# Patient Record
Sex: FEMALE | Race: WHITE | NOT HISPANIC OR LATINO | Employment: OTHER | URBAN - METROPOLITAN AREA
[De-identification: names, ages, dates, MRNs, and addresses within clinical notes are randomized per-mention and may not be internally consistent; named-entity substitution may affect disease eponyms.]

---

## 2017-08-02 ENCOUNTER — LAB SERVICES (OUTPATIENT)
Dept: LAB | Age: 57
End: 2017-08-02

## 2017-08-02 ENCOUNTER — OFFICE VISIT (OUTPATIENT)
Dept: FAMILY MEDICINE | Age: 57
End: 2017-08-02

## 2017-08-02 VITALS
DIASTOLIC BLOOD PRESSURE: 60 MMHG | HEIGHT: 67 IN | SYSTOLIC BLOOD PRESSURE: 112 MMHG | WEIGHT: 177 LBS | HEART RATE: 80 BPM | BODY MASS INDEX: 27.78 KG/M2

## 2017-08-02 DIAGNOSIS — N95.0 POST-MENOPAUSAL BLEEDING: ICD-10-CM

## 2017-08-02 DIAGNOSIS — N83.8 OVARIAN MASS: ICD-10-CM

## 2017-08-02 DIAGNOSIS — Z01.419 ENCOUNTER FOR GYNECOLOGICAL EXAMINATION WITHOUT ABNORMAL FINDING: ICD-10-CM

## 2017-08-02 DIAGNOSIS — Z13.220 SCREENING FOR LIPOID DISORDERS: ICD-10-CM

## 2017-08-02 DIAGNOSIS — Z12.4 PAP SMEAR FOR CERVICAL CANCER SCREENING: ICD-10-CM

## 2017-08-02 DIAGNOSIS — Z00.00 ANNUAL PHYSICAL EXAM: Primary | ICD-10-CM

## 2017-08-02 DIAGNOSIS — Z12.39 BREAST CANCER SCREENING: ICD-10-CM

## 2017-08-02 DIAGNOSIS — R22.32 LOCALIZED SWELLING, MASS, OR LUMP OF UPPER EXTREMITY, LEFT: ICD-10-CM

## 2017-08-02 PROCEDURE — 36415 COLL VENOUS BLD VENIPUNCTURE: CPT | Performed by: INTERNAL MEDICINE

## 2017-08-02 PROCEDURE — 87624 HPV HI-RISK TYP POOLED RSLT: CPT | Performed by: INTERNAL MEDICINE

## 2017-08-02 PROCEDURE — 80061 LIPID PANEL: CPT | Performed by: INTERNAL MEDICINE

## 2017-08-02 PROCEDURE — 88175 CYTOPATH C/V AUTO FLUID REDO: CPT | Performed by: INTERNAL MEDICINE

## 2017-08-02 PROCEDURE — 99396 PREV VISIT EST AGE 40-64: CPT | Performed by: FAMILY MEDICINE

## 2017-08-02 PROCEDURE — 84443 ASSAY THYROID STIM HORMONE: CPT | Performed by: INTERNAL MEDICINE

## 2017-08-03 ENCOUNTER — TELEPHONE (OUTPATIENT)
Dept: FAMILY MEDICINE | Age: 57
End: 2017-08-03

## 2017-08-03 LAB
CHOLEST SERPL-MCNC: 223 MG/DL
CHOLEST/HDLC SERPL: 3.7 {RATIO}
HDLC SERPL-MCNC: 61 MG/DL
LDLC SERPL-MCNC: 141 MG/DL
LENGTH OF FAST TIME PATIENT: 16 HRS
NONHDLC SERPL-MCNC: 162 MG/DL
TRIGL SERPL-MCNC: 105 MG/DL
TSH SERPL-ACNC: 0.88 MCUNITS/ML (ref 0.35–5)

## 2017-08-08 LAB — HPV16+18+45 E6+E7MRNA CVX NAA+PROBE: NORMAL

## 2017-08-09 ENCOUNTER — TELEPHONE (OUTPATIENT)
Dept: FAMILY MEDICINE | Age: 57
End: 2017-08-09

## 2017-08-09 DIAGNOSIS — R22.9 LOCALIZED SUPERFICIAL SWELLING, MASS, OR LUMP: Primary | ICD-10-CM

## 2017-08-18 ENCOUNTER — TELEPHONE (OUTPATIENT)
Dept: FAMILY MEDICINE | Age: 57
End: 2017-08-18

## 2017-08-18 ENCOUNTER — IMAGING SERVICES (OUTPATIENT)
Dept: GENERAL RADIOLOGY | Age: 57
End: 2017-08-18
Attending: FAMILY MEDICINE

## 2017-08-18 DIAGNOSIS — Z12.39 BREAST CANCER SCREENING: ICD-10-CM

## 2017-08-18 DIAGNOSIS — R22.32 LOCALIZED SWELLING, MASS, OR LUMP OF UPPER EXTREMITY, LEFT: ICD-10-CM

## 2017-08-18 DIAGNOSIS — N83.8 OVARIAN MASS: ICD-10-CM

## 2017-08-18 DIAGNOSIS — D17.9 ATYPICAL LIPOMA OF SOFT TISSUE: Primary | ICD-10-CM

## 2017-08-18 DIAGNOSIS — Z01.812 PRE-PROCEDURE LAB EXAM: Primary | ICD-10-CM

## 2017-08-18 DIAGNOSIS — R22.9 LOCALIZED SUPERFICIAL SWELLING, MASS, OR LUMP: ICD-10-CM

## 2017-08-18 PROCEDURE — G0202 SCR MAMMO BI INCL CAD: HCPCS | Performed by: RADIOLOGY

## 2017-08-18 PROCEDURE — 76882 US LMTD JT/FCL EVL NVASC XTR: CPT | Performed by: RADIOLOGY

## 2017-08-18 PROCEDURE — 77063 BREAST TOMOSYNTHESIS BI: CPT | Performed by: RADIOLOGY

## 2017-08-18 PROCEDURE — 76856 US EXAM PELVIC COMPLETE: CPT | Performed by: RADIOLOGY

## 2017-08-18 PROCEDURE — 76830 TRANSVAGINAL US NON-OB: CPT | Performed by: RADIOLOGY

## 2017-08-21 ENCOUNTER — LAB SERVICES (OUTPATIENT)
Dept: LAB | Age: 57
End: 2017-08-21

## 2017-08-21 DIAGNOSIS — Z01.812 PRE-PROCEDURE LAB EXAM: ICD-10-CM

## 2017-08-21 LAB — CREAT SERPL-MCNC: 0.91 MG/DL (ref 0.51–0.95)

## 2017-08-21 PROCEDURE — 36415 COLL VENOUS BLD VENIPUNCTURE: CPT | Performed by: INTERNAL MEDICINE

## 2017-08-21 PROCEDURE — 82565 ASSAY OF CREATININE: CPT | Performed by: INTERNAL MEDICINE

## 2017-08-29 ENCOUNTER — IMAGING SERVICES (OUTPATIENT)
Dept: GENERAL RADIOLOGY | Age: 57
End: 2017-08-29
Attending: FAMILY MEDICINE

## 2017-08-29 DIAGNOSIS — R22.9 LOCALIZED SUPERFICIAL SWELLING, MASS, OR LUMP: ICD-10-CM

## 2017-08-29 PROCEDURE — A9585 GADOBUTROL INJECTION: HCPCS | Performed by: RADIOLOGY

## 2017-08-29 PROCEDURE — 73220 MRI UPPR EXTREMITY W/O&W/DYE: CPT | Performed by: RADIOLOGY

## 2017-08-29 RX ORDER — GADOBUTROL 604.72 MG/ML
8 INJECTION INTRAVENOUS ONCE
Status: COMPLETED | OUTPATIENT
Start: 2017-08-29 | End: 2017-08-29

## 2017-08-29 RX ADMIN — GADOBUTROL 8 ML: 604.72 INJECTION INTRAVENOUS at 09:10

## 2017-08-31 ENCOUNTER — TELEPHONE (OUTPATIENT)
Dept: FAMILY MEDICINE | Age: 57
End: 2017-08-31

## 2017-08-31 PROBLEM — D17.9 ATYPICAL LIPOMA OF SOFT TISSUE: Status: ACTIVE | Noted: 2017-08-31

## 2017-09-14 ENCOUNTER — OFFICE VISIT (OUTPATIENT)
Dept: ORTHOPEDICS | Age: 57
End: 2017-09-14

## 2017-09-14 VITALS — TEMPERATURE: 98.3 F | WEIGHT: 185 LBS | HEIGHT: 66 IN | BODY MASS INDEX: 29.73 KG/M2

## 2017-09-14 DIAGNOSIS — M79.89 SOFT TISSUE MASS: Primary | ICD-10-CM

## 2017-09-14 PROCEDURE — 99243 OFF/OP CNSLTJ NEW/EST LOW 30: CPT | Performed by: NURSE PRACTITIONER

## 2017-09-19 ENCOUNTER — PREP FOR CASE (OUTPATIENT)
Dept: ORTHOPEDICS | Age: 57
End: 2017-09-19

## 2017-09-19 DIAGNOSIS — M25.812 MASS OF JOINT OF LEFT SHOULDER: Primary | ICD-10-CM

## 2017-09-27 RX ORDER — 0.9 % SODIUM CHLORIDE 0.9 %
2 VIAL (ML) INJECTION EVERY 12 HOURS SCHEDULED
Status: CANCELLED | OUTPATIENT
Start: 2017-09-27

## 2017-09-27 RX ORDER — 0.9 % SODIUM CHLORIDE 0.9 %
2 VIAL (ML) INJECTION PRN
Status: CANCELLED | OUTPATIENT
Start: 2017-09-27

## 2017-10-13 ENCOUNTER — NURSE ONLY (OUTPATIENT)
Dept: FAMILY MEDICINE | Age: 57
End: 2017-10-13

## 2017-10-13 DIAGNOSIS — Z23 NEED FOR VACCINATION: Primary | ICD-10-CM

## 2017-10-13 PROCEDURE — 90688 IIV4 VACCINE SPLT 0.5 ML IM: CPT | Performed by: FAMILY MEDICINE

## 2017-10-13 PROCEDURE — 90471 IMMUNIZATION ADMIN: CPT | Performed by: FAMILY MEDICINE

## 2017-11-08 ENCOUNTER — TELEPHONE (OUTPATIENT)
Dept: FAMILY MEDICINE | Age: 57
End: 2017-11-08

## 2017-11-09 ENCOUNTER — OFFICE VISIT (OUTPATIENT)
Dept: FAMILY MEDICINE | Age: 57
End: 2017-11-09

## 2017-11-09 VITALS
WEIGHT: 187 LBS | HEART RATE: 80 BPM | SYSTOLIC BLOOD PRESSURE: 140 MMHG | DIASTOLIC BLOOD PRESSURE: 80 MMHG | BODY MASS INDEX: 30.18 KG/M2

## 2017-11-09 DIAGNOSIS — F32.9 REACTIVE DEPRESSION: Primary | ICD-10-CM

## 2017-11-09 DIAGNOSIS — M76.899 ENTHESOPATHY OF KNEE: ICD-10-CM

## 2017-11-09 PROCEDURE — 99214 OFFICE O/P EST MOD 30 MIN: CPT | Performed by: FAMILY MEDICINE

## 2017-11-09 RX ORDER — MELOXICAM 15 MG/1
15 TABLET ORAL DAILY
Qty: 30 TABLET | Refills: 1 | Status: SHIPPED | OUTPATIENT
Start: 2017-11-09

## 2017-11-09 RX ORDER — ESCITALOPRAM OXALATE 10 MG/1
10 TABLET ORAL DAILY
Qty: 30 TABLET | Refills: 1 | Status: SHIPPED | OUTPATIENT
Start: 2017-11-09

## 2017-12-06 ENCOUNTER — OFFICE VISIT (OUTPATIENT)
Dept: FAMILY MEDICINE | Age: 57
End: 2017-12-06

## 2017-12-06 ENCOUNTER — IMAGING SERVICES (OUTPATIENT)
Dept: GENERAL RADIOLOGY | Age: 57
End: 2017-12-06

## 2017-12-06 ENCOUNTER — TELEPHONE (OUTPATIENT)
Dept: FAMILY MEDICINE | Age: 57
End: 2017-12-06

## 2017-12-06 ENCOUNTER — LAB SERVICES (OUTPATIENT)
Dept: LAB | Age: 57
End: 2017-12-06

## 2017-12-06 ENCOUNTER — OFFICE VISIT (OUTPATIENT)
Dept: CARDIOLOGY | Age: 57
End: 2017-12-06
Attending: FAMILY MEDICINE

## 2017-12-06 VITALS
DIASTOLIC BLOOD PRESSURE: 74 MMHG | HEART RATE: 72 BPM | OXYGEN SATURATION: 95 % | BODY MASS INDEX: 31.02 KG/M2 | SYSTOLIC BLOOD PRESSURE: 116 MMHG | WEIGHT: 192.2 LBS

## 2017-12-06 DIAGNOSIS — Z01.818 PREOP EXAMINATION: ICD-10-CM

## 2017-12-06 DIAGNOSIS — Z11.59 NEED FOR HEPATITIS C SCREENING TEST: ICD-10-CM

## 2017-12-06 DIAGNOSIS — Z01.818 PREOP EXAMINATION: Primary | ICD-10-CM

## 2017-12-06 DIAGNOSIS — D17.9 ATYPICAL LIPOMA OF SOFT TISSUE: ICD-10-CM

## 2017-12-06 LAB
ANION GAP SERPL CALC-SCNC: 17 MMOL/L (ref 10–20)
BASOPHILS # BLD AUTO: 0 K/MCL (ref 0–0.3)
BASOPHILS NFR BLD AUTO: 1 %
BUN SERPL-MCNC: 27 MG/DL (ref 6–20)
BUN/CREAT SERPL: 30 (ref 7–25)
CALCIUM SERPL-MCNC: 9.1 MG/DL (ref 8.4–10.2)
CHLORIDE SERPL-SCNC: 100 MMOL/L (ref 98–107)
CO2 SERPL-SCNC: 27 MMOL/L (ref 21–32)
CREAT SERPL-MCNC: 0.9 MG/DL (ref 0.51–0.95)
DIFFERENTIAL METHOD BLD: NORMAL
EOSINOPHIL # BLD AUTO: 0.1 K/MCL (ref 0.1–0.5)
EOSINOPHIL NFR SPEC: 1 %
ERYTHROCYTE [DISTWIDTH] IN BLOOD: 12.4 % (ref 11–15)
FASTING STATUS PATIENT QL REPORTED: 2 HRS
GLUCOSE SERPL-MCNC: 83 MG/DL (ref 65–99)
HCT VFR BLD CALC: 39.3 % (ref 36–46.5)
HGB BLD-MCNC: 13.6 G/DL (ref 12–15.5)
LYMPHOCYTES # BLD MANUAL: 2 K/MCL (ref 1–4)
LYMPHOCYTES NFR BLD MANUAL: 32 %
MCH RBC QN AUTO: 30 PG (ref 26–34)
MCHC RBC AUTO-ENTMCNC: 34.6 G/DL (ref 32–36.5)
MCV RBC AUTO: 86.6 FL (ref 78–100)
MONOCYTES # BLD MANUAL: 0.7 K/MCL (ref 0.3–0.9)
MONOCYTES NFR BLD MANUAL: 10 %
NEUTROPHILS # BLD: 3.6 K/MCL (ref 1.8–7.7)
NEUTROPHILS NFR BLD AUTO: 56 %
PLATELET # BLD: 267 K/MCL (ref 140–450)
POTASSIUM SERPL-SCNC: 4.4 MMOL/L (ref 3.4–5.1)
RBC # BLD: 4.54 MIL/MCL (ref 4–5.2)
SODIUM SERPL-SCNC: 140 MMOL/L (ref 135–145)
WBC # BLD: 6.3 K/MCL (ref 4.2–11)

## 2017-12-06 PROCEDURE — 93000 ELECTROCARDIOGRAM COMPLETE: CPT | Performed by: INTERNAL MEDICINE

## 2017-12-06 PROCEDURE — 85025 COMPLETE CBC W/AUTO DIFF WBC: CPT | Performed by: INTERNAL MEDICINE

## 2017-12-06 PROCEDURE — 99244 OFF/OP CNSLTJ NEW/EST MOD 40: CPT | Performed by: FAMILY MEDICINE

## 2017-12-06 PROCEDURE — 71020 XR CHEST PA AND LATERAL: CPT | Performed by: RADIOLOGY

## 2017-12-06 PROCEDURE — 86803 HEPATITIS C AB TEST: CPT | Performed by: INTERNAL MEDICINE

## 2017-12-06 PROCEDURE — 36415 COLL VENOUS BLD VENIPUNCTURE: CPT | Performed by: INTERNAL MEDICINE

## 2017-12-06 PROCEDURE — 80048 BASIC METABOLIC PNL TOTAL CA: CPT | Performed by: INTERNAL MEDICINE

## 2017-12-07 ENCOUNTER — TELEPHONE (OUTPATIENT)
Dept: FAMILY MEDICINE | Age: 57
End: 2017-12-07

## 2017-12-07 LAB — HCV AB SER QL: NEGATIVE

## 2017-12-18 ENCOUNTER — ANESTHESIA EVENT (OUTPATIENT)
Dept: SURGERY | Age: 57
End: 2017-12-18

## 2017-12-18 ASSESSMENT — ACTIVITIES OF DAILY LIVING (ADL)
HISTORY OF FALLING IN THE LAST YEAR (PRIOR TO ADMISSION): NO
ADL_SHORT_OF_BREATH: NO
NEEDS_ASSIST: NO
RECENT_DECLINE_ADL: NO
ADL_SCORE: 12
CHRONIC_PAIN_PRESENT: NO;YES, CHRONIC
ADL_BEFORE_ADMISSION: INDEPENDENT
DESCRIBE HOW PAIN IMPACTS YOUR LIFE: PHYSICAL ACTIVITY
SENSORY_SUPPORT_DEVICES: EYEGLASSES

## 2017-12-19 ENCOUNTER — HOSPITAL ENCOUNTER (OUTPATIENT)
Age: 57
Discharge: HOME | End: 2017-12-19
Attending: ORTHOPAEDIC SURGERY | Admitting: ORTHOPAEDIC SURGERY

## 2017-12-19 ENCOUNTER — SURGERY (OUTPATIENT)
Age: 57
End: 2017-12-19

## 2017-12-19 ENCOUNTER — ANESTHESIA (OUTPATIENT)
Dept: SURGERY | Age: 57
End: 2017-12-19

## 2017-12-19 DIAGNOSIS — M79.89 SOFT TISSUE MASS: ICD-10-CM

## 2017-12-19 PROCEDURE — 10003056 HB DISPOSABLE INSTRUMENT/SUPPLY 1: Performed by: ORTHOPAEDIC SURGERY

## 2017-12-19 PROCEDURE — 10002800 HB RX 250 W HCPCS: Performed by: NURSE PRACTITIONER

## 2017-12-19 PROCEDURE — 10002800 HB RX 250 W HCPCS: Performed by: ANESTHESIOLOGIST ASSISTANT

## 2017-12-19 PROCEDURE — 10002359 HB ANESTH GENERAL ADD'L 1/2 HR: Performed by: ORTHOPAEDIC SURGERY

## 2017-12-19 PROCEDURE — 10004451 HB PACU RECOVERY 1ST 30 MINUTES: Performed by: ORTHOPAEDIC SURGERY

## 2017-12-19 PROCEDURE — 10002801 HB RX 250 W/O HCPCS: Performed by: ANESTHESIOLOGIST ASSISTANT

## 2017-12-19 PROCEDURE — 23073 EXC SHOULDER TUM DEEP 5 CM/>: CPT | Performed by: ORTHOPAEDIC SURGERY

## 2017-12-19 PROCEDURE — 10004452 HB PACU ADDL 30 MINUTES: Performed by: ORTHOPAEDIC SURGERY

## 2017-12-19 PROCEDURE — 10002807 HB RX 258: Performed by: ANESTHESIOLOGIST ASSISTANT

## 2017-12-19 PROCEDURE — 10002803 HB RX 637: Performed by: ANESTHESIOLOGY

## 2017-12-19 PROCEDURE — 88304 TISSUE EXAM BY PATHOLOGIST: CPT

## 2017-12-19 PROCEDURE — 10002767 HB EXTENDED RECOVERY PER HOUR: Performed by: ORTHOPAEDIC SURGERY

## 2017-12-19 PROCEDURE — 10002801 HB RX 250 W/O HCPCS: Performed by: ORTHOPAEDIC SURGERY

## 2017-12-19 PROCEDURE — 10004316 HB COUNTER-PREP

## 2017-12-19 PROCEDURE — 10002358 HB ANESTH GENERAL 1ST 1/2 HR: Performed by: ORTHOPAEDIC SURGERY

## 2017-12-19 PROCEDURE — 10002117 HB ADDITIONAL SURGERY TIME/30 MIN: Performed by: ORTHOPAEDIC SURGERY

## 2017-12-19 PROCEDURE — 10002807 HB RX 258: Performed by: ANESTHESIOLOGY

## 2017-12-19 PROCEDURE — 10002344 HB UPPER/LOWER ARM/WRIST/HAND 1: Performed by: ORTHOPAEDIC SURGERY

## 2017-12-19 RX ORDER — PROPOFOL 10 MG/ML
INJECTION, EMULSION INTRAVENOUS PRN
Status: DISCONTINUED | OUTPATIENT
Start: 2017-12-19 | End: 2017-12-19

## 2017-12-19 RX ORDER — ONDANSETRON 4 MG/1
4 TABLET, ORALLY DISINTEGRATING ORAL
Status: COMPLETED | OUTPATIENT
Start: 2017-12-19 | End: 2017-12-19

## 2017-12-19 RX ORDER — LIDOCAINE AND PRILOCAINE 25; 25 MG/G; MG/G
1 CREAM TOPICAL PRN
Status: DISCONTINUED | OUTPATIENT
Start: 2017-12-19 | End: 2017-12-19 | Stop reason: HOSPADM

## 2017-12-19 RX ORDER — LIDOCAINE HYDROCHLORIDE 10 MG/ML
5-10 INJECTION, SOLUTION INFILTRATION; PERINEURAL PRN
Status: DISCONTINUED | OUTPATIENT
Start: 2017-12-19 | End: 2017-12-19 | Stop reason: HOSPADM

## 2017-12-19 RX ORDER — DEXAMETHASONE SODIUM PHOSPHATE 4 MG/ML
INJECTION, SOLUTION INTRA-ARTICULAR; INTRALESIONAL; INTRAMUSCULAR; INTRAVENOUS; SOFT TISSUE PRN
Status: DISCONTINUED | OUTPATIENT
Start: 2017-12-19 | End: 2017-12-19

## 2017-12-19 RX ORDER — SODIUM CHLORIDE, SODIUM LACTATE, POTASSIUM CHLORIDE, CALCIUM CHLORIDE 600; 310; 30; 20 MG/100ML; MG/100ML; MG/100ML; MG/100ML
INJECTION, SOLUTION INTRAVENOUS CONTINUOUS PRN
Status: DISCONTINUED | OUTPATIENT
Start: 2017-12-19 | End: 2017-12-19

## 2017-12-19 RX ORDER — HYDRALAZINE HYDROCHLORIDE 20 MG/ML
5 INJECTION INTRAMUSCULAR; INTRAVENOUS EVERY 10 MIN PRN
Status: DISCONTINUED | OUTPATIENT
Start: 2017-12-19 | End: 2017-12-19 | Stop reason: HOSPADM

## 2017-12-19 RX ORDER — DEXTROSE MONOHYDRATE 25 G/50ML
25 INJECTION, SOLUTION INTRAVENOUS PRN
Status: DISCONTINUED | OUTPATIENT
Start: 2017-12-19 | End: 2017-12-19 | Stop reason: HOSPADM

## 2017-12-19 RX ORDER — HUMAN INSULIN 100 [IU]/ML
INJECTION, SOLUTION SUBCUTANEOUS
Status: DISCONTINUED | OUTPATIENT
Start: 2017-12-19 | End: 2017-12-19 | Stop reason: HOSPADM

## 2017-12-19 RX ORDER — LIDOCAINE HYDROCHLORIDE 20 MG/ML
INJECTION, SOLUTION INFILTRATION; PERINEURAL PRN
Status: DISCONTINUED | OUTPATIENT
Start: 2017-12-19 | End: 2017-12-19

## 2017-12-19 RX ORDER — 0.9 % SODIUM CHLORIDE 0.9 %
2 VIAL (ML) INJECTION EVERY 12 HOURS SCHEDULED
Status: DISCONTINUED | OUTPATIENT
Start: 2017-12-19 | End: 2017-12-19 | Stop reason: HOSPADM

## 2017-12-19 RX ORDER — CEFAZOLIN SODIUM/WATER 2 G/20 ML
2000 SYRINGE (ML) INTRAVENOUS
Status: COMPLETED | OUTPATIENT
Start: 2017-12-19 | End: 2017-12-19

## 2017-12-19 RX ORDER — ONDANSETRON 2 MG/ML
4 INJECTION INTRAMUSCULAR; INTRAVENOUS 2 TIMES DAILY PRN
Status: CANCELLED | OUTPATIENT
Start: 2017-12-19

## 2017-12-19 RX ORDER — DIPHENHYDRAMINE HYDROCHLORIDE 50 MG/ML
25 INJECTION INTRAMUSCULAR; INTRAVENOUS
Status: DISCONTINUED | OUTPATIENT
Start: 2017-12-19 | End: 2017-12-19 | Stop reason: HOSPADM

## 2017-12-19 RX ORDER — SODIUM CHLORIDE, SODIUM LACTATE, POTASSIUM CHLORIDE, CALCIUM CHLORIDE 600; 310; 30; 20 MG/100ML; MG/100ML; MG/100ML; MG/100ML
INJECTION, SOLUTION INTRAVENOUS CONTINUOUS
Status: DISCONTINUED | OUTPATIENT
Start: 2017-12-19 | End: 2017-12-19 | Stop reason: HOSPADM

## 2017-12-19 RX ORDER — SODIUM CHLORIDE, SODIUM LACTATE, POTASSIUM CHLORIDE, CALCIUM CHLORIDE 600; 310; 30; 20 MG/100ML; MG/100ML; MG/100ML; MG/100ML
INJECTION, SOLUTION INTRAVENOUS CONTINUOUS
Status: CANCELLED | OUTPATIENT
Start: 2017-12-19

## 2017-12-19 RX ORDER — LABETALOL HYDROCHLORIDE 5 MG/ML
5 INJECTION, SOLUTION INTRAVENOUS EVERY 10 MIN PRN
Status: DISCONTINUED | OUTPATIENT
Start: 2017-12-19 | End: 2017-12-19 | Stop reason: HOSPADM

## 2017-12-19 RX ORDER — MIDAZOLAM HYDROCHLORIDE 1 MG/ML
INJECTION, SOLUTION INTRAMUSCULAR; INTRAVENOUS PRN
Status: DISCONTINUED | OUTPATIENT
Start: 2017-12-19 | End: 2017-12-19

## 2017-12-19 RX ORDER — ACETAMINOPHEN 325 MG/1
650 TABLET ORAL EVERY 4 HOURS PRN
Status: CANCELLED | OUTPATIENT
Start: 2017-12-19

## 2017-12-19 RX ORDER — NICOTINE POLACRILEX 4 MG
30 LOZENGE BUCCAL
Status: DISCONTINUED | OUTPATIENT
Start: 2017-12-19 | End: 2017-12-19 | Stop reason: HOSPADM

## 2017-12-19 RX ORDER — BUPIVACAINE HYDROCHLORIDE AND EPINEPHRINE 2.5; 5 MG/ML; UG/ML
INJECTION, SOLUTION EPIDURAL; INFILTRATION; INTRACAUDAL; PERINEURAL PRN
Status: DISCONTINUED | OUTPATIENT
Start: 2017-12-19 | End: 2017-12-19 | Stop reason: HOSPADM

## 2017-12-19 RX ORDER — SODIUM CHLORIDE 9 MG/ML
INJECTION, SOLUTION INTRAVENOUS CONTINUOUS
Status: DISCONTINUED | OUTPATIENT
Start: 2017-12-19 | End: 2017-12-19 | Stop reason: HOSPADM

## 2017-12-19 RX ORDER — HYDROCODONE BITARTRATE AND ACETAMINOPHEN 5; 325 MG/1; MG/1
1-2 TABLET ORAL EVERY 4 HOURS PRN
Status: CANCELLED | OUTPATIENT
Start: 2017-12-19

## 2017-12-19 RX ORDER — 0.9 % SODIUM CHLORIDE 0.9 %
2 VIAL (ML) INJECTION PRN
Status: DISCONTINUED | OUTPATIENT
Start: 2017-12-19 | End: 2017-12-19 | Stop reason: HOSPADM

## 2017-12-19 RX ORDER — ALBUTEROL SULFATE 2.5 MG/3ML
2.5 SOLUTION RESPIRATORY (INHALATION)
Status: DISCONTINUED | OUTPATIENT
Start: 2017-12-19 | End: 2017-12-19 | Stop reason: HOSPADM

## 2017-12-19 RX ORDER — MIDAZOLAM HYDROCHLORIDE 1 MG/ML
2 INJECTION, SOLUTION INTRAMUSCULAR; INTRAVENOUS
Status: DISCONTINUED | OUTPATIENT
Start: 2017-12-19 | End: 2017-12-19 | Stop reason: HOSPADM

## 2017-12-19 RX ORDER — DIPHENHYDRAMINE HYDROCHLORIDE 50 MG/ML
12.5 INJECTION INTRAMUSCULAR; INTRAVENOUS EVERY 4 HOURS PRN
Status: DISCONTINUED | OUTPATIENT
Start: 2017-12-19 | End: 2017-12-19 | Stop reason: HOSPADM

## 2017-12-19 RX ORDER — PROCHLORPERAZINE MALEATE 5 MG/1
5 TABLET ORAL EVERY 4 HOURS PRN
Status: CANCELLED | OUTPATIENT
Start: 2017-12-19

## 2017-12-19 RX ORDER — ONDANSETRON 2 MG/ML
4 INJECTION INTRAMUSCULAR; INTRAVENOUS 2 TIMES DAILY PRN
Status: DISCONTINUED | OUTPATIENT
Start: 2017-12-19 | End: 2017-12-19 | Stop reason: HOSPADM

## 2017-12-19 RX ORDER — SODIUM CHLORIDE 9 MG/ML
INJECTION, SOLUTION INTRAVENOUS CONTINUOUS PRN
Status: DISCONTINUED | OUTPATIENT
Start: 2017-12-19 | End: 2017-12-19

## 2017-12-19 RX ADMIN — HYDROMORPHONE HYDROCHLORIDE 0.5 MG: 1 INJECTION, SOLUTION INTRAMUSCULAR; INTRAVENOUS; SUBCUTANEOUS at 07:20

## 2017-12-19 RX ADMIN — SODIUM CHLORIDE: 9 INJECTION, SOLUTION INTRAVENOUS at 07:34

## 2017-12-19 RX ADMIN — SODIUM CHLORIDE, POTASSIUM CHLORIDE, SODIUM LACTATE AND CALCIUM CHLORIDE: 600; 310; 30; 20 INJECTION, SOLUTION INTRAVENOUS at 07:03

## 2017-12-19 RX ADMIN — FENTANYL CITRATE 50 MCG: 50 INJECTION INTRAMUSCULAR; INTRAVENOUS at 07:06

## 2017-12-19 RX ADMIN — SODIUM CHLORIDE, POTASSIUM CHLORIDE, SODIUM LACTATE AND CALCIUM CHLORIDE: 600; 310; 30; 20 INJECTION, SOLUTION INTRAVENOUS at 07:34

## 2017-12-19 RX ADMIN — SODIUM CHLORIDE: 900 IRRIGANT IRRIGATION at 07:34

## 2017-12-19 RX ADMIN — MIDAZOLAM HYDROCHLORIDE 2 MG: 1 INJECTION, SOLUTION INTRAMUSCULAR; INTRAVENOUS at 07:01

## 2017-12-19 RX ADMIN — DEXAMETHASONE SODIUM PHOSPHATE 4 MG: 4 INJECTION, SOLUTION INTRAMUSCULAR; INTRAVENOUS at 07:15

## 2017-12-19 RX ADMIN — BUPIVACAINE HYDROCHLORIDE AND EPINEPHRINE BITARTRATE 15 ML: 2.5; .005 INJECTION, SOLUTION EPIDURAL; INFILTRATION; INTRACAUDAL; PERINEURAL at 07:34

## 2017-12-19 RX ADMIN — PROPOFOL 130 MG: 10 INJECTION, EMULSION INTRAVENOUS at 07:09

## 2017-12-19 RX ADMIN — ONDANSETRON 4 MG: 4 TABLET, ORALLY DISINTEGRATING ORAL at 06:33

## 2017-12-19 RX ADMIN — KETOROLAC TROMETHAMINE 30 MG: 15 INJECTION, SOLUTION INTRAMUSCULAR; INTRAVENOUS at 07:40

## 2017-12-19 RX ADMIN — LIDOCAINE HYDROCHLORIDE 100 MG: 20 INJECTION, SOLUTION INFILTRATION; PERINEURAL at 07:09

## 2017-12-19 RX ADMIN — Medication 2000 MG: at 07:11

## 2017-12-19 RX ADMIN — SODIUM CHLORIDE, POTASSIUM CHLORIDE, SODIUM LACTATE AND CALCIUM CHLORIDE: 600; 310; 30; 20 INJECTION, SOLUTION INTRAVENOUS at 06:33

## 2017-12-19 ASSESSMENT — ACTIVITIES OF DAILY LIVING (ADL): HISTORY OF FALLING IN THE LAST YEAR (PRIOR TO ADMISSION): NO

## 2017-12-19 ASSESSMENT — PAIN SCALES - GENERAL
PAIN_LEVEL_AT_REST: 0
PAIN_LEVEL_AT_REST: 0

## 2017-12-20 LAB — PATHOLOGIST NAME: NORMAL

## 2017-12-27 ENCOUNTER — TELEPHONE (OUTPATIENT)
Dept: ORTHOPEDICS | Age: 57
End: 2017-12-27

## 2018-08-02 ENCOUNTER — TELEPHONE (OUTPATIENT)
Dept: FAMILY MEDICINE | Age: 58
End: 2018-08-02

## 2018-08-29 ENCOUNTER — OFFICE VISIT - RIVER FALLS (OUTPATIENT)
Dept: FAMILY MEDICINE | Facility: CLINIC | Age: 58
End: 2018-08-29

## 2018-08-29 ASSESSMENT — MIFFLIN-ST. JEOR: SCORE: 1424.19

## 2018-08-30 ENCOUNTER — AMBULATORY - RIVER FALLS (OUTPATIENT)
Dept: FAMILY MEDICINE | Facility: CLINIC | Age: 58
End: 2018-08-30

## 2018-08-31 LAB
CHOLEST SERPL-MCNC: 273 MG/DL
CHOLEST/HDLC SERPL: 3.7 {RATIO}
CREAT SERPL-MCNC: 0.95 MG/DL (ref 0.5–1.05)
GLUCOSE BLD-MCNC: 109 MG/DL (ref 65–99)
HBA1C MFR BLD: 5 %
HDLC SERPL-MCNC: 73 MG/DL
LDLC SERPL CALC-MCNC: 184 MG/DL
NONHDLC SERPL-MCNC: 200 MG/DL
TRIGL SERPL-MCNC: 61 MG/DL

## 2018-11-21 ENCOUNTER — OFFICE VISIT - RIVER FALLS (OUTPATIENT)
Dept: FAMILY MEDICINE | Facility: CLINIC | Age: 58
End: 2018-11-21

## 2018-11-21 ASSESSMENT — MIFFLIN-ST. JEOR: SCORE: 1375.1

## 2018-11-28 ENCOUNTER — OFFICE VISIT - RIVER FALLS (OUTPATIENT)
Dept: FAMILY MEDICINE | Facility: CLINIC | Age: 58
End: 2018-11-28

## 2018-11-29 LAB
CREAT SERPL-MCNC: 1.03 MG/DL (ref 0.5–1.05)
GLUCOSE BLD-MCNC: 96 MG/DL (ref 65–99)

## 2019-01-02 ENCOUNTER — OFFICE VISIT - RIVER FALLS (OUTPATIENT)
Dept: FAMILY MEDICINE | Facility: CLINIC | Age: 59
End: 2019-01-02

## 2019-01-30 ENCOUNTER — OFFICE VISIT - RIVER FALLS (OUTPATIENT)
Dept: FAMILY MEDICINE | Facility: CLINIC | Age: 59
End: 2019-01-30

## 2019-05-08 ENCOUNTER — OFFICE VISIT - RIVER FALLS (OUTPATIENT)
Dept: FAMILY MEDICINE | Facility: CLINIC | Age: 59
End: 2019-05-08

## 2019-09-04 ENCOUNTER — OFFICE VISIT - RIVER FALLS (OUTPATIENT)
Dept: FAMILY MEDICINE | Facility: CLINIC | Age: 59
End: 2019-09-04

## 2019-09-04 ASSESSMENT — MIFFLIN-ST. JEOR: SCORE: 1361.49

## 2019-09-05 ENCOUNTER — AMBULATORY - RIVER FALLS (OUTPATIENT)
Dept: FAMILY MEDICINE | Facility: CLINIC | Age: 59
End: 2019-09-05

## 2019-09-05 ENCOUNTER — COMMUNICATION - RIVER FALLS (OUTPATIENT)
Dept: FAMILY MEDICINE | Facility: CLINIC | Age: 59
End: 2019-09-05

## 2019-09-06 LAB
BUN SERPL-MCNC: 21 MG/DL (ref 7–25)
BUN/CREAT RATIO - HISTORICAL: 18 (ref 6–22)
CALCIUM SERPL-MCNC: 10.4 MG/DL (ref 8.6–10.4)
CHLORIDE BLD-SCNC: 101 MMOL/L (ref 98–110)
CHOLEST SERPL-MCNC: 295 MG/DL
CHOLEST/HDLC SERPL: 4 {RATIO}
CO2 SERPL-SCNC: 27 MMOL/L (ref 20–32)
CREAT SERPL-MCNC: 1.17 MG/DL (ref 0.5–1.05)
EGFRCR SERPLBLD CKD-EPI 2021: 51 ML/MIN/1.73M2
GLUCOSE BLD-MCNC: 83 MG/DL (ref 65–99)
HBA1C MFR BLD: 4.9 %
HDLC SERPL-MCNC: 73 MG/DL
LDLC SERPL CALC-MCNC: 201 MG/DL
NONHDLC SERPL-MCNC: 222 MG/DL
POTASSIUM BLD-SCNC: 4.5 MMOL/L (ref 3.5–5.3)
SODIUM SERPL-SCNC: 138 MMOL/L (ref 135–146)
TRIGL SERPL-MCNC: 89 MG/DL

## 2019-09-08 ENCOUNTER — COMMUNICATION - RIVER FALLS (OUTPATIENT)
Dept: FAMILY MEDICINE | Facility: CLINIC | Age: 59
End: 2019-09-08

## 2019-09-18 ENCOUNTER — COMMUNICATION - RIVER FALLS (OUTPATIENT)
Dept: FAMILY MEDICINE | Facility: CLINIC | Age: 59
End: 2019-09-18

## 2019-09-24 ENCOUNTER — COMMUNICATION - RIVER FALLS (OUTPATIENT)
Dept: FAMILY MEDICINE | Facility: CLINIC | Age: 59
End: 2019-09-24

## 2019-09-25 ENCOUNTER — OFFICE VISIT - RIVER FALLS (OUTPATIENT)
Dept: FAMILY MEDICINE | Facility: CLINIC | Age: 59
End: 2019-09-25

## 2019-10-02 ENCOUNTER — OFFICE VISIT - RIVER FALLS (OUTPATIENT)
Dept: FAMILY MEDICINE | Facility: CLINIC | Age: 59
End: 2019-10-02

## 2019-10-23 ENCOUNTER — AMBULATORY - RIVER FALLS (OUTPATIENT)
Dept: FAMILY MEDICINE | Facility: CLINIC | Age: 59
End: 2019-10-23

## 2019-10-28 LAB
A/G RATIO - HISTORICAL: 1.8 (ref 1–2.5)
ALBUMIN SERPL-MCNC: 4.5 GM/DL (ref 3.6–5.1)
ALBUMIN SERPL-MCNC: 4.6 GM/DL (ref 3.8–4.8)
ALP SERPL-CCNC: 78 UNIT/L (ref 33–130)
ALPHA-1-GLOBULIN - QUEST: 0.3 GM/DL (ref 0.2–0.3)
ALPHA-2-GLOBULIN - QUEST: 0.6 GM/DL (ref 0.5–0.9)
ALT SERPL W P-5'-P-CCNC: 14 UNIT/L (ref 6–29)
ANA PATTERN: ABNORMAL
ANA SER QL IA: POSITIVE
ANA TITR SER IF: ABNORMAL TITER
ASO AB SERPL-ACNC: 77 IU/ML
AST SERPL W P-5'-P-CCNC: 14 UNIT/L (ref 10–35)
BETA 1 GLOBULIN: 0.4 GM/DL (ref 0.4–0.6)
BETA2 GLOB SERPL ELPH-MCNC: 0.3 GM/DL (ref 0.2–0.5)
BILIRUB SERPL-MCNC: 0.8 MG/DL (ref 0.2–1.2)
BUN SERPL-MCNC: 19 MG/DL (ref 7–25)
BUN/CREAT RATIO - HISTORICAL: NORMAL (ref 6–22)
C REACTIVE PROTEIN LHE: 4 MG/L
C-ANCA TITR SER IF: NEGATIVE {TITER}
C3 SERPL-MCNC: 136 MG/DL (ref 83–193)
CALCIUM SERPL-MCNC: 9.8 MG/DL (ref 8.6–10.4)
CHLORIDE BLD-SCNC: 103 MMOL/L (ref 98–110)
CO2 SERPL-SCNC: 28 MMOL/L (ref 20–32)
CREAT SERPL-MCNC: 0.93 MG/DL (ref 0.5–1.05)
EGFRCR SERPLBLD CKD-EPI 2021: 68 ML/MIN/1.73M2
ERYTHROCYTE [DISTWIDTH] IN BLOOD BY AUTOMATED COUNT: 12.3 % (ref 11–15)
GAMMA GLOBULIN - QUEST: 0.9 GM/DL (ref 0.8–1.7)
GLOBULIN: 2.5 (ref 1.9–3.7)
GLUCOSE BLD-MCNC: 77 MG/DL (ref 65–139)
HBV CORE AB SERPL QL IA: NORMAL
HBV SURFACE AB SERPL IA-ACNC: 72 MIU/ML
HBV SURFACE AG SERPL QL IA: NORMAL
HCT VFR BLD AUTO: 38.9 % (ref 35–45)
HCV AB SERPL QL IA: NORMAL
HEP C SIGNAL TO CUTOFF(HISTORICAL): 0.02
HGB BLD-MCNC: 13.7 GM/DL (ref 11.7–15.5)
Lab: <95 UNIT/ML
MCH RBC QN AUTO: 30.2 PG (ref 27–33)
MCHC RBC AUTO-ENTMCNC: 35.2 GM/DL (ref 32–36)
MCV RBC AUTO: 85.7 FL (ref 80–100)
PLATELET # BLD AUTO: 285 10*3/UL (ref 140–400)
PMV BLD: 9.1 FL (ref 7.5–12.5)
POTASSIUM BLD-SCNC: 4.7 MMOL/L (ref 3.5–5.3)
PROT SERPL-MCNC: 7 GM/DL (ref 6.1–8.1)
PROT SERPL-MCNC: 7 GM/DL (ref 6.1–8.1)
RBC # BLD AUTO: 4.54 10*6/UL (ref 3.8–5.1)
RHEUMATOID FACT SER NEPH-ACNC: <14 IU/ML
SODIUM SERPL-SCNC: 139 MMOL/L (ref 135–146)
WBC # BLD AUTO: 5.4 10*3/UL (ref 3.8–10.8)

## 2019-11-13 LAB
BUN SERPL-MCNC: 21 MG/DL
CALCIUM SERPL-MCNC: 9.8 MEQ/DL
CHLORIDE BLD-SCNC: 101 MEQ/L
CO2 SERPL-SCNC: 27 MEQ/L
CREAT SERPL-MCNC: 1.1 MG/DL
GLUCOSE BLD-MCNC: 85 MG/DL
POTASSIUM BLD-SCNC: 4.3 MEQ/L
SODIUM SERPL-SCNC: 136 MEQ/L

## 2020-06-18 ENCOUNTER — RECORDS - HEALTHEAST (OUTPATIENT)
Dept: ADMINISTRATIVE | Facility: OTHER | Age: 60
End: 2020-06-18

## 2020-06-18 ENCOUNTER — OFFICE VISIT - RIVER FALLS (OUTPATIENT)
Dept: FAMILY MEDICINE | Facility: CLINIC | Age: 60
End: 2020-06-18

## 2020-06-25 ENCOUNTER — RECORDS - HEALTHEAST (OUTPATIENT)
Dept: ADMINISTRATIVE | Facility: OTHER | Age: 60
End: 2020-06-25

## 2020-06-25 ENCOUNTER — OFFICE VISIT - RIVER FALLS (OUTPATIENT)
Dept: FAMILY MEDICINE | Facility: CLINIC | Age: 60
End: 2020-06-25

## 2020-07-08 ENCOUNTER — RECORDS - HEALTHEAST (OUTPATIENT)
Dept: ADMINISTRATIVE | Facility: OTHER | Age: 60
End: 2020-07-08

## 2020-07-30 ENCOUNTER — RECORDS - HEALTHEAST (OUTPATIENT)
Dept: ADMINISTRATIVE | Facility: OTHER | Age: 60
End: 2020-07-30

## 2020-07-30 ENCOUNTER — OFFICE VISIT - RIVER FALLS (OUTPATIENT)
Dept: FAMILY MEDICINE | Facility: CLINIC | Age: 60
End: 2020-07-30

## 2020-07-30 LAB
CREAT SERPL-MCNC: 0.94 MG/DL (ref 0.5–1.05)
GFR ESTIMATE EXT - HISTORICAL: 66 ML/MIN/1.73M2
GFR ESTIMATE, IF BLACK EXT - HISTORICAL: 77 ML/MIN/1.73M2

## 2020-08-15 ENCOUNTER — COMMUNICATION - RIVER FALLS (OUTPATIENT)
Dept: FAMILY MEDICINE | Facility: CLINIC | Age: 60
End: 2020-08-15

## 2020-08-20 ENCOUNTER — OFFICE VISIT - RIVER FALLS (OUTPATIENT)
Dept: FAMILY MEDICINE | Facility: CLINIC | Age: 60
End: 2020-08-20

## 2020-08-20 ENCOUNTER — RECORDS - HEALTHEAST (OUTPATIENT)
Dept: ADMINISTRATIVE | Facility: OTHER | Age: 60
End: 2020-08-20

## 2020-08-24 ENCOUNTER — RECORDS - HEALTHEAST (OUTPATIENT)
Dept: ADMINISTRATIVE | Facility: OTHER | Age: 60
End: 2020-08-24

## 2020-08-26 ENCOUNTER — COMMUNICATION - HEALTHEAST (OUTPATIENT)
Dept: ADMINISTRATIVE | Facility: CLINIC | Age: 60
End: 2020-08-26

## 2020-08-26 ENCOUNTER — AMBULATORY - HEALTHEAST (OUTPATIENT)
Dept: SURGERY | Facility: CLINIC | Age: 60
End: 2020-08-26

## 2020-08-26 DIAGNOSIS — Z11.59 ENCOUNTER FOR SCREENING FOR OTHER VIRAL DISEASES: ICD-10-CM

## 2020-09-01 ENCOUNTER — RECORDS - HEALTHEAST (OUTPATIENT)
Dept: HEALTH INFORMATION MANAGEMENT | Facility: CLINIC | Age: 60
End: 2020-09-01

## 2020-09-02 ENCOUNTER — AMBULATORY - HEALTHEAST (OUTPATIENT)
Dept: MULTI SPECIALTY CLINIC | Facility: CLINIC | Age: 60
End: 2020-09-02

## 2020-09-02 ENCOUNTER — OFFICE VISIT - RIVER FALLS (OUTPATIENT)
Dept: FAMILY MEDICINE | Facility: CLINIC | Age: 60
End: 2020-09-02

## 2020-09-02 ENCOUNTER — COMMUNICATION - RIVER FALLS (OUTPATIENT)
Dept: FAMILY MEDICINE | Facility: CLINIC | Age: 60
End: 2020-09-02

## 2020-09-02 LAB
A/G RATIO - HISTORICAL: 1.6 (ref 1–2)
ALBUMIN SERPL-MCNC: 4.5 G/DL (ref 3.5–5.2)
ALP SERPL-CCNC: 92 IU/L (ref 50–136)
ALT SERPL W P-5'-P-CCNC: 21 IU/L (ref 8–45)
ALT SERPL W/O P-5'-P-CCNC: 21 IU/L (ref 8–45)
ANION GAP SERPL CALCULATED.3IONS-SCNC: 8 MMOL/L (ref 5–18)
AST SERPL W P-5'-P-CCNC: 18 IU/L (ref 2–40)
AST SERPL-CCNC: 18 IU/L (ref 2–40)
BASOPHILS # BLD MANUAL: 0 10*3/UL
BASOPHILS NFR BLD MANUAL: 0.4 %
BILIRUB DIRECT SERPL-MCNC: 0.3 MG/DL (ref 0.1–0.5)
BILIRUB INDIRECT SERPL-MCNC: 0.6 MG/DL (ref 0.2–0.8)
BILIRUB SERPL-MCNC: 0.9 MG/DL (ref 0.2–1.2)
BUN SERPL-MCNC: 20 MG/DL (ref 8–25)
BUN/CREAT RATIO - HISTORICAL: 21 (ref 10–20)
CALCIUM SERPL-MCNC: 9.6 MG/DL (ref 8.5–10.5)
CHLORIDE BLD-SCNC: 103 MMOL/L (ref 98–110)
CO2 SERPL-SCNC: 30 MMOL/L (ref 21–31)
CREAT SERPL-MCNC: 0.94 MG/DL (ref 0.57–1.11)
CREAT SERPL-MCNC: 0.94 MG/DL (ref 0.57–1.11)
D DIMER PPP FEU-MCNC: 0.3
EOSINOPHIL # BLD MANUAL: 0.1 10*3/UL
EOSINOPHIL NFR BLD MANUAL: 1.3 %
ERYTHROCYTE [DISTWIDTH] IN BLOOD BY AUTOMATED COUNT: 12.6 % (ref 11.5–15.5)
ERYTHROCYTE [SEDIMENTATION RATE] IN BLOOD BY WESTERGREN METHOD: 22 MM/HR
GFR ESTIMATE EXT - HISTORICAL: >60
GFR ESTIMATE EXT - HISTORICAL: >60 ML/MIN/1.73M2
GFR ESTIMATE, IF BLACK EXT - HISTORICAL: >60 ML/MIN/1.73M2
GLOBULIN: 2.9 G/DL (ref 2–3.7)
GLUCOSE BLD-MCNC: 97 MG/DL (ref 65–100)
HCT VFR BLD AUTO: 40.1 % (ref 33–51)
HGB BLD-MCNC: 14.1 G/DL (ref 12–16)
LYMPHOCYTES # BLD MANUAL: 2.3 10*3/UL (ref 0.9–2.9)
LYMPHOCYTES NFR BLD MANUAL: 33 %
MCH RBC QN AUTO: 29.9 PG (ref 26–34)
MCHC RBC AUTO-ENTMCNC: 35.2 G/DL (ref 32–36)
MCV RBC AUTO: 85 FL (ref 80–100)
MONOCYTES # BLD MANUAL: 0.6 10*3/UL
MONOCYTES NFR BLD MANUAL: 8.6 %
NEUTROPHILS # BLD MANUAL: 3.9 10*3/UL (ref 1.7–7)
NEUTROPHILS NFR BLD MANUAL: 56.7 %
PLATELET # BLD AUTO: 265 10*3/UL (ref 140–440)
PMV BLD: 8.5 FL (ref 6.5–11)
POTASSIUM BLD-SCNC: 4.9 MMOL/L (ref 3.5–5)
PROT SERPL-MCNC: 7.4 G/DL (ref 6–8)
RBC # BLD AUTO: 4.72 10*6/UL (ref 4–5.2)
SODIUM SERPL-SCNC: 141 MMOL/L (ref 135–145)
WBC # BLD AUTO: 6.9 10*3/UL (ref 4.5–11)

## 2020-09-21 ENCOUNTER — COMMUNICATION - HEALTHEAST (OUTPATIENT)
Dept: TELEHEALTH | Facility: CLINIC | Age: 60
End: 2020-09-21

## 2020-09-21 ENCOUNTER — AMBULATORY - HEALTHEAST (OUTPATIENT)
Dept: LAB | Facility: CLINIC | Age: 60
End: 2020-09-21

## 2020-09-21 DIAGNOSIS — Z11.59 ENCOUNTER FOR SCREENING FOR OTHER VIRAL DISEASES: ICD-10-CM

## 2020-09-21 ASSESSMENT — MIFFLIN-ST. JEOR: SCORE: 1387.35

## 2020-09-22 ENCOUNTER — COMMUNICATION - HEALTHEAST (OUTPATIENT)
Dept: SCHEDULING | Facility: CLINIC | Age: 60
End: 2020-09-22

## 2020-09-22 ENCOUNTER — ANESTHESIA - HEALTHEAST (OUTPATIENT)
Dept: SURGERY | Facility: CLINIC | Age: 60
End: 2020-09-22

## 2020-09-23 ENCOUNTER — SURGERY - HEALTHEAST (OUTPATIENT)
Dept: SURGERY | Facility: CLINIC | Age: 60
End: 2020-09-23

## 2020-09-23 ASSESSMENT — MIFFLIN-ST. JEOR: SCORE: 1376.01

## 2020-10-05 LAB
SARS-COV-2 PCR COMMENT: ABNORMAL
SARS-COV-2 RNA SPEC QL NAA+PROBE: POSITIVE
SARS-COV-2 VIRUS SPECIMEN SOURCE: ABNORMAL

## 2020-11-30 ENCOUNTER — OFFICE VISIT - HEALTHEAST (OUTPATIENT)
Dept: RHEUMATOLOGY | Facility: CLINIC | Age: 60
End: 2020-11-30

## 2020-11-30 DIAGNOSIS — R76.8 SCL-70 ANTIBODY POSITIVE: ICD-10-CM

## 2020-11-30 DIAGNOSIS — R76.8 ANA POSITIVE: ICD-10-CM

## 2020-11-30 DIAGNOSIS — R20.2 PARESTHESIA OF BOTH HANDS: ICD-10-CM

## 2020-12-17 ENCOUNTER — COMMUNICATION - HEALTHEAST (OUTPATIENT)
Dept: ADMINISTRATIVE | Facility: CLINIC | Age: 60
End: 2020-12-17

## 2020-12-18 ENCOUNTER — AMBULATORY - RIVER FALLS (OUTPATIENT)
Dept: FAMILY MEDICINE | Facility: CLINIC | Age: 60
End: 2020-12-18

## 2020-12-18 ENCOUNTER — RECORDS - HEALTHEAST (OUTPATIENT)
Dept: ADMINISTRATIVE | Facility: OTHER | Age: 60
End: 2020-12-18

## 2020-12-18 LAB
BACTERIA #/AREA URNS HPF: NORMAL /[HPF]
EPITHELIAL CELLS UR: NORMAL
RBC #/AREA URNS AUTO: NORMAL /[HPF]
WBC #/AREA URNS AUTO: NORMAL /[HPF]

## 2020-12-20 LAB
BACTERIA SPEC CULT: NORMAL
C3 SERPL-MCNC: 130 MG/DL (ref 83–193)
C4 SERPL-MCNC: 20 MG/DL (ref 15–57)
CARDIOLIPIN IGA SER IA-ACNC: <11
CARDIOLIPIN IGG SER IA-ACNC: <14
CARDIOLIPIN IGM SER IA-ACNC: 20
CREAT UR-MCNC: 37 MG/DL (ref 20–275)
LUPUS ANTICOAGULANT - QUEST: NORMAL
Lab: 33
Lab: 33
MICROALBUMIN UR-MCNC: <0.2 MG/DL
MICROALBUMIN/CREAT UR: NORMAL MG/G{CREAT}
TSH SERPL DL<=0.005 MIU/L-ACNC: 1.41 MIU/L (ref 0.4–4.5)

## 2020-12-21 ENCOUNTER — COMMUNICATION - HEALTHEAST (OUTPATIENT)
Dept: RHEUMATOLOGY | Facility: CLINIC | Age: 60
End: 2020-12-21

## 2020-12-22 LAB
ALBUMIN UR-MCNC: NEGATIVE G/DL
BILIRUB UR QL STRIP: NEGATIVE
GLUCOSE UR STRIP-MCNC: NEGATIVE MG/DL
HGB UR QL STRIP: NEGATIVE
KETONES UR STRIP-MCNC: NEGATIVE MG/DL
LEUKOCYTE ESTERASE UR QL STRIP: ABNORMAL
NITRATE UR QL: NEGATIVE
PH UR STRIP: 5 [PH] (ref 5–8)
SP GR UR STRIP: ABNORMAL (ref 1–1.03)

## 2020-12-29 ENCOUNTER — COMMUNICATION - HEALTHEAST (OUTPATIENT)
Dept: ADMINISTRATIVE | Facility: CLINIC | Age: 60
End: 2020-12-29

## 2020-12-29 DIAGNOSIS — R76.8 SCL-70 ANTIBODY POSITIVE: ICD-10-CM

## 2020-12-29 DIAGNOSIS — R20.2 PARESTHESIA OF BOTH HANDS: ICD-10-CM

## 2020-12-29 DIAGNOSIS — R76.8 ANA POSITIVE: ICD-10-CM

## 2021-02-19 ENCOUNTER — OFFICE VISIT - RIVER FALLS (OUTPATIENT)
Dept: FAMILY MEDICINE | Facility: CLINIC | Age: 61
End: 2021-02-19

## 2021-02-19 ASSESSMENT — MIFFLIN-ST. JEOR: SCORE: 1402.64

## 2021-02-24 LAB — HPV HIGH RISK: NOT DETECTED

## 2021-02-25 ENCOUNTER — COMMUNICATION - RIVER FALLS (OUTPATIENT)
Dept: FAMILY MEDICINE | Facility: CLINIC | Age: 61
End: 2021-02-25

## 2021-03-02 ENCOUNTER — AMBULATORY - RIVER FALLS (OUTPATIENT)
Dept: FAMILY MEDICINE | Facility: CLINIC | Age: 61
End: 2021-03-02

## 2021-03-03 LAB — FECAL FAT, QUALITATIVE: NORMAL

## 2021-03-25 ENCOUNTER — COMMUNICATION - RIVER FALLS (OUTPATIENT)
Dept: FAMILY MEDICINE | Facility: CLINIC | Age: 61
End: 2021-03-25

## 2021-03-31 ENCOUNTER — RECORDS - HEALTHEAST (OUTPATIENT)
Dept: ADMINISTRATIVE | Facility: OTHER | Age: 61
End: 2021-03-31

## 2021-03-31 ENCOUNTER — AMBULATORY - RIVER FALLS (OUTPATIENT)
Dept: FAMILY MEDICINE | Facility: CLINIC | Age: 61
End: 2021-03-31

## 2021-04-01 ENCOUNTER — COMMUNICATION - RIVER FALLS (OUTPATIENT)
Dept: FAMILY MEDICINE | Facility: CLINIC | Age: 61
End: 2021-04-01

## 2021-04-01 LAB
BUN SERPL-MCNC: 18 MG/DL (ref 7–25)
BUN/CREAT RATIO - HISTORICAL: NORMAL (ref 6–22)
CALCIUM SERPL-MCNC: 9.8 MG/DL (ref 8.6–10.4)
CHLORIDE BLD-SCNC: 101 MMOL/L (ref 98–110)
CHOLEST SERPL-MCNC: 263 MG/DL
CHOLEST/HDLC SERPL: 4.8 {RATIO}
CO2 SERPL-SCNC: 27 MMOL/L (ref 20–32)
CREAT SERPL-MCNC: 0.98 MG/DL (ref 0.5–0.99)
EGFRCR SERPLBLD CKD-EPI 2021: 63 ML/MIN/1.73M2
GLUCOSE BLD-MCNC: 88 MG/DL (ref 65–99)
HBA1C MFR BLD: 4.7 %
HDLC SERPL-MCNC: 55 MG/DL
LDLC SERPL CALC-MCNC: 178 MG/DL
NONHDLC SERPL-MCNC: 208 MG/DL
POTASSIUM BLD-SCNC: 4.3 MMOL/L (ref 3.5–5.3)
SODIUM SERPL-SCNC: 135 MMOL/L (ref 135–146)
TRIGL SERPL-MCNC: 152 MG/DL

## 2021-04-02 ENCOUNTER — RECORDS - HEALTHEAST (OUTPATIENT)
Dept: ADMINISTRATIVE | Facility: OTHER | Age: 61
End: 2021-04-02

## 2021-04-02 LAB
CARDIOLIPIN IGA SER IA-ACNC: <11
CARDIOLIPIN IGG SER IA-ACNC: <14
CARDIOLIPIN IGM SER IA-ACNC: 15

## 2021-04-06 VITALS
RESPIRATION RATE: 16 BRPM | TEMPERATURE: 97.3 F | BODY MASS INDEX: 30.29 KG/M2 | DIASTOLIC BLOOD PRESSURE: 83 MMHG | HEIGHT: 66 IN | SYSTOLIC BLOOD PRESSURE: 115 MMHG | HEART RATE: 62 BPM | WEIGHT: 188.49 LBS | OXYGEN SATURATION: 97 %

## 2021-04-15 ENCOUNTER — COMMUNICATION - HEALTHEAST (OUTPATIENT)
Dept: ADMINISTRATIVE | Facility: CLINIC | Age: 61
End: 2021-04-15

## 2021-04-15 ENCOUNTER — COMMUNICATION - RIVER FALLS (OUTPATIENT)
Dept: FAMILY MEDICINE | Facility: CLINIC | Age: 61
End: 2021-04-15

## 2021-04-21 ENCOUNTER — AMBULATORY - RIVER FALLS (OUTPATIENT)
Dept: FAMILY MEDICINE | Facility: CLINIC | Age: 61
End: 2021-04-21

## 2021-05-19 ENCOUNTER — AMBULATORY - RIVER FALLS (OUTPATIENT)
Dept: FAMILY MEDICINE | Facility: CLINIC | Age: 61
End: 2021-05-19

## 2021-06-05 VITALS — HEIGHT: 65 IN | WEIGHT: 177.5 LBS | BODY MASS INDEX: 29.57 KG/M2

## 2021-06-10 NOTE — TELEPHONE ENCOUNTER
[facility] [phone]  Referring Provider: Kristine Valadez MD  DX: positive ROBERTO    Ref./rec. Were received on 8/24/2020 3:21:57 PM in the rheum consult folder.

## 2021-06-11 NOTE — ANESTHESIA PREPROCEDURE EVALUATION
Anesthesia Evaluation      Patient summary reviewed   History of anesthetic complications (PONV)     Airway   Mallampati: I  Neck ROM: full   Pulmonary - negative ROS and normal exam    breath sounds clear to auscultation                         Cardiovascular - negative ROS and normal exam  ECG reviewed  Rhythm: regular  Rate: normal,         Neuro/Psych - negative ROS     Endo/Other    (+) arthritis,      GI/Hepatic/Renal - negative ROS           Dental - normal exam                        Anesthesia Plan  Planned anesthetic: spinal    ASA 2     Anesthetic plan and risks discussed with: patient  Anesthesia plan special considerations: antiemetics,   Post-op plan: routine recovery

## 2021-06-11 NOTE — ANESTHESIA CARE TRANSFER NOTE
Last vitals:   Vitals:    09/23/20 1432   BP: 108/56   Pulse: 78   Resp: 16   Temp: 36.1  C (96.9  F)   SpO2: 100%     Patient's level of consciousness is drowsy  Spontaneous respirations: yes  Maintains airway independently: yes  Dentition unchanged: yes  Oropharynx: oropharynx clear of all foreign objects    QCDR Measures:  ASA# 20 - Surgical Safety Checklist: WHO surgical safety checklist completed prior to induction    PQRS# 430 - Adult PONV Prevention: 4558F - Pt received => 2 anti-emetic agents (different classes) preop & intraop  ASA# 8 - Peds PONV Prevention: NA - Not pediatric patient, not GA or 2 or more risk factors NOT present  PQRS# 424 - Desirae-op Temp Management: 4559F - At least one body temp DOCUMENTED => 35.5C or 95.9F within required timeframe  PQRS# 426 - PACU Transfer Protocol: - Transfer of care checklist used  ASA# 14 - Acute Post-op Pain: ASA14B - Patient did NOT experience pain >= 7 out of 10

## 2021-06-11 NOTE — ANESTHESIA PROCEDURE NOTES
Peripheral Block    Patient location during procedure: pre-op  Start time: 9/23/2020 12:40 PM  End time: 9/23/2020 12:43 PM  post-op analgesia per surgeon order as noted in medical record  Staffing:  Performing  Anesthesiologist: Benjamín Wright MD  Preanesthetic Checklist  Completed: patient identified, site marked, risks, benefits, and alternatives discussed, timeout performed, consent obtained, airway assessed, oxygen available, suction available, emergency drugs available and hand hygiene performed  Peripheral Block  Block type: saphenous, adductor canal block  Prep: ChloraPrep  Patient position: supine  Patient monitoring: blood pressure, heart rate, continuous pulse oximetry and cardiac monitor  Laterality: left  Injection technique: ultrasound guided    Ultrasound used to visualize needle placement in proximity to nerve being blocked: yes   US used to visualize anesthetic spread  Visualized anatomic structures normal  No Pathological Findings  Permanent ultrasound image captured for medical record  Sterile gel and probe cover used for ultrasound.  Needle  Needle type: Stimuplex   Needle gauge: 20G  Needle length: 4 in  no peripheral nerve catheter placed  Assessment  Injection assessment: incremental injection, no paresthesia on injection, negative aspiration for heme and no difficulty with injection

## 2021-06-11 NOTE — ANESTHESIA POSTPROCEDURE EVALUATION
Patient: Wanda Alva  Procedure(s):  LEFT KNEE REVISION POLYETHYLENE EXCHANGE AND PATELLAR  DEBRIDEMENT (Left)  Anesthesia type: spinal    Patient location: PACU  Last vitals:   Vitals Value Taken Time   /66 9/23/2020  3:10 PM   Temp 36.2  C (97.2  F) 9/23/2020  3:00 PM   Pulse 79 9/23/2020  3:10 PM   Resp 11 9/23/2020  3:10 PM   SpO2 98 % 9/23/2020  3:10 PM   Vitals shown include unvalidated device data.  Post vital signs: stable  Level of consciousness: awake and return to baseline  Post-anesthesia pain: pain controlled  Post-anesthesia nausea and vomiting: no  Pulmonary: unassisted, return to baseline  Cardiovascular: stable and blood pressure at baseline  Hydration: adequate  Anesthetic events: no, SAB resolving    QCDR Measures:  ASA# 11 - Desirae-op Cardiac Arrest: ASA11B - Patient did NOT experience unanticipated cardiac arrest  ASA# 12 - Desirae-op Mortality Rate: ASA12B - Patient did NOT die  ASA# 13 - PACU Re-Intubation Rate: NA - No ETT / LMA used for case  ASA# 10 - Composite Anes Safety: ASA10A - No serious adverse event    Additional Notes:

## 2021-06-11 NOTE — TELEPHONE ENCOUNTER
Date: 11/30/2020 Status: Alexandre   Time: 3:00 PM Length: 60   Visit Type: VIDEO VISIT NEW [8967] Copay: $0.00   Provider: Rubens Valera DO

## 2021-06-11 NOTE — ANESTHESIA PROCEDURE NOTES
Spinal Block    Patient location during procedure: OR  Start time: 9/23/2020 1:27 PM  End time: 9/23/2020 1:31 PM  Reason for block: primary anesthetic    Staffing:  Performing  Anesthesiologist: Benjamín Wright MD    Preanesthetic Checklist  Completed: patient identified, risks, benefits, and alternatives discussed, timeout performed, consent obtained, airway assessed, oxygen available, suction available, emergency drugs available and hand hygiene performed  Spinal Block  Patient position: sitting  Prep: ChloraPrep and site prepped and draped  Patient monitoring: heart rate, cardiac monitor, continuous pulse ox and blood pressure  Approach: midline  Location: L3-4  Injection technique: single-shot  Needle type: pencil-tip   Needle gauge: 24 G    Assessment  Sensory level: T6

## 2021-06-11 NOTE — TELEPHONE ENCOUNTER
"Coronavirus (COVID-19) Notification    Caller Name (Patient, parent, daughter/sone, grandparent, etc)  Patient     Reason for call  Notify of Positive Coronavirus (COVID-19) lab results, assess symptoms,  review [facility] recommendations    Lab Result    Lab test:  2019-nCoV rRt-PCR or SARS-CoV-2 PCR    Oropharyngeal AND/OR nasopharyngeal swabs is POSITIVE for 2019-nCoV RNA/SARS-COV-2 PCR (COVID-19 virus)    RN Recommendations/Instructions per [facility] Coronavirus COVID-19 recommendations    Brief introduction script  Introduce self and then review script:  \"I am calling on behalf of [employer].  We were notified that your Coronavirus test (COVID-19) for was POSITIVE for the virus.  I have some information to relay to you but first I wanted to mention that the [place] Dept of Health will be contacting you shortly [it's possible MD already called Patient] to talk to you more about how you are feeling and other people you have had contact with who might now also have the virus.  Also, [facility] is Partnering with the [facility] for Covid-19 research, you may be contacted directly by research staff.\"    ssessment (Inquire about Patient's current symptoms)   Assessment   Current Symptoms at time of phone call: (if no symptoms, document No symptoms]  None    Symptom onset (if applicable)  2nd positive      If at time of call, Patients symptoms hare worsened, the Patient should contact 911 or have someone drive them to Emergency Dept promptly:      If Patient calling 911, inform 911 personal that you have tested positive for the Coronavirus (COVID-19).  Place mask on and await 911 to arrive.    If Emergency Dept, If possible, please have another adult drive you to the Emergency Dept but you need to wear mask when in contact with other people.      Review information with Patient    Your result was positive. This means you have COVID-19 (coronavirus).  We have sent you a letter that reviews " the information that I'll be reviewing with you now.    How can I protect others?    If you have symptoms: stay home and away from others (self-isolate) until:    You've had no fever--and no medicine that reduces fever--for 1 full day (24 hours). And      Your other symptoms have gotten better. For example, your cough or breathing has improved. And     At least 10 days have passed since your symptoms started. (If you ve been told by a doctor that you have a weak immune system, wait 20 days.)     If you don't have symptoms: Stay home and away from others (self-isolate) until at least 10 days have passed since your first positive COVID-19 test. (Date test collected).    During this time:    Stay in your own room, including for meals. Use your own bathroom if you can.    Stay away from others in your home. No hugging, kissing or shaking hands. No visitors.     Don't go to work, school or anywhere else.     Clean  high touch  surfaces often (doorknobs, counters, handles, etc.). Use a household cleaning spray or wipes. You'll find a full list on the EPA website at www.epa.gov/pesticide-registration/list-n-disinfectants-use-against-sars-cov-2.     Cover your mouth and nose with a mask, tissue or other face covering to avoid spreading germs.    Wash your hands and face often with soap and water.    Caregivers in these groups are at risk for severe illness due to COVID-19:  o People 65 years and older  o People who live in a nursing home or long-term care facility  o People with chronic disease (lung, heart, cancer, diabetes, kidney, liver, immunologic)  o People who have a weakened immune system, including those who:  - Are in cancer treatment  - Take medicine that weakens the immune system, such as corticosteroids  - Had a bone marrow or organ transplant  - Have an immune deficiency  - Have poorly controlled HIV or AIDS  - Are obese (body mass index of 40 or higher)  - Smoke regularly    Caregivers should wear gloves  while washing dishes, handling laundry and cleaning bedrooms and bathrooms.    Wash and dry laundry with special caution. Don't shake dirty laundry, and use the warmest water setting you can.    If you have a weakened immune system, ask your doctor about other actions you should take.    For more tips, go to www.cdc.gov/coronavirus/2019-ncov/downloads/10Things.pdf.    You should not go back to work until you meet the guidelines above for ending your home isolation. You should meet these along with any other guidelines that your employer has.    Employers: This document serves as formal notice of your employee's medical guidelines for going back to work. They must meet the above guidelines before going back to work in person.    How can I take care of myself?    1. Get lots of rest. Drink extra fluids (unless a doctor has told you not to).    2. Take Tylenol (acetaminophen) for fever or pain. If you have liver or kidney problems, ask your family doctor if it's okay to take Tylenol.     Take either:     650 mg (two 325 mg pills) every 4 to 6 hours, or     1,000 mg (two 500 mg pills) every 8 hours as needed.     Note: Don't take more than 3,000 mg in one day. Acetaminophen is found in many medicines (both prescribed and over-the-counter medicines). Read all labels to be sure you don't take too much.    For children, check the Tylenol bottle for the right dose (based on their age or weight).    3. If you have other health problems (like cancer, heart failure, an organ transplant or severe kidney disease): Call your specialty clinic if you don't feel better in the next 2 days.    4. Know when to call 911: Emergency warning signs include:    Trouble breathing or shortness of breath    Pain or pressure in the chest that doesn't go away    Feeling confused like you haven't felt before, or not being able to wake up    Bluish-colored lips or face    5. Sign up for [employer]. We know it's scary to hear that you have  COVID-19. We want to track your symptoms to make sure you're okay over the next 2 weeks. Please look for an email from [employer]--this is a free, online program that we'll use to keep in touch. To sign up, follow the link in the email. Learn more at [email or web address].    Where can I get more information?    [facility]: [email or web address]    Coronavirus Basics: www.health.[facility].[place]./diseases/coronavirus/basics.html    What to Do If You're Sick: www.cdc.gov/coronavirus/2019-ncov/about/steps-when-sick.html    Ending Home Isolation: www.cdc.gov/coronavirus/2019-ncov/hcp/disposition-in-home-patients.html     Caring for Someone with COVID-19: www.cdc.gov/coronavirus/2019-ncov/if-you-are-sick/care-for-someone.html     [facility] clinical trials (COVID-19 research studies): clinicalaffairs.[facility]-clinical-trials     A Positive COVID-19 letter will be sent via [employer] or the Mail.  (Exception, no letters sent to Presurgerical/Preprocedure Patients)    [Name]  Romina Etienne RN  [facility]  COVID19 Results Team RN  Ph# [phone]

## 2021-06-13 NOTE — TELEPHONE ENCOUNTER
Patient would like lab orders for Dr. Valera faxed to [facility] in [place] @ [phone].

## 2021-06-13 NOTE — PROGRESS NOTES
Wanda Alva who presents today with a chief complaint of  Consult (pt states ROBERTO elevated dry eyes tingling upper extremity )      Joint Pains: sometimes  Location: bilateral elbows- both knees   Onset: intermmiten  Intensity:  0/10  AM Stiffness: 10 Minutes  Alleviating/Aggravating Factors: movement and consuming a lot of sugar  Tolerating Meds: yes- tylenol controls the aching  Other:       ROS:  Patient denies having any dry eyes, dry mouth, oral ulcers, alopecia, rashes, photosensitivity, history of psoriasis, chest pain, shortness of breath, cough, dysuria, history of kidney stones, abdominal pain, diarrhea, hematochezia, dysphagia, history of peptic ulcer disease, history of HIV, tuberculosis, hepatitis B or C, Lyme disease, seizure history, raynaud's, fevers, recent infections, difficulty sleeping, involuntary weight loss, [low back stiffness, weakness], fatigue, depression, anxiety, loss of appetite, numbness and tingling, [history of miscarriages, recent bone density, fracture history, inactivity stiffness, jaw and scalp pain, temporal headaches, recent URI or sinusitis, GERD, double vision, loss of vision or painful vision}      Problem List:  There is no problem list on file for this patient.       PMH:   Past Medical History:   Diagnosis Date     Arthritis      Carpal tunnel syndrome     right hand     Dry eye        Surgical History:  Past Surgical History:   Procedure Laterality Date     JOINT REPLACEMENT Left 2018    knee     REVISION TOTAL KNEE ARTHROPLASTY Left 9/23/2020    Procedure: LEFT KNEE REVISION POLYETHYLENE EXCHANGE AND PATELLAR  DEBRIDEMENT;  Surgeon: Benjamín Ballard MD;  Location: [facility];  Service: Orthopedics       Family History:  No family history on file.    Social History:   reports that she has never smoked. She has never used smokeless tobacco. She reports previous alcohol use. She reports that she does not use drugs.    Allergies:  Allergies   Allergen Reactions  "    Nickel Rash        Current Medications:  Current Outpatient Medications   Medication Sig Dispense Refill     acetaminophen (TYLENOL) 500 MG tablet Take 2 tablets (1,000 mg total) by mouth 3 (three) times a day.  0     amoxicillin (AMOXIL) 500 MG tablet Take 2,000 mg by mouth once as needed (for dental visits).        aspirin 81 mg chewable tablet Chew 1 tablet (81 mg total) 2 (two) times a day. 80 tablet 0     Bacillus coagulans/inulin (PROBIOTIC WITH PREBIOTIC ORAL) Take 1 capsule by mouth 2 (two) times a day.       calcium citrate-vitamin D3 (CITRACAL + D) 315 mg- 250 unit per tablet Take 1 tablet by mouth daily.       docusate sodium (COLACE) 100 MG capsule Take 1 capsule (100 mg total) by mouth 2 (two) times a day as needed for constipation.  0     fish oil-omega-3 fatty acids (FISH OIL) 300-1,000 mg capsule Take 1 g by mouth daily.       hydrOXYzine pamoate (VISTARIL) 25 MG capsule Take 1 capsule (25 mg total) by mouth every 6 (six) hours as needed for itching, anxiety or other (Spasms). 30 capsule 0     multivitamin therapeutic tablet Take 1 tablet by mouth daily.       polyvinyl alcohol (LIQUIFILM TEARS) 1.4 % ophthalmic solution Administer 1 drop to both eyes as needed for dry eyes.       No current facility-administered medications for this visit.    Following up today via phone visit, per Covid-19 pandemic requirements.    Verbal consent has been obtained for this service by a care team member.    Wanda Alva is a 60 y.o. female who is being evaluated via a billable video visit.      The patient has been notified of following:     \"This video visit will be conducted via a call between you and your physician/provider. We have found that certain health care needs can be provided without the need for an in-person physical exam.  This service lets us provide the care you need with a video conversation.  If a prescription is necessary we can send it directly to your pharmacy.  If lab work is needed we " "can place an order for that and you can then stop by our lab to have the test done at a later time.    Video visits are billed at different rates depending on your insurance coverage. Please reach out to your insurance provider with any questions.    If during the course of the call the physician/provider feels a video visit is not appropriate, you will not be charged for this service.\"    Patient has given verbal consent to a Video visit? Yes  How would you like to obtain your AVS? AVS Preference: MyChart. declined   If dropped by the video visit, the video invitation should be sent to: Text to cell phone: [phone]  Will anyone else be joining your video visit? No        Video Start Time: 4:38 PM    Additional provider notes:       Video-Visit Details  Pt states she's in [place] during the video visit  Type of service:  Video Visit    Video End Time (time video stopped):  5:13 PM  Originating Location (pt. Location): Home    Distant Location (provider location):  [facility]     Platform used for Video Visit: Teamleader      Summary/Assessment:      Pleasant 60-year-old female presents with positive ROBERTO.    Patient states that she has had positive ROBERTO levels for the past year.  Has a sister with lupus.    States titer is 1-40.      Noted to have labs in July which showed positive ROBERTO along with positive SCL-70 antibody.  Unremarkable CBC and ESR.    States had some left knee pain which improved after having left knee revision surgery in September 2020, had left knee replacement in December 2018.  Occasionally has some elbow discomfort which are mild and self-limiting.  Denies having any joint swelling.  Attributes some increased arthralgias secondary to being more homebound because of Covid and getting about 15 pounds.  [facility] [employer] [facility] [place] [facility]    Review of systems was unremarkable.  Denies any history of miscarriages.    Denies having Raynaud's-like " symptoms.    On video exam no clear signs of sclerodactyly involving hands appreciated.    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  With time this rash improved.  Denies any history of diabetes.    Has occasional hand paresthesias first in the morning lasting about a minute, possibly related to carpal tunnel, wore splints in the past which were beneficial, has not been utilizing lately..    Takes calcium and vitamin D.    Please see below for management plan.      Pertinent rheumatology/past medical history (please refer to above for more detailed history):      Positive ROBERTO    Positive SCL-70 antibody    Sister with lupus    Dry eyes    History left knee replacement followed by revision (12/2018 and 9/2020 respectively)    Occasionally knee and elbow pains (mild and self-limiting)    Granuloma annulare    Hand paresthesias, bilateral, possible CTS      Rheumatology medications provided/suggested:          Pertinent medication from other providers or from otc (please refer to above for more detailed med list):    Calcium  Vitamin D  Baby aspirin      Pertinent medications already tried:           Pertinent lab history:    Positive/elevated: ROBERTO, SCL-70 antibody    Negative/unremarkable: Other ROBERTO related subsets, CBC, ESR, creatinine      Pertinent imaging/test history:          Other:    , retired, used to work as a nursing assistant, 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  [place] moved from [facility] [place] [facility], has a son in the area here.    Denies regular alcohol beverage intake or tobacco use.      Plan:      To complete work-up will obtain some additional autoimmune/inflammatory markers and correlate clinically.    Given positive ROBERTO and SCL-70 antibody and family history for lupus, will continue to monitor for signs symptoms consistent with having associated connective tissue disease and manage  accordingly.    Made aware that if develops any chest pain, shortness of breath, cough or leg edema in addition to seeking medical attention, should notify us or other addressing provider to consider obtaining echocardiogram given positive SCL-70 antibody.    Follow-up in 3 months.      Procedure note:         Major side effect profile of medications provided/suggested were discussed with the patient.    This note was transcribed using Dragon voice recognition software as a result unintentional grammatic al errors or word substitutions may have occurred. Please contact our Rheumatology department if you need any clarification or if you have any related inquiries.    Thank you for referring this patient to our clinic.

## 2021-06-13 NOTE — PATIENT INSTRUCTIONS - HE
Summary of Your Rheumatology Visit    Next Appointment:  3 Months    Medications:      Referrals:      Tests:     Please have labs that were ordered performed.       Injections:        Other:

## 2021-06-14 NOTE — TELEPHONE ENCOUNTER
Labs from outside source dated 12/18/2020 reviewed.    Noted to have indeterminate cardiolipin IgM antibody, recommend repeating cardiolipin antibodies in about 3 months. If repeat level returns positive we typically then recommend patients to take a baby aspirin daily to help prevent potential slightly higher risk for blood clots. Regardless, patient is already on a baby aspirin daily (on med list).    Remainder of lab results were unremarkable.

## 2021-06-14 NOTE — TELEPHONE ENCOUNTER
Pt is returning your call re LabTest results .  She asked that you call her back after 2p.m. today since she is at work unitl then.  [phone]    Thank you

## 2021-06-14 NOTE — TELEPHONE ENCOUNTER
Pt notified of lab results. Pt would like lab orders faxed to [facility] in [place] and she will have them drawn there

## 2021-06-16 NOTE — TELEPHONE ENCOUNTER
Patient called checking to see if Dr Valera had received result of labs she had drawn at the [facility] in [facility].  No results have been received.  Patient states she will contact that lab and have them resent to [facility] fax # today.

## 2021-06-25 NOTE — TELEPHONE ENCOUNTER
Pt notified of Dr Valera's lab comments. Pt does not take baby aspirin. Pt will follow up on as needed basis or if symptoms change.

## 2021-06-25 NOTE — TELEPHONE ENCOUNTER
Addendum: To earlier lab result message sent today, I believe only saw this patient once before in November 2020, as suggested after initial encounter, recommend patient schedule a follow-up reassessment, preferably in clinic.

## 2021-06-25 NOTE — TELEPHONE ENCOUNTER
Please mention to the patient that repeat IgM cardiolipin antibody again returned indeterminate with latest level lower compared to prior level (previous level was 20 current level 15), at this time given that none of these 2 levels were truly positive, does not need to take a baby aspirin daily for this purpose alone however, if patient has risk factors for cardiovascular disease then patient may want to discuss with their primary care physician, regarding considering adding a baby aspirin daily for preventative purposes.

## 2021-11-23 ENCOUNTER — OFFICE VISIT - RIVER FALLS (OUTPATIENT)
Dept: FAMILY MEDICINE | Facility: CLINIC | Age: 61
End: 2021-11-23

## 2022-02-11 VITALS
BODY MASS INDEX: 29.99 KG/M2 | SYSTOLIC BLOOD PRESSURE: 122 MMHG | WEIGHT: 178.4 LBS | OXYGEN SATURATION: 97 % | HEIGHT: 65 IN | TEMPERATURE: 98.1 F | WEIGHT: 186.6 LBS | DIASTOLIC BLOOD PRESSURE: 78 MMHG | OXYGEN SATURATION: 96 % | HEART RATE: 80 BPM | BODY MASS INDEX: 29.72 KG/M2 | HEIGHT: 66 IN | TEMPERATURE: 98.3 F | DIASTOLIC BLOOD PRESSURE: 70 MMHG | SYSTOLIC BLOOD PRESSURE: 108 MMHG | HEART RATE: 66 BPM

## 2022-02-12 VITALS
OXYGEN SATURATION: 99 % | DIASTOLIC BLOOD PRESSURE: 80 MMHG | HEART RATE: 73 BPM | TEMPERATURE: 97.8 F | DIASTOLIC BLOOD PRESSURE: 80 MMHG | HEART RATE: 72 BPM | TEMPERATURE: 97.2 F | TEMPERATURE: 98.1 F | SYSTOLIC BLOOD PRESSURE: 120 MMHG | BODY MASS INDEX: 29.92 KG/M2 | DIASTOLIC BLOOD PRESSURE: 68 MMHG | OXYGEN SATURATION: 98 % | HEART RATE: 71 BPM | SYSTOLIC BLOOD PRESSURE: 118 MMHG | WEIGHT: 181.8 LBS | SYSTOLIC BLOOD PRESSURE: 110 MMHG | BODY MASS INDEX: 30.25 KG/M2 | WEIGHT: 179.8 LBS

## 2022-02-12 VITALS
TEMPERATURE: 98.9 F | WEIGHT: 184.4 LBS | DIASTOLIC BLOOD PRESSURE: 78 MMHG | OXYGEN SATURATION: 98 % | BODY MASS INDEX: 30.69 KG/M2 | SYSTOLIC BLOOD PRESSURE: 128 MMHG | HEART RATE: 73 BPM

## 2022-02-12 VITALS
DIASTOLIC BLOOD PRESSURE: 66 MMHG | BODY MASS INDEX: 29.85 KG/M2 | SYSTOLIC BLOOD PRESSURE: 100 MMHG | HEART RATE: 69 BPM | TEMPERATURE: 97.3 F | OXYGEN SATURATION: 99 % | WEIGHT: 179.4 LBS

## 2022-02-12 VITALS
HEART RATE: 74 BPM | SYSTOLIC BLOOD PRESSURE: 122 MMHG | WEIGHT: 175.4 LBS | OXYGEN SATURATION: 98 % | HEIGHT: 65 IN | HEIGHT: 65 IN | BODY MASS INDEX: 29.22 KG/M2 | DIASTOLIC BLOOD PRESSURE: 68 MMHG | BODY MASS INDEX: 29.19 KG/M2

## 2022-02-12 VITALS
WEIGHT: 183.6 LBS | SYSTOLIC BLOOD PRESSURE: 104 MMHG | RESPIRATION RATE: 16 BRPM | HEIGHT: 65 IN | OXYGEN SATURATION: 99 % | BODY MASS INDEX: 30.59 KG/M2 | DIASTOLIC BLOOD PRESSURE: 78 MMHG | HEART RATE: 63 BPM

## 2022-02-15 NOTE — TELEPHONE ENCOUNTER
---------------------  From: Odalys Vanessa CMA   Sent: 4/29/2021 9:35:36 AM CDT  Subject: General Message-lab results     Phone Message    PCP:         Time of Call:  [phone]       Person Calling:  self    Note:   calling to let us know pt's rheumatologist-Dr Valera never rec'd her labs from 3/31  verified wrong fax # entered - correct fax # [phone] -  labs faxed, confirmation rec'd

## 2022-02-15 NOTE — PROGRESS NOTES
Chief Complaint    c/o all over joint pain, dry eyes present for 1 year.  History of Present Illness      patient present to clinic today for bilateral arm numbness, elbow pain and occasional neck pain.  She also has left knee clicking and pain following her LKA      About a month ago patient was helping her son with some remodeling.  She is doing quite a bit of painting and since then has been having problems with her elbows having pain.  It is worse with increased activity.  She also has been experiencing fullness of her arms going down and became tingly.  She is unable to recall which fingers are affected but she reports that her entire arm will go numb.  Pain at the elbows enzymes medial aspect and seems to worsen with increased activity.  Looking up to the left or to the right does not cause worsening of her symptoms       Patient also has a history of left knee replacement.  She has had knee clunking and has been seen by Ortho.  She has a way stressed that her thought of having to have surgery because her postoperative course has been quite difficult she could certainly talk with more than 1 surgeon for treatment options.      Review of systems is negative except as per HPI including:  no fevers, chills, sore throat, runny nose, nausea, vomiting, constipation, diarrhea, rash or new skin lesions, chest pain, palpitations, slurred speech, new paresthesia, shortness of breath or wheeze.      Exam:      General: alert and oriented ×3 no acute distress.      HEENT: pupils are equal round and reactive to light extraocular motion is intact. Normocephalic and atraumatic.       Hearing is grossly normal and there is no otorrhea.       Nares are patent there is no rhinorrhea.       Mucous membranes are moist and pink.      Chest: has bilateral rise with no increased work of breathing.      Cardiovascular: normal perfusion and brisk capillary refill.      Musculoskeletal: no gross focal abnormalities and normal gait.   There is an audible clicking sound as patient flexes and extends her left knee.  Bilateral elbows are tender at the medial epicondyles there is worsening of pain with pronation against resistance of her elbows.  She has a negative Tinel's.      Patient reports that she was seen by specialist and they had told her to let her PCP know that she had had a positive ROBERTO test.  Patient is also learned that she has a family history of 2 family members with lupus.  We did discuss that a family history of an autoimmune disorder does put her at increased risk of developing an autoimmune disorder and if she develops new symptoms she should let me know.      She was seen by dermatology for a rash on her extremities.  She was diagnosed with granuloma annulare.  It has pretty much resolved at this point.        Neuro: no gross focal abnormalities and memory seems intact.      Psychiatric: speech is clear and coherent and fluent. Patient dressed appropriately for the weather. Mood is appropriate and affect is full.                     Discussed with patient to return to clinic if symptoms worsen or do not improve  Physical Exam   Vitals & Measurements    T: 97.8   F (Tympanic)  HR: 73(Peripheral)  BP: 120/80  SpO2: 99%     WT: 181.8 lb   Assessment/Plan       ROBERTO positive (R76.8)        We will monitor for symptoms of autoimmune disease.  Patient will follow-up with me as needed.         Ordered:          20346 office outpatient visit 25 minutes (Charge), Quantity: 1, Granuloma annulare  ROBERTO positive  Family history of lupus erythematosus  Left knee pain  Arm weakness  Arm pain, right  Arm paresthesia, left  Elbow pain                Arm pain, right (M79.601)        As noted below         Ordered:          51927 office outpatient visit 25 minutes (Charge), Quantity: 1, Granuloma annulare  ROBERTO positive  Family history of lupus erythematosus  Left knee pain  Arm weakness  Arm pain, right  Arm paresthesia, left  Elbow  pain          XR Cervical Spine 3 Views (Request), Arm paresthesia, left  Arm pain, right  Arm weakness                Arm paresthesia, left (R20.2)        As noted below         Ordered:          45819 office outpatient visit 25 minutes (Charge), Quantity: 1, Granuloma annulare  ROBERTO positive  Family history of lupus erythematosus  Left knee pain  Arm weakness  Arm pain, right  Arm paresthesia, left  Elbow pain          XR Cervical Spine 3 Views (Request), Arm paresthesia, left  Arm pain, right  Arm weakness                Arm weakness (R29.898)        We will plan to get a cervical spine film and could consider EMG testing or MRI to help determine if this is due to a radiculopathy or spinal stenosis versus a peripheral neuropathy         Ordered:          76818 office outpatient visit 25 minutes (Charge), Quantity: 1, Granuloma annulare  ROBERTO positive  Family history of lupus erythematosus  Left knee pain  Arm weakness  Arm pain, right  Arm paresthesia, left  Elbow pain          XR Cervical Spine 3 Views (Request), Arm paresthesia, left  Arm pain, right  Arm weakness                Elbow pain (M25.521)        Suspect that she has bilateral medial epicondylitis.  Referred her to occupational therapy.  Also suggested to do Rooks cup ice massages and can try Voltaren gel.         Ordered:          diclofenac topical, 2-4 gm, Topical, qid, Instructions: not to exceed 32 grams/day, # 1 EA, 11 Refill(s), Type: Maintenance, Pharmacy: [id number], 2-4 gm Topical qid,Instr:not to exceed 32 grams/day, 65, in, 10/02/19 9:35:00 CDT, Height Measured, 181.8,..., (Ordered)          38527 office outpatient visit 25 minutes (Charge), Quantity: 1, Granuloma annulare  ROBERTO positive  Family history of lupus erythematosus  Left knee pain  Arm weakness  Arm pain, right  Arm paresthesia, left  Elbow pain          Referral (Request), 06/18/20 9:57:00 CDT, Referred to: Occupational Therapy, Elbow  pain                Family history of lupus erythematosus (Z84.0)        We will continue to be diligent signs and symptoms of autoimmune disease         Ordered:          92244 office outpatient visit 25 minutes (Charge), Quantity: 1, Granuloma annulare  ROBERTO positive  Family history of lupus erythematosus  Left knee pain  Arm weakness  Arm pain, right  Arm paresthesia, left  Elbow pain                Granuloma annulare (L92.0)        This is resolving.         Ordered:          20757 office outpatient visit 25 minutes (Charge), Quantity: 1, Granuloma annulare  ROBERTO positive  Family history of lupus erythematosus  Left knee pain  Arm weakness  Arm pain, right  Arm paresthesia, left  Elbow pain                Left knee pain (M25.562)        Patient has concerns about proceeding with surgery.  Recommended she consider talking with another surgeon to see if they told her the same thing.  She certainly does not have to have surgery until she feels ready for it.         Ordered:          14915 office outpatient visit 25 minutes (Charge), Quantity: 1, Granuloma annulare  ROBERTO positive  Family history of lupus erythematosus  Left knee pain  Arm weakness  Arm pain, right  Arm paresthesia, left  Elbow pain          Physical Therapy (Request), Left knee pain                Orders:         Physical Therapy Evaluation and Treatment (Request), Instructions: female pelvic floor PT, Urinary urgency  Lower urinary tract symptoms (LUTS)      25 minutes spent with patient in direct face to face contact, > 50% of time spent counseling and coordinating care.   Patient Information     Name:SANDRA FERNANDEZ      Address:      [id number] [place] [id number]     Sex:Female     YOB: 1960     [phone]     Emergency Contact:URMILA FERNANDEZ     MRN:[id number]     FIN:[id number]     Location:[facility]     Date of Service:06/18/2020      Primary Care Physician:        NONE ,       Attending Physician:       [facility], [phone]  Problem List/Past Medical History    Ongoing     Hyperlipidemia     Obesity     Osteoarthritis of left knee     Ovarian mass     Prediabetes    Historical     Inpatient stay       Comments: @[facility], [place] - Primary osteoarthritis of left knee     Pregnancy     Pregnancy     Varicella       Comments: childhood  Procedure/Surgical History     Arthroplasty of knee (12/19/2018)      Comments: Left.     Injection of steroid (04/12/2018)      Comments: Left knee..     Excision (12/19/2017)      Comments: Mass in soft tissue of left upper arm..     Injection of steroid (12/14/2017)      Comments: Left knee..     Injection of steroid (07/27/2017)      Comments: Left knee..     Arthroscopy of knee (2014)      Comments: Left knee.. Partial medial meniscectomy..     Colonoscopy (2005)     LASIK     Surgical procedure      Comments: Vein surgery, left leg..  Medications    Fish Oil 1000 mg oral capsule, 1000 mg= 1 cap(s), Oral, bid    Flax Seed Oil    Multi Vitamin+    Voltaren 1% topical gel, 2-4 gm, Topical, qid, 11 refills  Allergies    Nickel (Rash)  Social History    Smoking Status - 06/18/2020     Never smoker     Alcohol      Never, 09/06/2018     Employment/School      Retired, Work/School description: CNA. Highest education level: [employer] 09/06/2019     Exercise      Exercise frequency: 3-4 times/week. Exercise type: Swimming, Yoga., 09/06/2018     Home/Environment      Marital status: . Spouse/Partner name: Nathan. 2 children. Living situation: Home/Independent. Injuries/Abuse/Neglect in household: No. Feels unsafe at home: No. Family/Friends available for support: Yes., 09/06/2019     Nutrition/Health      Diet: Low carbs. Type of diet: Low carbohydrate. Wants to lose weight: Yes. Sleeping concerns: No. Feels highly stressed: No., 09/06/2019     Sexual      Sexually active: Yes. Identifies as female, Sexual  orientation: Straight or heterosexual. History of STD: No. Uses condoms: Yes. History of sexual abuse: No., 09/06/2019     Substance Abuse      Never, 09/06/2018     Tobacco      Never (less than 100 in lifetime), 09/06/2018  Family History    Brain tumor: Aunt (P) (Dx at 55).    CABG - Coronary artery bypass graft: Father (Dx at 60).    COPD - Chronic obstructive pulmonary disease: Father.    Cancer: Aunt and Uncle.    Cancer in situ of lung: Aunt.    Diabetes mellitus type II: Mother and Father.    HA - Headache: Aunt.    Hyperlipidemia: Father.    Hypertension: Mother.    MI - Myocardial infarction: Father (Dx at 50).    Obese: Mother.    Stroke: Father.    Thyroid disease: Mother (Dx at 72).  Immunizations      Vaccine Date Status          influenza virus vaccine, inactivated 09/04/2019 Given          zoster vaccine, inactivated 09/04/2019 Given          influenza virus vaccine, inactivated 12/20/2018 Recorded          influenza virus vaccine, inactivated 10/13/2017 Recorded          influenza virus vaccine, inactivated 11/17/2014 Recorded          influenza virus vaccine, inactivated 09/18/2013 Recorded          influenza virus vaccine, inactivated 09/11/2012 Recorded          tetanus/diphth/pertuss (Tdap) adult/adol 09/11/2012 Recorded          Td 12/15/2005 Recorded          MMR (measles/mumps/rubella) 12/12/2005 Recorded          Hep B 09/03/1993 Recorded          Hep B 02/03/1993 Recorded          Hep B 01/05/1993 Recorded          MMR (measles/mumps/rubella) 02/01/1972 Recorded          OPV 01/01/1971 Recorded          OPV 03/01/1970 Recorded          OPV 01/01/1967 Recorded          OPV 11/01/1964 Recorded          OPV 09/01/1964 Recorded          Td 02/01/1963 Recorded          Td 11/03/1961 Recorded          Td 06/28/1961 Recorded          Td 03/30/1961 Recorded

## 2022-02-15 NOTE — PROGRESS NOTES
Chief Complaint    Physical  History of Present Illness      Pt here today for annual exam      outside records reviewed, last pap 7/18/2016 ws nml cytology and no HR HPV      Her last colonoscopy was done at the age of 50 and was normal.  She reports she had no polyps found and has no family history of polyps or colon cancer and was told that she could wait until she was 60 before she needed her next colonoscopy.      She has a mammogram scheduled.      She has a remote history of an ovarian mass.  She denies any bloating or abdominal pain and early satiety.  Would like to get ultrasound repeated to follow-up on the ovarian mass.      She has a spot on the bridge of her nose that feels drying crusting of the been present for months.  She is been using over-the-counter lotion for it and it will not heal or resolve.  She also has a rash that started approximately 3 weeks ago.  It is occasionally itchy it started off as a deep red and now is more of a purplish color.  It slowly resolving.  Is occasionally itchy.  She cannot recall any new skin care products or beauty products or laundry detergents or chemicals that she is coming to contact with the cause it and denies any other symptoms of illness including no fevers chills malaise fatigue.  She reports that is been on her anterior wrist.  Her posterior thigh and leg and ankles.      She reports a history of constipation that is controlled by taking probiotics.  She is interested in getting her thyroid testing done but would prefer to do direct access testing for this today.      Review of systems is negative except as per HPI including no fevers, chills, sore throat, runny nose, nausea, vomiting, constipation, diarrhea, rash or new skin lesions, chest pain, palpitations, slurred speech, new paresthesia, shortness of breath or wheeze.             She has urinary frequency and some urgency.  She will start to dribble if she has to walk from the pool to the shower.  She  is drinking about a gallon of water a day.  She is not able to stop her flow of urine while voiding.  She is not sure if she is doing her Kegel exercises effectively and declines referral to urogynecology at this time.      Exam:      see vitals listed below      General: alert and oriented ×3 no acute distress.      HEENT: Normocephalic and atraumatic.       Eyes pupils are equal round and reactive to light extraocular motion is intact. normal conjunctiva      Hearing is grossly normal and there is no otorrhea. Tympanic membranes are pearly grey with a normal light reflex.      Nares are patent there is no rhinorrhea.       Mucous membranes are moist and pink.      Chest: has bilateral rise with no increased work of breathing. clear to auscultation without wheezes, rhonchi, or rales.      Cardiovascular: normal perfusion and brisk capillary refill. S1S2 with regular rate and rhythm and no murmurs, gallops or rubs.      Musculoskeletal: no gross focal abnormalities and normal gait.      Neuro: no gross focal abnormalities and memory seems intact.  CN 2-12 are grossly intact.      Psychiatric: speech is clear and coherent and fluent. Patient dressed appropriately for the weather. Mood is appropriate and affect is full.      Abd: no rebound or guarding, normal BS      Skin: Bridge of the nose has a scaly lesion that is about 1 mm in size.  Feels rough and easier to feel than it is to see.      Patient has purplish raised macules and papules that are confluent on her posterior thigh and extending towards the knee.  She is got some smaller patches by her ankle and wrist.  They luis.      Breast:  Pt declined              patient declined      Discussed:      using sunscreen, protecting from sunburn, regular self skin checks      refer to usdachoosemyplate.gov, AHA and ADA for diet and exercise recommendations      consume [id number] mg calcium daily      do weight bearing exercises      can get mammo at hospital by  calling up and scheduling, do not need an order from me      get 120min /week of aerobic exercise      may try to get shingrix from local pharmacy, she is a candidate but there is a national shortage      up to date on colon cancer screening      may use vegetable oil for vaginal lubrication if intercourse is painful or may consider R/B of HRT systemic or local  Physical Exam   Vitals & Measurements    HR: 74(Peripheral)  BP: 122/68  SpO2: 98%     HT: 65 in  WT: 175.4 lb  BMI: 29.18   Assessment/Plan       Constipation (K59.00)                Dry skin (L85.3)                Immunization due (Z23)         Ordered:          influenza virus vaccine, inactivated, 0.5 mL, IM, once, (Completed)          zoster vaccine, inactivated, 0.5 mL, IM, once, (Completed)          13812 imadm prq id subq/im njxs 1 vaccine (Charge), Quantity: 1, Immunization due          89378 imadm prq id subq/im njxs 1 vaccine (Charge), Quantity: 1, Immunization due          02248 Influenza virus vaccine, quadrivalent, riv4 (Charge), Quantity: 1, Immunization due          58419 hzv zoster vacc recombinant adjuvanted im use (Charge), Quantity: 1, Immunization due                Lower urinary tract symptoms (LUTS) (R39.9)         Ordered:          Physical Therapy Evaluation and Treatment (Request), Instructions: female pelvic floor PT, Urinary urgency  Lower urinary tract symptoms (LUTS)                Ovarian mass (N83.9)         Ordered:          US Pelvic (Request), Ovarian mass                Urinary urgency (R39.15)         Ordered:          Physical Therapy Evaluation and Treatment (Request), Instructions: female pelvic floor PT, Urinary urgency  Lower urinary tract symptoms (LUTS)                Vasculitis (I77.6)         Ordered:          Referral (Request), 09/04/19 15:59:00 CDT, Referred to: Dermatology, Vasculitis                Well adult exam (Z00.00)         Ordered:          29889 periodic preventive med est patient 40-64yrs  (Charge), Quantity: 1, Well adult exam                Orders:         Basic Metabolic Panel* ([facility]), Specimen Type: Serum, Collection Date: 09/04/19 15:30:00 CDT         Hemoglobin A1c* ([facility]), Specimen Type: Blood, Collection Date: 09/04/19 15:30:00 CDT         Lipid panel with reflex to direct ldl* ([facility]), Specimen Type: Serum, Collection Date: 09/04/19 15:30:00 CDT  Patient Information     Name:SANDRA FERNANDEZ      Address:      [address] [address]     Sex:Female     YOB: 1960     [phone]     Emergency Contact:URMILA FERNANDEZ     MRN:[id number]     FIN:[id number]     Location:[facility]     Date of Service:09/04/2019      Primary Care Physician:       DORIS       Attending Physician:       Allysno CARROLL, [facility], [phone]  Problem List/Past Medical History    Ongoing     Hyperlipidemia     Obesity     Osteoarthritis of left knee     Ovarian mass     Prediabetes    Historical     Inpatient stay       Comments: @[place], [place] - Primary osteoarthritis of left knee     Varicella       Comments: childhood  Procedure/Surgical History     Arthroplasty of knee (12/19/2018)            Comments: Left.     Injection of steroid (04/12/2018)            Comments: Left knee..     Excision (12/19/2017)            Comments: Mass in soft tissue of left upper arm..     Injection of steroid (12/14/2017)            Comments: Left knee..     Injection of steroid (07/27/2017)            Comments: Left knee..     Arthroscopy of knee (2014)            Comments: Left knee.. Partial medial meniscectomy..     Colonoscopy (2005)           LASIK           Surgical procedure            Comments: Vein surgery, left leg..  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 Exercise type: Swimming, Yoga., 09/06/2018     Home/Environment      Marital status: . Spouse/Partner name: Nathan. 2 children. Risks in environment: Stairs., 09/04/2019     Nutrition/Health      Diet: Low carbs. Type of diet: Calorie restricted. Vitamin/Supplements: Multivitamins and probiotics. Wants to lose weight: Yes. Feels highly stressed: No., 09/04/2019     Sexual      Sexually active: Yes. Identifies as female, Sexual orientation: Straight or heterosexual. Uses condoms: Yes., 09/06/2018     Substance Abuse      Never, 09/06/2018     Tobacco      Never (less than 100 in lifetime), 09/06/2018  Family History    Brain tumor: Aunt (P) (Dx at 55).    CABG - Coronary artery bypass graft: Father (Dx at 60).    COPD - Chronic obstructive pulmonary disease: Father.    Diabetes mellitus type II: Mother and Father.    HA - Headache: Aunt.    Hyperlipidemia: Father.    Hypertension: Mother.    MI - Myocardial infarction: Father (Dx at 50).    Obese: Mother.    Stroke: Father.    Thyroid disease: Mother (Dx at 72).  Immunizations      Vaccine Date Status      influenza virus vaccine, inactivated 09/04/2019 Given      zoster vaccine, inactivated 09/04/2019 Given      influenza virus vaccine, inactivated 10/13/2017 Recorded      influenza virus vaccine, inactivated 11/17/2014 Recorded      influenza virus vaccine, inactivated 09/18/2013 Recorded      tetanus/diphth/pertuss (Tdap) adult/adol 09/11/2012 Recorded      Td 12/15/2005 Recorded      MMR (measles/mumps/rubella) 12/12/2005 Recorded      Hep B 09/03/1993 Recorded      Hep B 02/03/1993 Recorded      Hep B 01/05/1993 Recorded      MMR (measles/mumps/rubella) 02/01/1972 Recorded      OPV 01/01/1971 Recorded      OPV 03/01/1970 Recorded      OPV 01/01/1967 Recorded      OPV 11/01/1964 Recorded      OPV 09/01/1964  Recorded

## 2022-02-15 NOTE — LETTER
(Inserted Image. Unable to display)         February 25, 2021        SANDRA FERNANDEZ  [id number] [place], [place] [id number]        Dear SANDRA,     Thank you for selecting [facility] for your healthcare needs. Below you will find the results of your recent test(s) done at our clinic.      Your pap smear cells look normal and you do not have high risk HPV, so your next pap smear should be in 5 years.        Result Name Current Result   Thinprep Report   2/19/2021     Please contact my practice at [phone] if you have any questions or concerns.     Sincerely,        Kristine Valadez MD      What do your labs mean?  Below is a glossary of commonly ordered labs:  LDL   Bad Cholesterol   HDL   Good Cholesterol  AST/ALT   Liver Function   Cr/Creatinine   Kidney Function  Microalbumin   Kidney Function  BUN   Kidney Function  PSA   Prostate    TSH   Thyroid Hormone  HgbA1c   Diabetes Test   Hgb (Hemoglobin)   Red Blood Cells

## 2022-02-15 NOTE — LETTER
(Inserted Image. Unable to display)       September 30, 2020        SANDRA JIM  [id number] [place] [place], [place] [id number]      Dear SANDRA,      Thank you for selecting [facility] for your healthcare needs.      I just read your operative report.  It looks like surgery went well!  I hope you are recovering well and are feeling better.          Please contact my practice at [phone] if you have any questions or concerns.     Sincerely,        Kristine Valadez MD

## 2022-02-15 NOTE — TELEPHONE ENCOUNTER
---------------------  From: Arely Dyson CMA (Phone Messages Pool ([id number]_[facility]))   To: [facility] Message Pool ([id number]_[place] [facility] [place]);     Sent: 4/15/2021 8:37:59 AM CDT  Subject: FW: F/U cholesterol/vaccine           ---------------------  From: SANDRA FERNANDEZ  To: [facility]  Sent: 04/14/2021 06:51 p.m. CDT  Subject: F/U cholesterol/vaccine  I have not heard back from Dr. Valadez. Thinking if I continue to workout and the eat better choices. Maybe include a few supplements like Q10  I can put off starting medication? I m reading  it can cause dementia. My memory isn t that great in the first place. ??    Also can you help me to get on some list for the vaccine. [facility] s first available shot is not available till after May 15.  However my only available before then would be Monday April 19 or Tuesday 20th. Or  Monday May 3rd or Tuesday 4th.  Thx.Pt transferred to scheduling for covid vaccine.

## 2022-02-15 NOTE — NURSING NOTE
Comprehensive Intake Entered On:  9/2/2020 1:19 PM CDT    Performed On:  9/2/2020 1:13 PM CDT by Carlene Shabazz CMA   Chief Complaint :   Pre-op. DOS 9/23/2020- Dr. Montoya- Left Knee- [place]- [facility].    Menstrual Status :   Postmenopausal   Weight Measured :   184.4 lb(Converted to: 184 lb 6 oz, 83.64 kg)    Systolic Blood Pressure :   128 mmHg   Diastolic Blood Pressure :   78 mmHg   Mean Arterial Pressure :   95 mmHg   Peripheral Pulse Rate :   73 bpm   BP Site :   Right arm   BP Method :   Manual   HR Method :   Electronic   Temperature Tympanic :   98.9 DegF(Converted to: 37.2 DegC)    Oxygen Saturation :   98 %   Carlene Shabazz CMA - 9/2/2020 1:13 PM CDT   Health Status   Allergies Verified? :   Yes   Medication History Verified? :   Yes   Pre-Visit Planning Status :   N/A   Tobacco Use? :   Never smoker   Carlene Shabazz CMA - 9/2/2020 1:13 PM CDT   Consents   Consent for Immunization Exchange :   Consent Granted   Consent for Immunizations to Providers :   Consent Granted   Carlene Shabazz CMA - 9/2/2020 1:13 PM CDT   Meds / Allergies   (As Of: 9/2/2020 1:19:28 PM CDT)   Allergies (Active)   Nickel  Estimated Onset Date:   Unspecified ; Reactions:   Rash ; Created By:   Odalys García; Reaction Status:   Active ; Category:   Drug ; Substance:   Nickel ; Type:   Allergy ; Updated By:   Odalys García; Reviewed Date:   6/25/2020 2:09 PM CDT        Medication List   (As Of: 9/2/2020 1:19:28 PM CDT)   Prescription/Discharge Order    diclofenac topical  :   diclofenac topical ; Status:   Prescribed ; Ordered As Mnemonic:   Voltaren 1% topical gel ; Simple Display Line:   2-4 gm, Topical, qid, not to exceed 32 grams/day, 1 EA, 11 Refill(s) ; Ordering Provider:   Kristine Valadez MD; Catalog Code:   diclofenac topical ; Order Dt/Tm:   6/18/2020 9:42:04 AM CDT            Home Meds    calcium citrate  :   calcium citrate ; Status:   Documented ; Ordered As Mnemonic:   calcium (as calcium citrate) 250 mg  oral tablet ; Simple Display Line:   250 mg, 1 tab(s), Oral, daily, 0 Refill(s) ; Catalog Code:   calcium citrate ; Order Dt/Tm:   6/25/2020 2:14:00 PM CDT          cholecalciferol  :   cholecalciferol ; Status:   Documented ; Ordered As Mnemonic:   Vitamin D3 ; Simple Display Line:   1 tab, Oral, daily, 0 Refill(s) ; Catalog Code:   cholecalciferol ; Order Dt/Tm:   6/25/2020 2:13:55 PM CDT          magnesium amino acids chelate  :   magnesium amino acids chelate ; Status:   Documented ; Ordered As Mnemonic:   magnesium amino acids chelate ; Simple Display Line:   1 tab, Oral, daily, 0 Refill(s) ; Catalog Code:   magnesium amino acids chelate ; Order Dt/Tm:   6/25/2020 2:12:08 PM CDT          multivitamin  :   multivitamin ; Status:   Documented ; Ordered As Mnemonic:   Multi Vitamin+ ; Simple Display Line:   1 tab daily, 0 Refill(s) ; Catalog Code:   multivitamin ; Order Dt/Tm:   8/29/2018 9:24:25 AM CDT          omega-3 polyunsaturated fatty acids  :   omega-3 polyunsaturated fatty acids ; Status:   Documented ; Ordered As Mnemonic:   Fish Oil 1000 mg oral capsule ; Simple Display Line:   1,000 mg, 1 cap(s), Oral, bid, 0 Refill(s) ; Catalog Code:   omega-3 polyunsaturated fatty acids ; Order Dt/Tm:   8/29/2018 9:24:08 AM CDT            ID Risk Screen   Recent Travel History :   No recent travel   Family Member Travel History :   No recent travel   Other Exposure to Infectious Disease :   Unknown   COVID-19 Testing Status :   Positive COVID-19 test in the last 30 days   [place] COVID [place]   [facility]   [place] Carlene MARTINEZ - 9/2/2020 1:13 PM CDT

## 2022-02-15 NOTE — PROGRESS NOTES
Patient Information     Name:SANDRA FERNANDEZ      Address:      [id number] [place], [place] [id number]     Sex:Female     YOB: 1960     [phone]     Emergency Contact:URMILA FERNANDEZ     MRN:[id number]     FIN:[id number]     Location:[facility]     Date of Service:08/20/2020      Primary Care Physician:       Kristine Valadez MD, [phone]      Attending Physician:       Kristine Valadez MD, [phone]  Subjective      Today's visit was conducted via telemedicine, telephone, from clinic to patient in [place] due to the COVID-19 pandemic.       Patient consent, as follows, for telemedicine visit was obtained.   You have been scheduled for a telemedicine visit, which is a billable service.  This is a replacement for a face-to-face visit that is being recommended at this time to help keep our patient safe.  If, during our visit , we decide that you need a face-to-face visit, this visit will be canceled and you will be rescheduled to come into the clinic for a face to face appointment.  Can we proceed with a telemedicine visit?       HPI      cough and post nasal drip, no fever or chill, also + ce scleroderma pattern, + family history of lupus, will place referral to rheum, has knee surgery scheduled for Monday      ROS 10 point ROS is neg except as per HPI      Discussed:      Contact clinic if sx worsen or do not improve.       Also discussed methods to reduce risks of Covid-19 including social isolation and avoid touching face and frequent, adequate hand washing.  If you develop upper respiratory symptoms then call the clinic or the ED to discuss whether or not you should come in for further evaluation and treatment.  Objective   Lab Results        Lab Results (Last 4 results within 90 days)         Sodium Level: 139 mmol/L [135 mmol/L - 146 mmol/L] (07/30/20 09:05:00)        Potassium Level: 4.7 mmol/L [3.5 mmol/L - 5.3 mmol/L] (07/30/20  09:05:00)        Chloride Level: 103 mmol/L [98 mmol/L - 110 mmol/L] (07/30/20 09:05:00)        CO2 Level: 28 mmol/L [20 mmol/L - 32 mmol/L] (07/30/20 09:05:00)        Glucose Level: 97 mg/dL [65 mg/dL - 99 mg/dL] (07/30/20 09:05:00)        BUN: 17 mg/dL [7 mg/dL - 25 mg/dL] (07/30/20 09:05:00)        Creatinine Level: 0.94 mg/dL [0.5 mg/dL - 1.05 mg/dL] (07/30/20 09:05:00)        BUN/Creat Ratio: NOT APPLICABLE [6  - 22] (07/30/20 09:05:00)        eGFR: 66 mL/min/1.73m2 (07/30/20 09:05:00)        eGFR African American: 77 mL/min/1.73m2 (07/30/20 09:05:00)        Calcium Level: 9.9 mg/dL [8.6 mg/dL - 10.4 mg/dL] (07/30/20 09:05:00)        WBC: 5.9 [3.8  - 10.8] (07/30/20 09:05:00)        RBC: 4.74 [3.8  - 5.1] (07/30/20 09:05:00)        Hgb: 14.3 gm/dL [11.7 gm/dL - 15.5 gm/dL] (07/30/20 09:05:00)        Hct: 41.7 % [35 % - 45 %] (07/30/20 09:05:00)        MCV: 88 fL [80 fL - 100 fL] (07/30/20 09:05:00)        MCH: 30.2 pg [27 pg - 33 pg] (07/30/20 09:05:00)        MCHC: 34.3 gm/dL [32 gm/dL - 36 gm/dL] (07/30/20 09:05:00)        RDW: 12.3 % [11 % - 15 %] (07/30/20 09:05:00)        Platelet: 264 [140  - 400] (07/30/20 09:05:00)        MPV: 9.3 fL [7.5 fL - 12.5 fL] (07/30/20 09:05:00)        ROBERTO Scrn: POSITIVE Abnormal [NEGATIVE  - NEGATIVE] (07/30/20 09:05:00)        SM Antibody: <1.0 NEG (07/30/20 09:05:00)        Chromatin Ab: <1.0 NEG (07/30/20 09:05:00)        RNP Ab: <1.0 NEG (07/30/20 09:05:00)        SM/RNP Ab: <1.0 NEG (07/30/20 09:05:00)        dsDNA Ab: <1 (07/30/20 09:05:00)        SSA (Ro): <1.0 NEG (07/30/20 09:05:00)        SSB (La): <1.0 NEG (07/30/20 09:05:00)        Scleroderma (SCL-70) Ab: 2.4 POS Abnormal (07/30/20 09:05:00)        Dorota-1 Ab: <1.0 NEG (07/30/20 09:05:00)        Misc Test Interp: See comment (07/30/20 09:05:00)  Assessment/Plan       ROBERTO positive (R76.8)         Ordered:          [id number] physician telephone evaluation 11-20 min (Charge), Quantity: 1, Post-nasal drip  ROBERTO positive           Referral (Request), 08/20/20 8:26:00 CDT, Referred to: Rheumatology, ROBERTO positive                Post-nasal drip (R09.82)         Ordered:          [id number] physician telephone evaluation 11-20 min (Charge), Quantity: 1, Post-nasal drip  ROBERTO positive               pt will take antihistamine and we will see if that improves cough/runny nose/post nasal drip, if no will check CBC, she has surgery Monday, Covid test on Saturday      bruno also place referral to Rheum

## 2022-02-15 NOTE — NURSING NOTE
Comprehensive Intake Entered On:  8/20/2020 7:59 AM CDT    Performed On:  8/20/2020 7:56 AM CDT by Carlene Shabazz CMA               Summary   Chief Complaint :   f/u labs. Has surgery on monday, concerned as she has started to get a chest cold, is scheduled for COVID testing Saturday. Verbal consent given for video visit.    Menstrual Status :   Postmenopausal   Carlene Shabazz CMA - 8/20/2020 7:56 AM CDT   Health Status   Allergies Verified? :   Yes   Medication History Verified? :   Yes   Pre-Visit Planning Status :   N/A   Tobacco Use? :   Never smoker   Carlene Shabazz CMA - 8/20/2020 7:56 AM CDT   Consents   Consent for Immunization Exchange :   Consent Granted   Consent for Immunizations to Providers :   Consent Granted   Carlene Shabazz CMA - 8/20/2020 7:56 AM CDT   Meds / Allergies   (As Of: 8/20/2020 7:59:35 AM CDT)   Allergies (Active)   Nickel  Estimated Onset Date:   Unspecified ; Reactions:   Rash ; Created By:   Odalys García; Reaction Status:   Active ; Category:   Drug ; Substance:   Nickel ; Type:   Allergy ; Updated By:   Odalys García; Reviewed Date:   6/25/2020 2:09 PM CDT        Medication List   (As Of: 8/20/2020 7:59:35 AM CDT)   Prescription/Discharge Order    diclofenac topical  :   diclofenac topical ; Status:   Prescribed ; Ordered As Mnemonic:   Voltaren 1% topical gel ; Simple Display Line:   2-4 gm, Topical, qid, not to exceed 32 grams/day, 1 EA, 11 Refill(s) ; Ordering Provider:   Geneva Valadez MDica; Catalog Code:   diclofenac topical ; Order Dt/Tm:   6/18/2020 9:42:04 AM CDT            Home Meds    calcium citrate  :   calcium citrate ; Status:   Documented ; Ordered As Mnemonic:   calcium (as calcium citrate) 250 mg oral tablet ; Simple Display Line:   250 mg, 1 tab(s), Oral, daily, 0 Refill(s) ; Catalog Code:   calcium citrate ; Order Dt/Tm:   6/25/2020 2:14:00 PM CDT          cholecalciferol  :   cholecalciferol ; Status:   Documented ; Ordered As Mnemonic:   Vitamin D3 ; Simple Display  Line:   1 tab, Oral, daily, 0 Refill(s) ; Catalog Code:   cholecalciferol ; Order Dt/Tm:   6/25/2020 2:13:55 PM CDT          magnesium amino acids chelate  :   magnesium amino acids chelate ; Status:   Documented ; Ordered As Mnemonic:   magnesium amino acids chelate ; Simple Display Line:   1 tab, Oral, daily, 0 Refill(s) ; Catalog Code:   magnesium amino acids chelate ; Order Dt/Tm:   6/25/2020 2:12:08 PM CDT          multivitamin  :   multivitamin ; Status:   Documented ; Ordered As Mnemonic:   Multi Vitamin+ ; Simple Display Line:   1 tab daily, 0 Refill(s) ; Catalog Code:   multivitamin ; Order Dt/Tm:   8/29/2018 9:24:25 AM CDT          omega-3 polyunsaturated fatty acids  :   omega-3 polyunsaturated fatty acids ; Status:   Documented ; Ordered As Mnemonic:   Fish Oil 1000 mg oral capsule ; Simple Display Line:   1,000 mg, 1 cap(s), Oral, bid, 0 Refill(s) ; Catalog Code:   omega-3 polyunsaturated fatty acids ; Order Dt/Tm:   8/29/2018 9:24:08 AM CDT            ID Risk Screen   Recent Travel History :   No recent travel   Family Member Travel History :   No recent travel   Other Exposure to Infectious Disease :   Unknown   Calrene Shabazz CMA - 8/20/2020 7:56 AM CDT

## 2022-02-15 NOTE — LETTER
(Inserted Image. Unable to display)     March 08, 2021      SANDRA JIM  [id number] [place], [place] [id number]          Dear SANDRA,      Thank you for selecting [facility] (previously [facility]) for your healthcare needs.    Our records indicate you are due for the following services:     Fasting Lab Tests ~ Please do not eat or drink anything 10 hours prior to your scheduled appointment time.  (Water and any medications that you may need are allowed unless directed otherwise.)    If you had your labs done at another facility or with Direct Access Lab Testing at [facility], please bring in a copy of the results to your next visit, mail a copy, or drop off a copy of your results to your Healthcare Provider.    (FYI   Regarding office visits: In some instances, a video visit or telephone visit may be offered as an option.)      To schedule an appointment or if you have further questions, please contact your clinic at [phone].      Powered by [employer]    Sincerely,    Kristine Valadez MD

## 2022-02-15 NOTE — NURSING NOTE
Depression Screening Entered On:  2/22/2021 10:44 AM CST    Performed On:  2/19/2021 10:43 AM CST by Arely Dyson CMA               Depression Screening   Little Interest - Pleasure in Activities :   Not at all   Feeling Down, Depressed, Hopeless :   Not at all   Initial Depression Screen Score :   0 Score   Poor Appetite or Overeating :   Not at all   Trouble Falling or Staying Asleep :   Not at all   Feeling Tired or Little Energy :   Not at all   Feeling Bad About Yourself :   Not at all   Trouble Concentrating :   Not at all   Moving or Speaking Slowly :   Not at all   Thoughts Better Off Dead or Hurting Self :   Not at all   Detailed Depression Screen Score :   0    Total Depression Screen Score :   0    Arely Dyson CMA - 2/22/2021 10:43 AM CST

## 2022-02-15 NOTE — PROGRESS NOTES
Chief Complaint   arm pain and weakness and knee pain  History of Present Illness      patient present to clinic today for follow up      She reports that she is feeling trapped behind her  and encouraged her medications going had and let her surgery be done by someone that she feels uncomfortable with.  She would also like to review the results of her recent cervical x-ray.  She would like us to call her  today.      Can continue with the x-ray findings.  The putting onto her cell phone speaker and we reviewed the x-ray of the cervical spine showing evidence of osteophyte complexes.  Explained MRI including imaging.      2.  Find out if she is having any compression of her nerve roots or her spinal canal.  She reports that her left elbow is still quite painful she has arm weakness at times her wrist splints that she has been using for the past few days have significantly helped with her hand paresthesias but not the paresthesias that are higher on her arms.  We also talked frankly about how patient should absolutely feel comfortable with the person that she is trusting to do surgery on her.  There are lots of different doctors and lots of different patients and sometimes they make good connections and sometimes expect her to find a different connection.  This would likely require an office visit with a different surgeon to establish care with him right over that he would need any other imaging that was already done.      Review of systems 12 point review of systems negative except as per HPI      Exam:      General: alert and oriented ×3 no acute distress.      HEENT: pupils are equal round and reactive to light extraocular motion is intact. Normocephalic and atraumatic.       Hearing is grossly normal and there is no otorrhea. TM pearly grey with normal likght reflex      Nares are patent there is no rhinorrhea.       Mucous membranes are moist and pink.      Chest: has bilateral rise with no  increased work of breathing.  ctab no wheezes,  rhonchi or rales      Cardiovascular: normal perfusion and brisk capillary refill.  nml s1s2, no m/g/r      Musculoskeletal: no gross focal abnormalities and normal gait.  She continues to have left medial epicondyles tenderness at her left elbow.      Neuro: no gross focal abnormalities and memory seems intact.      Psychiatric: speech is clear and coherent and fluent. Patient dressed appropriately for the weather. Mood is appropriate and affect is full.                     Discussed with patient to return to clinic if symptoms worsen or do not improve  Physical Exam   Vitals & Measurements    T: 98.1   F (Tympanic)  HR: 72(Peripheral)  BP: 110/68   Assessment/Plan       Abnormal x-ray (R93.89)         Ordered:          74015 office outpatient visit 25 minutes (Charge), Quantity: 1, Abnormal x-ray  Weakness of both arms  Medial epicondylitis, left elbow  Left knee pain  Degenerative disc disease, cervical          MRI Cervical Spine w/ + w/o Contrast (Request), Instructions: CONTRAST PER RADIOLOGIST, Abnormal x-ray  Degenerative disc disease, cervical          Review Orders for Potential Authorizations, 06/25/20 14:46:47 CDT                Degenerative disc disease, cervical (M50.322)         Ordered:          50081 office outpatient visit 25 minutes (Charge), Quantity: 1, Abnormal x-ray  Weakness of both arms  Medial epicondylitis, left elbow  Left knee pain  Degenerative disc disease, cervical                Left knee pain (M25.562)         Ordered:          01722 office outpatient visit 25 minutes (Charge), Quantity: 1, Abnormal x-ray  Weakness of both arms  Medial epicondylitis, left elbow  Left knee pain  Degenerative disc disease, cervical                Medial epicondylitis, left elbow (M77.02)         Ordered:          38085 office outpatient visit 25 minutes (Charge), Quantity: 1, Abnormal x-ray  Weakness of both arms  Medial epicondylitis,  left elbow  Left knee pain  Degenerative disc disease, cervical                Weakness of both arms (R29.898)         Ordered:          08301 office outpatient visit 25 minutes (Charge), Quantity: 1, Abnormal x-ray  Weakness of both arms  Medial epicondylitis, left elbow  Left knee pain  Degenerative disc disease, cervical          XR Cervical Spine 3 Views (Request), Arm paresthesia, left  Arm pain, right  Arm weakness               Left knee pain status post total left knee arthroplasty now with clicking.  Encourage patient to not proceed with surgery until she feels totally comfortable with who she is planning to have see her physician.  #2 for.  Explained to patient the numerous different symptoms she is having an MRI with her also need to get EMG studies done which positive I also suspect that she has left medial epicondylitis so she was provided with a brace for this today.  She was also told that she likely has bilateral carpal tunnel syndrome.  Braces are enough to provide her adequate relief to be comfortable then we do not need to pursue any further work-up on this.  As she does plan to talk with another orthopedic surgeon we will hold off on doing a preop today as she does not think that she will have an opportunity to have the surgery done before the planning to leave town.  Patient Information     Name:SANDRA FERNANDEZ      Address:      [address] [id number]     Sex:Female     YOB: 1960     [phone]     Emergency Contact:URMILA FERNANDEZ     MRN:[id number]     FIN:[id number]     Location:[facility]     Date of Service:06/25/2020      Primary Care Physician:       NONE ,       Attending Physician:       Allyson CARROLL [facility], [phone]  Problem List/Past Medical History    Ongoing     ROBERTO positive     Family history of lupus erythematosus     Granuloma annulare     Hyperlipidemia     Obesity     Osteoarthritis of left knee      Ovarian mass     Prediabetes    Historical     Inpatient stay       Comments: @[facility], [place] - Primary osteoarthritis of left knee     Pregnancy     Pregnancy     Varicella       Comments: childhood  Procedure/Surgical History     Arthroplasty of knee (12/19/2018)      Comments: Left.     Injection of steroid (04/12/2018)      Comments: Left knee..     Excision (12/19/2017)      Comments: Mass in soft tissue of left upper arm..     Injection of steroid (12/14/2017)      Comments: Left knee..     Injection of steroid (07/27/2017)      Comments: Left knee..     Arthroscopy of knee (2014)      Comments: Left knee.. Partial medial meniscectomy..     Colonoscopy (2005)     LASIK     Surgical procedure      Comments: Vein surgery, left leg..  Medications    calcium (as calcium citrate) 250 mg oral tablet, 250 mg= 1 tab(s), Oral, daily    Fish Oil 1000 mg oral capsule, 1000 mg= 1 cap(s), Oral, bid    magnesium amino acids chelate, 1 tab, Oral, daily    Multi Vitamin+    Vitamin D3, 1 tab, Oral, daily    Voltaren 1% topical gel, 2-4 gm, Topical, qid, 11 refills  Allergies    Nickel (Rash)  Social History    Smoking Status - 06/25/2020     Never smoker     Alcohol      Never, 09/06/2018     Employment/School      Retired, Work/School description: CNA. Highest education level: [employer] 09/06/2019     Exercise      Exercise frequency: 3-4 times/week. Exercise type: Swimming, Yoga., 09/06/2018     Home/Environment      Marital status: . Spouse/Partner name: Nathan. Marcelle children. Living situation: Home/Independent. Injuries/Abuse/Neglect in household: No. Feels unsafe at home: No. Family/Friends available for support: Yes., 09/06/2019     Nutrition/Health      Diet: Low carbs. Type of diet: Low carbohydrate. Wants to lose weight: Yes. Sleeping concerns: No. Feels highly stressed: No., 09/06/2019     Sexual      Sexually active: Yes. Identifies as female, Sexual orientation: Straight or heterosexual.  History of STD: No. Uses condoms: Yes. History of sexual abuse: No., 09/06/2019     Substance Abuse      Never, 09/06/2018     Tobacco      Never (less than 100 in lifetime), 09/06/2018  Family History    Brain tumor: Aunt (P) (Dx at 55).    CABG - Coronary artery bypass graft: Father (Dx at 60).    COPD - Chronic obstructive pulmonary disease: Father.    Cancer: Aunt and Uncle.    Cancer in situ of lung: Aunt.    Diabetes mellitus type II: Mother and Father.    HA - Headache: Aunt.    Hyperlipidemia: Father.    Hypertension: Mother.    MI - Myocardial infarction: Father (Dx at 50).    Obese: Mother.    Stroke: Father.    Thyroid disease: Mother (Dx at 72).  Immunizations      Vaccine Date Status          influenza virus vaccine, inactivated 09/04/2019 Given          zoster vaccine, inactivated 09/04/2019 Given          influenza virus vaccine, inactivated 12/20/2018 Recorded          influenza virus vaccine, inactivated 10/13/2017 Recorded          influenza virus vaccine, inactivated 11/17/2014 Recorded          influenza virus vaccine, inactivated 09/18/2013 Recorded          influenza virus vaccine, inactivated 09/11/2012 Recorded          tetanus/diphth/pertuss (Tdap) adult/adol 09/11/2012 Recorded          Td 12/15/2005 Recorded          MMR (measles/mumps/rubella) 12/12/2005 Recorded          Hep B 09/03/1993 Recorded          Hep B 02/03/1993 Recorded          Hep B 01/05/1993 Recorded          MMR (measles/mumps/rubella) 02/01/1972 Recorded          OPV 01/01/1971 Recorded          OPV 03/01/1970 Recorded          OPV 01/01/1967 Recorded          OPV 11/01/1964 Recorded          OPV 09/01/1964 Recorded          Td 02/01/1963 Recorded          Td 11/03/1961 Recorded          Td 06/28/1961 Recorded          Td 03/30/1961 Recorded

## 2022-02-15 NOTE — PROGRESS NOTES
History of Present Illness      Today's visit was conducted via telemedicine, telephone, from clinic to patient in [place] due to the COVID-19 pandemic.       Patient consent, as follows, for telemedicine visit was obtained.   You have been scheduled for a telemedicine visit, which is a billable service.  This is a replacement for a face-to-face visit that is being recommended at this time to help keep our patient safe.  If, during our visit , we decide that you need a face-to-face visit, this visit will be canceled and you will be rescheduled to come into the clinic for a face to face appointment.  Can we proceed with a telemedicine visit? [phone]      HPI      Friday queasy, no fever, some chills, nausea, dry heaves x 1 anorexia, black BM, not gooey/tarry, headache yesterday, queasiness gone, has been taking a MV and on an empty stomach can cause some nausea, did take some pepto bismol      ROS 10 point ROS is neg except as per HPI      Discussed:      Contact clinic if sx worsen or do not improve.   Assessment/Plan       Dark red stool (R19.5)         Ordered:          45913 physician telephone evaluation 5-10 min (Charge), Quantity: 1, Nausea  Dark red stool                Nausea (R11.0)         Ordered:          [id number] physician telephone evaluation 5-10 min (Charge), Quantity: 1, Nausea  Dark red stool               suspect the dark stool was related to tthe pepto, does not sound like melena on description, cont to monitor Sx and RTC if sx worsen or do not improve.  Patient Information     Name:SANDRA FERNANDEZ      Address:      [address] [id number]     Sex:Female     YOB: 1960     [phone]     Emergency Contact:URMILA FERNANDEZ     MRN:[id number]     FIN:[id number]     Location:[facility]     Date of Service:11/23/2021      Primary Care Physician:       Kristine Valadez MD, [phone]      Attending Physician:       Kristine Valadez MD, [phone]  Problem List/Past Medical History    Ongoing     ROBERTO positive     Family history of lupus erythematosus     Granuloma annulare     History of 2019 novel coronavirus disease (COVID-19)     Hyperlipidemia     Obesity     Osteoarthritis of left knee     Ovarian mass     Prediabetes    Historical     Inpatient stay       Comments: @[facility], [place] - Primary osteoarthritis of left knee     Inpatient stay       Comments: [facility], [place] - Status post left knee revision with debridement.     Pregnancy     Pregnancy     Varicella       Comments: childhood  Procedure/Surgical History     Revision of left total knee arthroplasty (09/23/2020)     Arthroplasty of knee (12/19/2018)      Comments: Left.     Injection of steroid (04/12/2018)      Comments: Left knee..     Excision (12/19/2017)      Comments: Mass in soft tissue of left upper arm..     Injection of steroid (12/14/2017)      Comments: Left knee..     Injection of steroid (07/27/2017)      Comments: Left knee..     Arthroscopy of knee (2014)      Comments: Left knee.. Partial medial meniscectomy..     Colonoscopy (2005)     LASIK     Surgical procedure      Comments: Vein surgery, left leg..  Medications    calcium (as calcium citrate) 250 mg oral tablet, 250 mg= 1 tab(s), Oral, daily    Fish Oil 1000 mg oral capsule, 1000 mg= 1 cap(s), Oral, bid    magnesium amino acids chelate, 1 tab, Oral, daily    Multi Vitamin+    Vitamin D3, 1 tab, Oral, daily    Voltaren 1% topical gel, 2-4 gm, Topical, qid, 11 refills  Allergies    Nickel (Rash)  Social History    Smoking Status     Never smoker     Alcohol      Never     Electronic Cigarette/Vaping      Electronic Cigarette Use: Never.     Employment/School      Retired, Work/School description: CNA. Highest education level: [employer].     Exercise      Exercise frequency: 5-6 times/week.     Home/Environment      Marital status: . Spouse/Partner name: Nathan. 2 children. Living  situation: Home/Independent. Injuries/Abuse/Neglect in household: No. Feels unsafe at home: No. Family/Friends available for support: Yes.     Nutrition/Health      Type of diet: Calorie restricted. Wants to lose weight: Yes. Sleeping concerns: No. Feels highly stressed: No.     Sexual      Sexually active: Yes. Identifies as female, Sexual orientation: Straight or heterosexual. History of STD: No. Uses condoms: Yes. History of sexual abuse: No.     Substance Abuse      Never     Tobacco      Never (less than 100 in lifetime)  Family History    Autoimmune disease: Sister.    Brain tumor: Aunt (P) (Dx at 55).    CABG - Coronary artery bypass graft: Father (Dx at 60).    COPD - Chronic obstructive pulmonary disease: Father.    Cancer: Aunt and Uncle.    Cancer in situ of lung: Aunt.    Diabetes mellitus type II: Mother and Father.    HA - Headache: Aunt.    Hyperlipidemia: Father.    Hypertension: Mother.    MI - Myocardial infarction: Father (Dx at 50).    Obese: Mother.    Stroke: Father.    Thyroid disease: Mother (Dx at 72).  Immunizations       Scheduled Immunizations       Dose Date(s)       influenza virus vaccine, inactivated       09/11/2012, 12/20/2018, 10/10/2020       Td       03/30/1961, 06[birth year]28/1961, 11/03/1961, 02/01/1963       Other Immunizations               Hep B       01/05/1993, 02/03/1993, 09/03/1993       MMR (measles/mumps/rubella)       02/01/1972, 12/12/2005       OPV       09/01/1964, 11/01/1964, 01/01/1967, 03/01/1970, 01/01/1971       influenza virus vaccine, inactivated       09/18/2013, 11/17/2014, 10/13/2017, 09/04/2019       Td       12/15/2005       tetanus/diphth/pertuss (Tdap) adult/adol       09/11/2012       zoster vaccine, inactivated       09/04/2019, 02/19/2021       SARS-CoV-2 (COVID-19) Moderna-1273       04/21/2021, 05/19/2021

## 2022-02-15 NOTE — PROGRESS NOTES
Chief Complaint    Patient presents to establish care and discuss arthritis.  History of Present Illness      Pt here today for annual exam      pt has history of osteoarthritis  in the knee and now has knee pain.      Review of systems is negative except as per HPI including no fevers, chills, sore throat, runny nose, nausea, vomiting, constipation, diarrhea, rash or new skin lesions, chest pain, palpitations, slurred speech, new paresthesia, shortness of breath or wheeze.             Exam:      see vitals listed below      General: alert and oriented ×3 no acute distress.      HEENT: Normocephalic and atraumatic.       Eyes pupils are equal round and reactive to light extraocular motion is intact. normal conjunctiva      Hearing is grossly normal and there is no otorrhea. Tympanic membranes are pearly grey with a normal light reflex.      Nares are patent there is no rhinorrhea.       Mucous membranes are moist and pink.      Chest: has bilateral rise with no increased work of breathing. clear to auscultation without wheezes, rhonchi, or rales.      Cardiovascular: normal perfusion and brisk capillary refill. S1S2 with regular rate and rhythm and no murmurs, gallops or rubs.      Musculoskeletal: no gross focal abnormalities and normal gait.  left medial knee ttp no effusin or redness      Neuro: no gross focal abnormalities and memory seems intact.  CN 2-12 are grossly intact.      Psychiatric: speech is clear and coherent and fluent. Patient dressed appropriately for the weather. Mood is appropriate and affect is full.      Abd: no rebound or guarding, normal BS      Skin: no rash or lesions identified      Discussed:      using sunscreen, protecting from sunburn,      taking folic acid 400 mcg daily      refer to usdamyhealthyplate.gov, AHA and ADA for diet and exercise recommendations      consume [id number] mg calcium daily      std screening      regular self skin checks                   .             Procedure note            Informed consent obtained including risks of pain, bleeding, infection, injury to surrounding tissue, worsening of condition, and need for further treatment.   Steroids can cause increased blood sugars, a steroid induced psychosis, irritability, insomnia, and increased appetite.      Benefit/goal of a knee steroid  injection is to reduce pain.  I cannot guarantee that will will be any pain relief.            Alternatives would include doing nothing, physical therapy, acupuncture, using heat or ice, continuing with oral medications such as tylenol or  nonsteroidal anti-inflammatory medications or topical medications such as a lidocaine patch or cream or menthol or diclofenac., or referral to another physician or Orthopedic Surgeon             Prep: Betadine and  Alcohol           Location identified by my palpation and communication with patient.  Left knee injected using a lateral approach                        This was injected with  2ml of 1% lidocaine without epinephrine and 2 ml of 0.5% Marcaine without epinephrine with 1 ml of 40mg/1ml Kenalog.                      pain prior to treatment: severe           pain following treatment at the trigger point injection sites:minimal to moderate           Complications: none           Tolerated procedure well.           Assessment and Plan:  Symptomatic Osteoarthritis of the Left Knee.  Symptoms improved with injection today.  Patient aware the numbing medicine may wear off tonight and it may take up to 2 weeks for the full effects of the steroid to have maximum benefit.   Rest the knee and take it easy for the next 72 hours.  Ok to shower tomorrow but not to bathe or swim for the next few days.  RTC if symptoms worsen or do not improve.   May follow up with PCP prn.  Physical Exam   Vitals & Measurements    T: 98.3   F (Tympanic)  HR: 80(Peripheral)  BP: 108/70  SpO2: 96%     HT: 65.75 in  WT: 186.6 lb  BMI: 30.34   Assessment/Plan       Knee  pain (M25.569)        will ask pt to get records from ortho for          Orders:          Referral (Request), 08/29/18 13:19:00 CDT, Referred to: Orthopaedics, Knee pain  Osteoarthritis of left knee          XR Knee Complete Left (Request), Osteoarthritis of left knee  Knee pain                Lipid screening (Z13.220)         Orders:          Lipid panel with reflex to direct ldl* ([facility]), Specimen Type: Serum, Collection Date: 08/30/18 7:00:00 CDT                Osteoarthritis of left knee (M17.12)         Orders:          Referral (Request), 08/29/18 13:56:00 CDT, Referred to: Physical Therapy, Reason for referral: Treat left knee pain, Osteoarthritis of left knee          Referral (Request), 08/29/18 13:19:00 CDT, Referred to: Orthopaedics, Knee pain  Osteoarthritis of left knee          XR Knee Complete Left (Request), Osteoarthritis of left knee  Knee pain                Screening for diabetes mellitus (DM) (Z13.1)         Orders:          Basic Metabolic Panel* ([facility]), Specimen Type: Serum, Collection Date: 08/30/18 7:00:00 CDT          Hemoglobin A1c* ([facility]), Specimen Type: Blood, Collection Date: 08/30/18 7:00:00 CDT                Well adult exam (Z00.00)         Orders:          Basic Metabolic Panel* ([facility]), Specimen Type: Serum, Collection Date: 08/30/18 7:00:00 CDT          Hemoglobin A1c* ([facility]), Specimen Type: Blood, Collection Date: 08/30/18 7:00:00 CDT          Lipid panel with reflex to direct ldl* ([facility]), Specimen Type: Serum, Collection Date: 08/30/18 7:00:00 CDT          Return to Clinic (Request), RFV: needs fasting BMP, hgba1c and lipid panel for screening               40minutes spent with patient in direct face to face contact, in addition to the time spent performing the procedure today, greater than 50% of that time was  spent counseling and coordinating care.   Patient Information     Name:SANDRA FERNANDEZ      Address:      [id number], [place] [address]      Sex:Female     YOB: 1960     [phone]     Emergency Contact:URMILA FERNANDEZ     MRN:[id number]     FIN:[id number]     Location[facility]     Date of Service:08/29/2018      Primary Care Physician:       DORIS       Attending Physician:       Kristine Valadez MD, [phone]  Problem List/Past Medical History    Ongoing     Obesity     Osteoarthritis of left knee     Ovarian mass    Historical     Varicella       Comments: childhood  Procedure/Surgical History     Injection of steroid (04/12/2018)            Comments:      Left knee.     Excision (12/19/2017)            Comments:      Mass in soft tissue of left upper arm.     Injection of steroid (12/14/2017)            Comments:      Left knee.     Injection of steroid (07/27/2017)            Comments:      Left knee.     Arthroscopy of knee (2014)            Comments:      Left knee.      Partial medial meniscectomy.     Colonoscopy (2005)           LASIK           Surgical procedure            Comments:      Vein surgery, left leg.  Medications     Restasis 0.05% ophthalmic emulsion: 1 drop(s), Eye-Both, q12 hrs, 0 Refill(s).     meloxicam 15 mg oral tablet: 15 mg, 1 tab(s), Oral, daily, 0 Refill(s).     Vitamin C: 500 mg, daily, 0 Refill(s).     Multi Vitamin+: 1 tab daily, 0 Refill(s).     acetaminophen-codeine #3: Oral, q4 hrs, PRN: as needed for pain, 0 Refill(s).     Lexapro 10 mg oral tablet: 10 mg, 1 tab(s), Oral, daily, 0 Refill(s).     Flax Seed Oil: 1,000 mg cap daily, 0 Refill(s).     Fish Oil 1000 mg oral capsule: 1,000 mg, 1 cap(s), Oral, bid, 0 Refill(s).          Allergies    Nickel (Rash)  Social History    Smoking Status - 08/29/2018     Never smoker     Alcohol - Denies Alcohol Use, 08/17/2018     Substance Abuse - Denies Substance Abuse, 08/17/2018     Tobacco - Denies Tobacco Use, 08/17/2018  Family History    Brain tumor: Aunt (P) (Dx at 55).    CABG - Coronary artery bypass graft: Father (Dx  at 60).    COPD - Chronic obstructive pulmonary disease: Father.    Diabetes mellitus type II: Mother and Father.    Hyperlipidemia: Father.    Hypertension: Mother.    MI - Myocardial infarction: Father (Dx at 50).    Stroke: Father.    Thyroid disease: Mother (Dx at 72).  Immunizations      Vaccine Date Status      influenza virus vaccine, inactivated 10/13/2017 Recorded      influenza virus vaccine, inactivated 11/17/2014 Recorded      influenza virus vaccine, inactivated 09/18/2013 Recorded      tetanus/diphth/pertuss (Tdap) adult/adol 09/11/2012 Recorded      Td 12/15/2005 Recorded      MMR (measles/mumps/rubella) 12/12/2005 Recorded      Hep B 09/03/1993 Recorded      Hep B 02/03/1993 Recorded      Hep B 01/05/1993 Recorded      MMR (measles/mumps/rubella) 02/01/1972 Recorded      OPV 01/01/1971 Recorded      OPV 03/01/1970 Recorded      OPV 01/01/1967 Recorded      OPV 11/01/1964 Recorded      OPV 09/01/1964 Recorded

## 2022-02-15 NOTE — LETTER
(Inserted Image. Unable to display)   June 22, 2021    SANDRA JIM  [id number] [place] [address]            Dear SANDRA,      Thank you for selecting [facility] for your healthcare needs.    Our records indicate you are due for the following services:     Immunizations:  Shingrix.    (FYI   Regarding office visits: In some instances, a video visit or telephone visit may be offered as an option.)      To schedule an appointment or if you have further questions, please contact your clinic at [phone].      Powered by [employer]ide[employer]    Sincerely,    Kristine Valadez MD

## 2022-02-15 NOTE — TELEPHONE ENCOUNTER
---------------------  From: Derek PERES, Kavya (Phone Messages Pool ([id number]_[facility]))   To: JIM SANDRA J    Sent: 4/1/2021 6:29:46 PM CDT  Subject: RE: High cholesterol     Katty York,    The labs done on 3/31/21 did include a cardiolipin antibody for Dr. Luu. The result will be sent to him when it is available. Many people are on cholesterol medication long term due to their cholesterol levels and risk factors. Good work on your weight loss and exercise regime! I will send your message to Dr. Valadez. I see she sent you a message earlier today.    Thank you,    Kavya MEYER RN            ---------------------  From: SANDRA FERNANDEZ  To: [facility]  Sent: 04/01/2021 05:43 p.m. CDT  Subject: High cholesterol  Hi.  For the last month I have been a member of the [employer]. I am swimming, doing some weights and yoga 5-6x a week. I have dropped 5 pounds and now that the weather is nice we are walking in the evenings.  We have eliminated many carbs in our food Choices too.  If I start a Statin is this something I can get off. Both my parents I believe had high cholesterol at least my dad did for sure. He had Many heart problems but he was a smoker.  Also I was to have gotten additional blood work done for dr. Joyner (arthritic) did they collect that and was the results sent to him?  Something was Inconclusive. (Cardiac??)so he wanted it recheck in 3-4 months.  Did lab draw for this?  Andrzejmarbella York.

## 2022-02-15 NOTE — NURSING NOTE
Hearing and Vision Screening Entered On:  9/4/2019 3:26 PM CDT    Performed On:  9/4/2019 3:26 PM CDT by Carlene Gaviria CMA               Hearing and Vision Screening   Audiogram Result Right Ear :   Pass   Audiogram Result Left Ear :   Fail   Carlene Gaviria CMA - 9/4/2019 3:26 PM CDT

## 2022-02-15 NOTE — LETTER
(Inserted Image. Unable to display) August 10, 2020Re: SANDRA JACINTOULEDOB:  1960 [facility] [address]To:  [facility] HospitalThe following patient has been referred to your office/practice: SANDRA FERNANDEZ  Appointment 8/17/2020 at 8:10am with Dr. Stewart Grant.Please refer to the attached clinical documentation for a summary of SANDRA's care.  Please do not hesitate to contact our office if any additional clinical questions arise.  All relevant records and transition of care documents should be mailed or faxed.  Your assistance in providing continuity of care is appreciated.Sincerely, [facility] & [place] [address] [place] [address](P) [phone](F) [phone]

## 2022-02-15 NOTE — NURSING NOTE
Comprehensive Intake Entered On:  9/4/2019 2:56 PM CDT    Performed On:  9/4/2019 2:51 PM CDT by Carlene Gaviria CMA               Summary   Chief Complaint :   Physical    Menstrual Status :   Postmenopausal   Weight Measured :   175.4 lb(Converted to: 175 lb 6 oz, 79.56 kg)    Height Measured :   65 in(Converted to: 5 ft 5 in, 165.10 cm)    Body Mass Index :   29.18 kg/m2 (HI)    Body Surface Area :   1.91 m2   Systolic Blood Pressure :   122 mmHg   Diastolic Blood Pressure :   68 mmHg   Mean Arterial Pressure :   86 mmHg   Peripheral Pulse Rate :   74 bpm   BP Site :   Right arm   BP Method :   Manual   HR Method :   Electronic   Oxygen Saturation :   98 %   Carlene Gaviria CMA - 9/4/2019 2:51 PM CDT   Health Status   Allergies Verified? :   Yes   Medication History Verified? :   Yes   Pre-Visit Planning Status :   Completed   Tobacco Use? :   Never smoker   Carlene Gaviria CMA - 9/4/2019 2:51 PM CDT   Consents   Consent for Immunization Exchange :   Consent Granted   Consent for Immunizations to Providers :   Consent Granted   Carlene Gaviria CMA - 9/4/2019 2:51 PM CDT   Meds / Allergies   (As Of: 9/4/2019 2:56:28 PM CDT)   Allergies (Active)   Nickel  Estimated Onset Date:   Unspecified ; Reactions:   Rash ; Created By:   Odalys García; Reaction Status:   Active ; Category:   Drug ; Substance:   Nickel ; Type:   Allergy ; Updated By:   Odalys García; Reviewed Date:   8/30/2018 9:51 AM CDT        Medication List   (As Of: 9/4/2019 2:56:28 PM CDT)   Home Meds    acetaminophen-codeine  :   acetaminophen-codeine ; Status:   Documented ; Ordered As Mnemonic:   acetaminophen-codeine #3 ; Simple Display Line:   Oral, q4 hrs, PRN: as needed for pain, 0 Refill(s) ; Catalog Code:   acetaminophen-codeine ; Order Dt/Tm:   8/29/2018 9:22:58 AM          acetaminophen  :   acetaminophen ; Status:   Documented ; Ordered As Mnemonic:   acetaminophen 500 mg oral tablet ; Simple Display Line:   1,000 mg, 2 tab(s), Oral, q6 hrs, Per [facility]  12/21/2018, 0 Refill(s) ; Catalog Code:   acetaminophen ; Order Dt/Tm:   12/26/2018 11:14:09 AM          ascorbic acid  :   ascorbic acid ; Status:   Documented ; Ordered As Mnemonic:   Vitamin C ; Simple Display Line:   500 mg, daily, 0 Refill(s) ; Catalog Code:   ascorbic acid ; Order Dt/Tm:   8/29/2018 9:23:40 AM          aspirin  :   aspirin ; Status:   Documented ; Ordered As Mnemonic:   aspirin 325 mg oral tablet ; Simple Display Line:   325 mg, 1 tab(s), Oral, daily, Per [facility] 12/21/2018, 0 Refill(s) ; Catalog Code:   aspirin ; Order Dt/Tm:   12/26/2018 11:14:45 AM          docusate-senna  :   docusate-senna ; Status:   Documented ; Ordered As Mnemonic:   Senna S 50 mg-8.6 mg oral tablet ; Simple Display Line:   2 tab(s), Oral, bid, Per [facility] 12/21/2018: take1-3   tabs 2 x qd, 0 Refill(s) ; Catalog Code:   docusate-senna ; Order Dt/Tm:   12/26/2018 11:16:39 AM          escitalopram  :   escitalopram ; Status:   Documented ; Ordered As Mnemonic:   Lexapro 10 mg oral tablet ; Simple Display Line:   10 mg, 1 tab(s), Oral, daily, 0 Refill(s) ; Catalog Code:   escitalopram ; Order Dt/Tm:   8/29/2018 9:23:19 AM          flax  :   flax ; Status:   Documented ; Ordered As Mnemonic:   Flax Seed Oil ; Simple Display Line:   1,000 mg cap daily, 0 Refill(s) ; Catalog Code:   flax ; Order Dt/Tm:   8/29/2018 9:23:56 AM          hydrOXYzine  :   hydrOXYzine ; Status:   Documented ; Ordered As Mnemonic:   hydrOXYzine pamoate 25 mg oral capsule ; Simple Display Line:   25 mg, 1 cap(s), Oral, q6 hrs, Per [facility] 12/21/2018, 0 Refill(s) ; Catalog Code:   hydrOXYzine ; Order Dt/Tm:   12/26/2018 11:15:28 AM          multivitamin  :   multivitamin ; Status:   Documented ; Ordered As Mnemonic:   Multi Vitamin+ ; Simple Display Line:   1 tab daily, 0 Refill(s) ; Catalog Code:   multivitamin ; Order Dt/Tm:   8/29/2018 9:24:25 AM          omega-3 polyunsaturated fatty acids  :   omega-3 polyunsaturated fatty acids ; Status:    Documented ; Ordered As Mnemonic:   Fish Oil 1000 mg oral capsule ; Simple Display Line:   1,000 mg, 1 cap(s), Oral, bid, 0 Refill(s) ; Catalog Code:   omega-3 polyunsaturated fatty acids ; Order Dt/Tm:   8/29/2018 9:24:08 AM          oxyCODONE  :   oxyCODONE ; Status:   Documented ; Ordered As Mnemonic:   oxyCODONE 5 mg oral tablet ; Simple Display Line:   10 mg, 2 tab(s), Oral, q4 hrs, Per [facility] 12/21/2018, 0 Refill(s) ; Catalog Code:   oxyCODONE ; Order Dt/Tm:   12/26/2018 11:16:07 AM

## 2022-02-15 NOTE — TELEPHONE ENCOUNTER
---------------------  From: Kristine Valadez MD   To: SANDRA FERNANDEZ    Sent: 4/1/2021 5:04:27 PM CDT  Subject: General Message       Your diabetes screening looks great.  Your cholesterol is higher than it should be.  Please take a look at the American Heart Association's website for some information on dietary and lifestyle changes your can do to improve your cholesterol.    Your 10 year ASCVD risk is low at 2.7% so I do not recommend starting a statin at this time.    *AHA/ACC guidelines stress the importance of lifestyle modifications to lower cardiovascular disease risk in all patients. This includes eating a heart-healthy diet, regular aerobic exercises, maintenance of desirable body weight and avoidance of tobacco products.     Results:  Date Result Name Ind Value Ref Range   3/31/2021 10:48 AM Sodium Level  135 mmol/L (135 - 146)   3/31/2021 10:48 AM Potassium Level  4.3 mmol/L (3.5 - 5.3)   3/31/2021 10:48 AM Chloride Level  101 mmol/L (98 - 110)   3/31/2021 10:48 AM CO2 Level  27 mmol/L (20 - 32)   3/31/2021 10:48 AM Glucose Level  88 mg/dL (65 - 99)   3/31/2021 10:48 AM BUN  18 mg/dL (7 - 25)   3/31/2021 10:48 AM Creatinine Level  0.98 mg/dL (0.50 - 0.99)   3/31/2021 10:48 AM BUN/Creat Ratio  NOT APPLICABLE (6 - 22)   3/31/2021 10:48 AM eGFR  63 mL/min/1.73m2 (> OR = 60 - )   3/31/2021 10:48 AM eGFR African American  73 mL/min/1.73m2 (> OR = 60 - )   3/31/2021 10:48 AM Calcium Level  9.8 mg/dL (8.6 - 10.4)   3/31/2021 10:48 AM Hgb A1c  4.7 ( - <5.7)   3/31/2021 10:48 AM Cholesterol ((H)) 263 mg/dL ( - <200)   3/31/2021 10:48 AM Non-HDL Cholesterol ((H)) 208 ( - <130)   3/31/2021 10:48 AM HDL  55 mg/dL (> OR = 50 - )   3/31/2021 10:48 AM Cholesterol/HDL Ratio  4.8 ( - <5.0)   3/31/2021 10:48 AM LDL ((H)) 178    3/31/2021 10:48 AM Triglyceride ((H)) 152 mg/dL ( - <150)

## 2022-02-15 NOTE — TELEPHONE ENCOUNTER
---------------------  From: Aracelis Jean-Baptiste LPN (Phone Messages Pool ([id number]_[facility]))   To: [facility] Message Pool ([id number]_[place]);     Sent: 4/2/2021 7:59:24 AM CDT  Subject: CONSUMER MESSAGE FW: High cholesterol           ---------------------  From: SANDRA FERNANDEZ  To: [facility]  Sent: 04/01/2021 06:36 p.m. CDT  Subject: RE: High cholesterol  Ok. If she puts a prescription order in. My pharmacy is [facility] in [facility].  ---------------------  From: Kavya Reed RN (Phone Messages Pool ([id number]_[facility]))  To: SANDRA FERNANDEZ  Sent: 4/1/2021 6:29:46 PM CDT  Subject: RE: High cholesterol       Katty York,       The labs done on 3/31/21 did include a cardiolipin antibody for Dr. Luu. The result will be sent to him when it is available. Many people are on cholesterol medication long term due to their cholesterol levels and risk factors. Good work on your weight loss and exercise regime! I will send your message to Dr. Valadez. I see she sent you a message earlier today.       Thank you,       Kavya MEYER RN                           ---------------------  From: SANDRA FERNANDEZ  To: [facility]  Sent: 04/01/2021 05:43 p.m. CDT  Subject: High cholesterol  Hi.  For the last month I have been a member of the [email or web address]. I am swimming, doing some weights and yoga 5-6x a week. I have dropped 5 pounds and now that the weather is nice we are walking in the evenings.  We have eliminated many carbs in our food Choices too.  If I start a Statin is this something I can get off. Both my parents I believe had high cholesterol at least my dad did for sure. He had Many heart problems but he was a smoker.  Also I was to have gotten additional blood work done for dr. Joyner (arthritic) did they collect that and was the results sent to him?  Something was Inconclusive. (Cardiac??)so he wanted it recheck in 3-4 months.  Did lab draw for this?  Thx  Chela.---------------------  From: Lou Sánchez ([facility] Message Pool ([id number]_[facility]))   To: Kristine Valadez MD;     Sent: 4/2/2021 8:30:39 AM CDT  Subject: FW: CONSUMER MESSAGE FW: High cholesterol

## 2022-02-15 NOTE — PROGRESS NOTES
Chief Complaint    Pre-op. DOS 8/24/2020- Left knee, [facility]- Dr. Montoya.  History of Present Illness      patient present to clinic today for Preop,      She has report of some joint stiffness, including knee and hands, hxo f + ce, and family hx of autoimmune disease.  Her EKG shows a slightly prolonged qrs, see EKG note.      no personal or family history of coagulopathy or problems with anesthesia      Review of systems 12 point review of systems negative except as per HPI      Exam:      General: alert and oriented ×3 no acute distress.      HEENT: pupils are equal round and reactive to light extraocular motion is intact. Normocephalic and atraumatic.       Hearing is grossly normal and there is no otorrhea. TM pearly grey with normal light reflex      Nares are patent there is no rhinorrhea.       Mucous membranes are moist and pink.      Chest: has bilateral rise with no increased work of breathing.  ctab no wheezes,  rhonchi or rales      Cardiovascular: normal perfusion and brisk capillary refill.  nml s1s2, no m/g/r      Musculoskeletal: no gross focal abnormalities and normal gait.      Neuro: no gross focal abnormalities and memory seems intact.      Psychiatric: speech is clear and coherent and fluent. Patient dressed appropriately for the weather. Mood is appropriate and affect is full.                                   Physical Exam   Vitals & Measurements    T: 97.2  F (Tympanic)  HR: 71 (Peripheral)  BP: 118/80  SpO2: 98%     WT: 179.8 lb   Assessment/Plan       CE positive (R76.8)       Intraventricular conduction delay (I45.9)       Left knee pain (M25.562)       Preop testing (Z01.818)      Pt's surgery is scheduled for the end of August so we have time to get Card consult, will also send CE multiplex and CBC and BMP.  Patient Information     Name:SANDRA FERNANDEZ      Address:      [id number] [place] [id number]     Sex:Female     YOB: 1960     [phone]     Emergency Contact:URMILA FERNANDEZ     MRN:[id number]     FIN:[id number]     Location:[facility]     Date of Service:07/30/2020      Primary Care Physician:       Kristine Valadez MD, [phone]      Attending Physician:       Kristine Valadez MD, [phone]  Problem List/Past Medical History    Ongoing     ROBERTO positive     Family history of lupus erythematosus     Granuloma annulare     Hyperlipidemia     Obesity     Osteoarthritis of left knee     Ovarian mass     Prediabetes    Historical     Inpatient stay       Comments: @[facility], [place] - Primary osteoarthritis of left knee     Pregnancy     Pregnancy     Varicella       Comments: childhood  Procedure/Surgical History     Arthroplasty of knee (12/19/2018)      Comments: Left.     Injection of steroid (04/12/2018)      Comments: Left knee..     Excision (12/19/2017)      Comments: Mass in soft tissue of left upper arm..     Injection of steroid (12/14/2017)      Comments: Left knee..     Injection of steroid (07/27/2017)      Comments: Left knee..     Arthroscopy of knee (2014)      Comments: Left knee.. Partial medial meniscectomy..     Colonoscopy (2005)     LASIK     Surgical procedure      Comments: Vein surgery, left leg..  Medications    calcium (as calcium citrate) 250 mg oral tablet, 250 mg= 1 tab(s), Oral, daily    Fish Oil 1000 mg oral capsule, 1000 mg= 1 cap(s), Oral, bid    magnesium amino acids chelate, 1 tab, Oral, daily    Multi Vitamin+    Vitamin D3, 1 tab, Oral, daily    Voltaren 1% topical gel, 2-4 gm, Topical, qid, 11 refills  Allergies    Nickel (Rash)  Social History    Smoking Status     Never smoker     Alcohol      Never     Employment/School      Retired, Work/School description: CNA. Highest education level: [employer].     Exercise      Exercise frequency: 3-4 times/week. Exercise type: Swimming, Yoga.     Home/Environment      Marital status: . Spouse/Partner name: Ervin  2 children. Living situation: Home/Independent. Injuries/Abuse/Neglect in household: No. Feels unsafe at home: No. Family/Friends available for support: Yes.     Nutrition/Health      Diet: Low carbs. Type of diet: Low carbohydrate. Wants to lose weight: Yes. Sleeping concerns: No. Feels highly stressed: No.     Sexual      Sexually active: Yes. Identifies as female, Sexual orientation: Straight or heterosexual. History of STD: No. Uses condoms: Yes. History of sexual abuse: No.     Substance Abuse      Never     Tobacco      Never (less than 100 in lifetime)  Family History    Brain tumor: Aunt (P) (Dx at 55).    CABG - Coronary artery bypass graft: Father (Dx at 60).    COPD - Chronic obstructive pulmonary disease: Father.    Cancer: Aunt and Uncle.    Cancer in situ of lung: Aunt.    Diabetes mellitus type II: Mother and Father.    HA - Headache: Aunt.    Hyperlipidemia: Father.    Hypertension: Mother.    MI - Myocardial infarction: Father (Dx at 50).    Obese: Mother.    Stroke: Father.    Thyroid disease: Mother (Dx at 72).  Immunizations      Vaccine Date Status          influenza virus vaccine, inactivated 09/04/2019 Given          zoster vaccine, inactivated 09/04/2019 Given          influenza virus vaccine, inactivated 12/20/2018 Recorded          influenza virus vaccine, inactivated 10/13/2017 Recorded          influenza virus vaccine, inactivated 11/17/2014 Recorded          influenza virus vaccine, inactivated 09/18/2013 Recorded          influenza virus vaccine, inactivated 09/11/2012 Recorded          tetanus/diphth/pertuss (Tdap) adult/adol 09/11/2012 Recorded          Td 12/15/2005 Recorded          MMR (measles/mumps/rubella) 12/12/2005 Recorded          Hep B 09/03/1993 Recorded          Hep B 02/03/1993 Recorded          Hep B 01/05/1993 Recorded          MMR (measles/mumps/rubella) 02/01/1972 Recorded          OPV 01/01/1971 Recorded          OPV 03/01/1970 Recorded          OPV 01/01/1967  Recorded          OPV 11/01/1964 Recorded          OPV 09/01/1964 Recorded          Td 02/01/1963 Recorded          Td 11/03/1961 Recorded          Td 06/28/1961 Recorded          Td 03/30/1961 Recorded  Lab Results       Lab Results (Last 4 results within 90 days)        Sodium Level: 139 mmol/L [135 mmol/L - 146 mmol/L] (07/30/20 09:05:00)       Potassium Level: 4.7 mmol/L [3.5 mmol/L - 5.3 mmol/L] (07/30/20 09:05:00)       Chloride Level: 103 mmol/L [98 mmol/L - 110 mmol/L] (07/30/20 09:05:00)       CO2 Level: 28 mmol/L [20 mmol/L - 32 mmol/L] (07/30/20 09:05:00)       Glucose Level: 97 mg/dL [65 mg/dL - 99 mg/dL] (07/30/20 09:05:00)       BUN: 17 mg/dL [7 mg/dL - 25 mg/dL] (07/30/20 09:05:00)       Creatinine Level: 0.94 mg/dL [0.5 mg/dL - 1.05 mg/dL] (07/30/20 09:05:00)       BUN/Creat Ratio: NOT APPLICABLE [6  - 22] (07/30/20 09:05:00)       eGFR: 66 mL/min/1.73m2 (07/30/20 09:05:00)       eGFR : 77 mL/min/1.73m2 (07/30/20 09:05:00)       Calcium Level: 9.9 mg/dL [8.6 mg/dL - 10.4 mg/dL] (07/30/20 09:05:00)       WBC: 5.9 [3.8  - 10.8] (07/30/20 09:05:00)       RBC: 4.74 [3.8  - 5.1] (07/30/20 09:05:00)       Hgb: 14.3 gm/dL [11.7 gm/dL - 15.5 gm/dL] (07/30/20 09:05:00)       Hct: 41.7 % [35 % - 45 %] (07/30/20 09:05:00)       MCV: 88 fL [80 fL - 100 fL] (07/30/20 09:05:00)       MCH: 30.2 pg [27 pg - 33 pg] (07/30/20 09:05:00)       MCHC: 34.3 gm/dL [32 gm/dL - 36 gm/dL] (07/30/20 09:05:00)       RDW: 12.3 % [11 % - 15 %] (07/30/20 09:05:00)       Platelet: 264 [140  - 400] (07/30/20 09:05:00)       MPV: 9.3 fL [7.5 fL - 12.5 fL] (07/30/20 09:05:00)       ROBERTO Scrn: POSITIVE Abnormal [NEGATIVE  - NEGATIVE] (07/30/20 09:05:00)       SM Antibody: <1.0 NEG (07/30/20 09:05:00)       Chromatin Ab: <1.0 NEG (07/30/20 09:05:00)       RNP Ab: <1.0 NEG (07/30/20 09:05:00)       SM/RNP Ab: <1.0 NEG (07/30/20 09:05:00)       dsDNA Ab: <1 (07/30/20 09:05:00)       SSA (Ro): <1.0 NEG (07/30/20 09:05:00)        SSB (La): <1.0 NEG (07/30/20 09:05:00)       Scleroderma (SCL-70) Ab: 2.4 POS Abnormal (07/30/20 09:05:00)       Dorota-1 Ab: <1.0 NEG (07/30/20 09:05:00)       Misc Test Interp: See comment (07/30/20 09:05:00)  received consult from [facility], she has cardiac clearance for  her surgery

## 2022-02-15 NOTE — TELEPHONE ENCOUNTER
---------------------  From: Kristine Valadez MD   To: [facility] Message Pool ([id number]_[place]);     Sent: 7/30/2020 1:39:49 PM CDT  Subject: General Message     please let patient know that the conduction delay is minimal, depending on which reference source I look at it might even be at the upper limits of normal, but with her family history and upcoming surgery I'd love for her to meet with a CardiologistContacted patient at 1341- she agreed to see cardiology. She will await call from referrals.---------------------  From: Carlene Shabazz CMA ([facility] Message Pool ([id number]_[facility]))   To: Referral Coordinators Pool ([id number]_[facility]);     Sent: 7/30/2020 1:42:12 PM CDT  Subject: FW: General Message

## 2022-02-15 NOTE — PROGRESS NOTES
Chief Complaint    Px  History of Present Illness      Pt here today for annual exam      Her knee replacement surgery revision went great with Dr. Montoya she feels much better her pain is resolved she is functioningmuch better her scar looks much better.  She is thrilled with the outcome.      She has a positive ROBERTO and a family history of lupus erythematosus.  She is supposed to get some labs done for her rheumatologist in a couple of months and would like to get her screening for diabetes and for hyperlipidemia      labs done at the same time.      Would like to get her second dose of Shingrix today.      She is due for a Pap smear.  We will get that done today.      Review of systems is negative except as per HPI including no fevers, chills, sore throat, runny nose, nausea, vomiting, constipation, diarrhea, rash or new skin lesions, chest pain, palpitations, slurred speech, new paresthesia, shortness of breath or wheeze.             Exam:      see vitals listed below      General: alert and oriented ×3 no acute distress.      HEENT: Normocephalic and atraumatic.       Eyes pupils are equal round and reactive to light extraocular motion is intact. normal conjunctiva      Hearing is grossly normal and there is no otorrhea. Tympanic membranes are pearly grey with a normal light reflex.      Nares are patent there is no rhinorrhea.       Mucous membranes are moist and pink.      Chest: has bilateral rise with no increased work of breathing. clear to auscultation without wheezes, rhonchi, or rales.      Cardiovascular: normal perfusion and brisk capillary refill. S1S2 with regular rate and rhythm and no murmurs, gallops or rubs.      Musculoskeletal: no gross focal abnormalities and normal gait.      Neuro: no gross focal abnormalities and memory seems intact.  CN 2-12 are grossly intact.      Psychiatric: speech is clear and coherent and fluent. Patient dressed appropriately for the weather. Mood is appropriate and  affect is full.      Abd: no rebound or guarding, normal BS      Skin: no rash or lesion identified      Breast:  Pt declined             : deferred             Discussed:      using sunscreen, protecting from sunburn, regular self skin checks      refer to usdachoosemyplate.gov, AHA and ADA for diet and exercise recommendations      consume [id number] mg calcium daily      do weight bearing exercises      can get mammo at hospital by calling up and scheduling, do not need an order from me      get 120min /week of aerobic exercise      colon cancer screening discussed Cologuard, fecal occult blood test, and colonoscopy.  She prefers to do the fecal occult blood card.      may use vegetable oil for vaginal lubrication if intercourse is painful or may consider R/B of HRT systemic or local       '  Physical Exam   Vitals & Measurements    HR: 63 (Peripheral)  RR: 16  BP: 104/78  SpO2: 99%     HT: 65.25 in  WT: 183.6 lb  BMI: 30.32       Review of systems is negative except as per HPI including no fevers, chills, sore throat, runny nose, nausea, vomiting, constipation, diarrhea, rash or new skin lesions, chest pain, palpitations, slurred speech, new paresthesia, shortness of breath or wheeze.             Exam:      see vitals listed below      General: alert and oriented ×3 no acute distress.      HEENT: Normocephalic and atraumatic.       Eyes pupils are equal round and reactive to light extraocular motion is intact. normal conjunctiva      Hearing is grossly normal and there is no otorrhea. Tympanic membranes are pearly grey with a normal light reflex.      Chest: has bilateral rise with no increased work of breathing. clear to auscultation without wheezes, rhonchi, or rales.      Cardiovascular: normal perfusion and brisk capillary refill. S1S2 with regular rate and rhythm and no murmurs, gallops or rubs.      Musculoskeletal: no gross focal abnormalities and normal gait.      Neuro: no gross focal abnormalities and  memory seems intact.  CN 2-12 are grossly intact.      Psychiatric: speech is clear and coherent and fluent. Patient dressed appropriately for the weather. Mood is appropriate and affect is full.                           Discussed with patient to return to clinic if symptoms worsen or do not improve.  Assessment/Plan       ROBERTO positive (R76.8)         Continue to follow-up with her rheumatologist.         Ordered:          59305 office o/p est mod 30-39 min (Charge), Quantity: 1, Hyperlipidemia  Osteoarthritis of left knee  ROBERTO positive  Ovarian mass                Hyperlipidemia (E78.2)         Will get labs done when she has a scheduled lab draw for her rheumatologist.         Ordered:          27064 office o/p est mod 30-39 min (Charge), Quantity: 1, Hyperlipidemia  Osteoarthritis of left knee  ROBERTO positive  Ovarian mass                Need for shingles vaccine (Z23)         Vaccine provided today.                Osteoarthritis of left knee (M17.12)         Much improved following her revision.         Ordered:          74207 office o/p est mod 30-39 min (Charge), Quantity: 1, Hyperlipidemia  Osteoarthritis of left knee  ROBERTO positive  Ovarian mass                Well woman exam (Z01.419)         Ordered:          44163 periodic preventive med est patient 40-64yrs (Charge), Quantity: 1, Well woman exam          HPV DNA, High Risk with Reflex to Genotypes 16,18* ([facility]), Specimen Type: Pap, Collection Date: 02/19/21 11:44:00 CST          Thinprep Pap* ([facility]), Specimen Type: Pap, Collection Date: 02/19/21 11:44:00 CST                Orders:         zoster vaccine, inactivated, 0.5 mL, IM, once, (Completed)         26839 imadm prq id subq/im njxs 1 vaccine (Charge), Quantity: 1, Encounter for immunization         65939 hzv zoster vacc recombinant adjuvanted im use (Charge), Quantity: 1, Encounter for immunization         Return to Clinic (Request), RFV: FOBT return in one month         Return to Clinic  (Request), RFV: lab only fasting lipid panel, bmp, and HgBA1c, Return in 2 months         Return to Clinic (Request), Return in 2 months, Instructions: Return no sooner than 2 months, no later than 6 months for 2nd Shingrix vaccine.  Patient Information     Name:SANDRA FERNANDEZ      Address:      [address], [place] [id number]     Sex:Female     YOB: 1960     [phone]     Emergency Contact:URMILA FERNANDEZ     MRN:[id number]     FIN:[id number]     Location:[facility]     Date of Service:02/19/2021      Primary Care Physician:       Kristine Valadez MD, [phone]      Attending Physician:       Kristine Valadez MD, [phone]  Problem List/Past Medical History    Ongoing     ROBERTO positive     Family history of lupus erythematosus     Granuloma annulare     History of 2019 novel coronavirus disease (COVID-19)     Hyperlipidemia     Obesity     Osteoarthritis of left knee     Ovarian mass     Prediabetes    Historical     Inpatient stay       Comments: @[place], [place] - Primary osteoarthritis of left knee     Inpatient stay       Comments: [place], [place] - Status post left knee revision with debridement.     Pregnancy     Pregnancy     Varicella       Comments: childhood  Procedure/Surgical History     Revision of left total knee arthroplasty (09/23/2020)     Arthroplasty of knee (12/19/2018)      Comments: Left.     Injection of steroid (04/12/2018)      Comments: Left knee..     Excision (12/19/2017)      Comments: Mass in soft tissue of left upper arm..     Injection of steroid (12/14/2017)      Comments: Left knee..     Injection of steroid (07/27/2017)      Comments: Left knee..     Arthroscopy of knee (2014)      Comments: Left knee.. Partial medial meniscectomy..     Colonoscopy (2005)     LASIK     Surgical procedure      Comments: Vein surgery, left leg..  Medications    calcium (as calcium citrate) 250 mg oral tablet,  250 mg= 1 tab(s), Oral, daily    Fish Oil 1000 mg oral capsule, 1000 mg= 1 cap(s), Oral, bid    magnesium amino acids chelate, 1 tab, Oral, daily    Multi Vitamin+    Vitamin D3, 1 tab, Oral, daily    Voltaren 1% topical gel, 2-4 gm, Topical, qid, 11 refills  Allergies    Nickel (Rash)  Social History    Smoking Status     Never smoker     Alcohol      Never     Electronic Cigarette/Vaping      Electronic Cigarette Use: Never.     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     Exercise      Exercise frequency: 3-4 times/week.     Home/Environment      Marital status: . Spouse/Partner name: Nathan. 2 children. Living situation: Home/Independent. Injuries/Abuse/Neglect in household: No. Feels unsafe at home: No. Family/Friends available for support: Yes.     Nutrition/Health      Diet: Low carbs. Type of diet: Low carbohydrate. Wants to lose weight: Yes. Sleeping concerns: No. Feels highly stressed: No.     Sexual      Sexually active: Yes. Identifies as female, Sexual orientation: Straight or heterosexual. History of STD: No. Uses condoms: Yes. History of sexual abuse: No.     Substance Abuse      Never     Tobacco      Never (less than 100 in lifetime)  Family History    Brain tumor: Aunt (P) (Dx at 55).    CABG - Coronary artery bypass graft: Father (Dx at 60).    COPD - Chronic obstructive pulmonary disease: Father.    Cancer: Aunt and Uncle.    Cancer in situ of lung: Aunt.    Diabetes mellitus type II: Mother and Father.    HA - Headache: Aunt.    Hyperlipidemia: Father.    Hypertension: Mother.    MI - Myocardial infarction: Father (Dx at 50).    Obese: Mother.    Stroke: Father.    Thyroid disease: Mother (Dx at 72).  Immunizations      Vaccine Date Status          zoster vaccine, inactivated 02/19/2021 Given          influenza virus vaccine, inactivated 10/10/2020 Recorded          influenza virus vaccine, inactivated 09/04/2019 Given           zoster vaccine, inactivated 09/04/2019 Given          influenza virus vaccine, inactivated 12/20/2018 Recorded          influenza virus vaccine, inactivated 10/13/2017 Recorded          influenza virus vaccine, inactivated 11/17/2014 Recorded          influenza virus vaccine, inactivated 09/18/2013 Recorded          tetanus/diphth/pertuss (Tdap) adult/adol 09/11/2012 Recorded          influenza virus vaccine, inactivated 09/11/2012 Recorded          Td 12/15/2005 Recorded          MMR (measles/mumps/rubella) 12/12/2005 Recorded          Hep B 09/03/1993 Recorded          Hep B 02/03/1993 Recorded          Hep B 01/05/1993 Recorded          MMR (measles/mumps/rubella) 02/01/1972 Recorded          OPV 01/01/1971 Recorded          OPV 03/01/1970 Recorded          OPV 01/01/1967 Recorded          OPV 11/01/1964 Recorded          OPV 09/01/1964 Recorded          Td 02/01/1963 Recorded          Td 11/03/1961 Recorded          Td 06/28/1961 Recorded          Td 03/30/1961 Recorded  Lab Results       Lab Results (Last 4 results within 90 days)        Complement C3: 130 mg/dL [83 mg/dL - 193 mg/dL] (12/18/20 11:45:00)       Complement C4: 20 mg/dL [15 mg/dL - 57 mg/dL] (12/18/20 11:45:00)       TSH: 1.41 mIU/L [0.4 mIU/L - 4.5 mIU/L] (12/18/20 11:45:00)       U Creatinine: 37 mg/dL [20 mg/dL - 275 mg/dL] (12/18/20 11:45:00)       U Microalbumin: <0.2 (12/18/20 11:45:00)       Ur Microalbumin/Creatinine Ratio: NOTE (12/18/20 11:45:00)       UA pH: 5 [5  - 8] (12/18/20 11:49:00)       UA Specific Gravity: < OR = 1.005 [1.001  - 1.035] (12/18/20 11:49:00)       UA Glucose: NEGATIVE [NEGATIVE  - NEGATIVE] (12/18/20 11:49:00)       UA Bilirubin: NEGATIVE [NEGATIVE  - NEGATIVE] (12/18/20 11:49:00)       UA Ketones: NEGATIVE [NEGATIVE  - NEGATIVE] (12/18/20 11:49:00)       Urine Occult Blood: NEGATIVE [NEGATIVE  - NEGATIVE] (12/18/20 11:49:00)       UA Protein: NEGATIVE [NEGATIVE  - NEGATIVE] (12/18/20 11:49:00)       UA Nitrite:  NEGATIVE [NEGATIVE  - NEGATIVE] (12/18/20 11:49:00)       UA Leukocyte Esterase: TRACE Abnormal [NEGATIVE  - NEGATIVE] (12/18/20 11:49:00)       UA Epithelial Cells: Few [None  - Few] (12/18/20 12:15:00)       UA WBC: 0-2 [None  - 5] (12/18/20 12:15:00)       UA RBC: None Seen [None  - 2] (12/18/20 12:15:00)       UA Bacteria: Rare [None  - Few] (12/18/20 12:15:00)       dRVVT Scrn: 33 (12/18/20 11:45:00)       Lupus Anticoagulant: See comment (12/18/20 11:45:00)       PTT LA Scrn: 33 (12/18/20 11:45:00)       Cardiolipin IgG Ab: <14 (12/18/20 11:45:00)       Cardiolipin IgM Ab: 20 High (12/18/20 11:45:00)       Cardiolipin IgA Ab: <11 (12/18/20 11:45:00)       Urine Culture: See comment (12/18/20 11:50:00)

## 2022-02-15 NOTE — NURSING NOTE
Comprehensive Intake Entered On:  6/18/2020 9:01 AM CDT    Performed On:  6/18/2020 8:53 AM CDT by Carlene Shabazz CMA               Summary   Chief Complaint :   c/o all over joint pain, dry eyes present for 1 year.    Menstrual Status :   Postmenopausal   Weight Measured :   181.8 lb(Converted to: 181 lb 13 oz, 82.46 kg)    Systolic Blood Pressure :   120 mmHg   Diastolic Blood Pressure :   80 mmHg   Mean Arterial Pressure :   93 mmHg   Peripheral Pulse Rate :   73 bpm   BP Site :   Right arm   BP Method :   Manual   HR Method :   Electronic   Temperature Tympanic :   97.8 DegF(Converted to: 36.6 DegC)  (LOW)    Oxygen Saturation :   99 %   Carlene Shabazz CMA - 6/18/2020 8:53 AM CDT   Health Status   Allergies Verified? :   Yes   Medication History Verified? :   Yes   Pre-Visit Planning Status :   N/A   Tobacco Use? :   Never smoker   Carlene Shabazz CMA - 6/18/2020 8:53 AM CDT   Consents   Consent for Immunization Exchange :   Consent Granted   Consent for Immunizations to Providers :   Consent Granted   Carlene Shabazz CMA - 6/18/2020 8:53 AM CDT   Meds / Allergies   (As Of: 6/18/2020 9:01:13 AM CDT)   Allergies (Active)   Nickel  Estimated Onset Date:   Unspecified ; Reactions:   Rash ; Created By:   Odalys García; Reaction Status:   Active ; Category:   Drug ; Substance:   Nickel ; Type:   Allergy ; Updated By:   Odalys García; Reviewed Date:   8/30/2018 9:51 AM CDT        Medication List   (As Of: 6/18/2020 9:01:13 AM CDT)   Home Meds    flax  :   flax ; Status:   Documented ; Ordered As Mnemonic:   Flax Seed Oil ; Simple Display Line:   1,000 mg cap daily, 0 Refill(s) ; Catalog Code:   flax ; Order Dt/Tm:   8/29/2018 9:23:56 AM CDT          multivitamin  :   multivitamin ; Status:   Documented ; Ordered As Mnemonic:   Multi Vitamin+ ; Simple Display Line:   1 tab daily, 0 Refill(s) ; Catalog Code:   multivitamin ; Order Dt/Tm:   8/29/2018 9:24:25 AM CDT          omega-3 polyunsaturated fatty acids  :   omega-3  polyunsaturated fatty acids ; Status:   Documented ; Ordered As Mnemonic:   Fish Oil 1000 mg oral capsule ; Simple Display Line:   1,000 mg, 1 cap(s), Oral, bid, 0 Refill(s) ; Catalog Code:   omega-3 polyunsaturated fatty acids ; Order Dt/Tm:   8/29/2018 9:24:08 AM CDT            ID Risk Screen   Recent Travel History :   No recent travel   Family Member Travel History :   No recent travel   Other Exposure to Infectious Disease :   Unknown   Carlene Shabazz CMA - 6/18/2020 8:53 AM CDT

## 2022-02-15 NOTE — TELEPHONE ENCOUNTER
---------------------  From: Solitario MARTINEZ Eva   Sent: 12/17/2020 11:51:54 AM CST  Subject: rheum f/u q     Pt LM at 1030. Spoke to pt at 1145. She couldn't remember rheum provider she saw on 11/30 and if she was supposed to do any labs for him. I gave her Dr. Valera's office # to call and can hopefully get ahold of his staff for f/u. If outside labs, she was informed she can have done here and results can be faxed to his office. She will let us know if we can help with anything else.

## 2022-02-15 NOTE — PROGRESS NOTES
Chief Complaint    [facility]. [facility]  History of Present Illness      Pt here today for Preop      Pt has no history of blood clots or coagulopathy.  She has some hyperlipidemia and some prediabetes.  She has never had problems with anesthesia.  She has no family history of anesthesia complications.  Both her mom and her brother are hypercoagulable.       She was given vicoden for pain control following her wisdom teeth extraction.  This caused severe nausea.      Review of systems is negative except as per HPI including no fevers, chills, sore throat, runny nose, nausea, vomiting, constipation, diarrhea, rash or new skin lesions, chest pain, palpitations, slurred speech, new paresthesia, shortness of breath or wheeze.             Exam:      see vitals listed below      General: alert and oriented ×3 no acute distress.      HEENT: Normocephalic and atraumatic.       Eyes pupils are equal round and reactive to light extraocular motion is intact. normal conjunctiva      Hearing is grossly normal and there is no otorrhea. Tympanic membranes are pearly grey with a normal light reflex.      Nares are patent there is no rhinorrhea.       Mucous membranes are moist and pink.      Chest: has bilateral rise with no increased work of breathing. clear to auscultation without wheezes, rhonchi, or rales.      Cardiovascular: normal perfusion and brisk capillary refill. S1S2 with regular rate and rhythm and no murmurs, gallops or rubs.      Musculoskeletal: no gross focal abnormalities and normal gait.      Neuro: no gross focal abnormalities and memory seems intact.  CN 2-12 are grossly intact.      Psychiatric: speech is clear and coherent and fluent. Patient dressed appropriately for the weather. Mood is appropriate and affect is full.      Abd: no rebound or guarding, normal BS      Skin: no rash or lesion identified         Physical Exam   Vitals & Measurements    T: 97.3   F (Tympanic)   HR: 69(Peripheral)  BP: 100/66  SpO2: 99%     WT: 179.4 lb   Assessment/Plan       Osteoarthritis of left knee (M17.12)         Orders:          Basic Metabolic Panel* ([facility]), Specimen Type: Serum, Collection Date: 11/28/18 14:30:00 CST          CBC (h/h, RBC, indices, WBC, Plt)* ([facility]), Specimen Type: Blood, Collection Date: 11/28/18 14:30:00 CST                Preop testing (Z01.818)        consider avoiding vicodin and providing antiemetic for pain control.         Orders:          Basic Metabolic Panel* ([facility]), Specimen Type: Serum, Collection Date: 11/28/18 14:30:00 CST          CBC (h/h, RBC, indices, WBC, Plt)* ([facility]), Specimen Type: Blood, Collection Date: 11/28/18 14:30:00 CST               no contraindication for surgery.  ASA 2.  Patient Information     Name:SANDRA FERNANDEZ      Address:      [place] [address]-     Sex:Female     YOB: 1960     [phone]     Emergency Contact:URMILA FERNANDEZ     MRN:[id number]     FIN:[id number]     Location:[facility]     Date of Service:11/28/2018      Primary Care Physician:       NONE ,       Attending Physician:       Geneva Valadez MDica, [phone]  Problem List/Past Medical History    Ongoing     Hyperlipidemia     Obesity     Osteoarthritis of left knee     Ovarian mass     Prediabetes    Historical     Varicella       Comments: childhood  Procedure/Surgical History     Injection of steroid (04/12/2018)            Comments:      Left knee.     Excision (12/19/2017)            Comments:      Mass in soft tissue of left upper arm.     Injection of steroid (12/14/2017)            Comments:      Left knee.     Injection of steroid (07/27/2017)            Comments:      Left knee.     Arthroscopy of knee (2014)            Comments:      Left knee.      Partial medial meniscectomy.     Colonoscopy (2005)           LASIK           Surgical procedure            Comments:      Vein surgery, left  leg.  Medications     Vitamin C: 500 mg, daily, 0 Refill(s).     Multi Vitamin+: 1 tab daily, 0 Refill(s).     acetaminophen-codeine #3: Oral, q4 hrs, PRN: as needed for pain, 0 Refill(s).     Lexapro 10 mg oral tablet: 10 mg, 1 tab(s), Oral, daily, 0 Refill(s).     Flax Seed Oil: 1,000 mg cap daily, 0 Refill(s).     Fish Oil 1000 mg oral capsule: 1,000 mg, 1 cap(s), Oral, bid, 0 Refill(s).          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 Exercise type: Swimming, Yoga., 09/06/2018     Home and Environment      Marital status: . Spouse/Partner name: Nathan. Risks in environment: Stairs., 09/06/2018     Nutrition and Health      Type of diet: Regular., 09/06/2018     Sexual      Sexually active: Yes. Identifies as female, Sexual orientation: Straight or heterosexual. Uses condoms: Yes., 09/06/2018     Substance Abuse      Never, 09/06/2018     Tobacco      Never (less than 100 in lifetime), 09/06/2018  Family History    Brain tumor: Aunt (P) (Dx at 55).    CABG - Coronary artery bypass graft: Father (Dx at 60).    COPD - Chronic obstructive pulmonary disease: Father.    Diabetes mellitus type II: Mother and Father.    Hyperlipidemia: Father.    Hypertension: Mother.    MI - Myocardial infarction: Father (Dx at 50).    Obese: Mother.    Stroke: Father.    Thyroid disease: Mother (Dx at 72).  Immunizations      Vaccine Date Status      influenza virus vaccine, inactivated 10/13/2017 Recorded      influenza virus vaccine, inactivated 11/17/2014 Recorded      influenza virus vaccine, inactivated 09/18/2013 Recorded      tetanus/diphth/pertuss (Tdap) adult/adol 09/11/2012 Recorded      Td 12/15/2005 Recorded      MMR (measles/mumps/rubella) 12/12/2005 Recorded      Hep B  09/03/1993 Recorded      Hep B 02/03/1993 Recorded      Hep B 01/05/1993 Recorded      MMR (measles/mumps/rubella) 02/01/1972 Recorded      OPV 01/01/1971 Recorded      OPV 03/01/1970 Recorded      OPV 01/01/1967 Recorded      OPV 11/01/1964 Recorded      OPV 09/01/1964 Recorded

## 2022-02-15 NOTE — LETTER
(Inserted Image. Unable to display)   January 08, 2020        SANDRA JIM  [id number] [place], [place] [id number]        Dear SANDRA,      Thank you for selecting [facility] for your healthcare needs.    Our records indicate you are due for the following services:     Immunizations: Shingrix #2 Vaccine    To schedule an appointment or if you have further questions, please contact your primary clinic:   [facility]       [phone]   [facility]       [phone]              [facility]     [phone]      Powered by [employer] and [employer]    Sincerely,    Kristine Valadez MD

## 2022-02-15 NOTE — NURSING NOTE
Phone Message          Time of Call:  11:51am       Person Calling:  Wanda  Phone number:  _    Returned call at: _    Note:   Pt calls stating she had labs done on 3/31 and one of the labs done at the appt was suppose to be faxed to Dr. Keith Valera but they state they have not received it. I verified the fax number it was sent to and it was the wrong number. It was refaxed today to [phone]. Received confirmation that it went through.    [facility] CMA

## 2022-02-15 NOTE — NURSING NOTE
Comprehensive Intake Entered On:  6/25/2020 2:15 PM CDT    Performed On:  6/25/2020 2:03 PM CDT by Aracely Cochran CMA   Systolic Blood Pressure :   110 mmHg   Diastolic Blood Pressure :   68 mmHg   Mean Arterial Pressure :   82 mmHg   Peripheral Pulse Rate :   72 bpm   Pulse Site :   Radial artery   Temperature Tympanic :   98.1 DegF(Converted to: 36.7 DegC)    Aracely Cochran CMA - 6/25/2020 2:16 PM CDT   Chief Complaint :   Patient presents for preoperative clearance left knee orthoscopic surgery DOS 07/17/2020 Dr Lenore PATTEN at [facility].   Menstrual Status :   Postmenopausal   BP Site :   Right arm   BP Method :   Manual   HR Method :   Manual   Aracely Cochran CMA - 6/25/2020 2:03 PM CDT   Health Status   Allergies Verified? :   Yes   Medication History Verified? :   Yes   Pre-Visit Planning Status :   Completed   Tobacco Use? :   Never smoker   Aracely Cochran CMA - 6/25/2020 2:03 PM CDT   Consents   Consent for Immunization Exchange :   Consent Granted   Consent for Immunizations to Providers :   Consent Granted   Aracely Cochran CMA - 6/25/2020 2:03 PM CDT   Meds / Allergies   (As Of: 6/25/2020 2:15:49 PM CDT)   Allergies (Active)   Nickel  Estimated Onset Date:   Unspecified ; Reactions:   Rash ; Created By:   Odalys García; Reaction Status:   Active ; Category:   Drug ; Substance:   Nickel ; Type:   Allergy ; Updated By:   Odalys García; Reviewed Date:   6/25/2020 2:09 PM CDT        Medication List   (As Of: 6/25/2020 2:15:49 PM CDT)   Prescription/Discharge Order    diclofenac topical  :   diclofenac topical ; Status:   Prescribed ; Ordered As Mnemonic:   Voltaren 1% topical gel ; Simple Display Line:   2-4 gm, Topical, qid, not to exceed 32 grams/day, 1 EA, 11 Refill(s) ; Ordering Provider:   Kristine Valadez MD; Catalog Code:   diclofenac topical ; Order Dt/Tm:   6/18/2020 9:42:04 AM CDT            Home Meds    calcium citrate  :   calcium citrate ; Status:    Documented ; Ordered As Mnemonic:   calcium (as calcium citrate) 250 mg oral tablet ; Simple Display Line:   250 mg, 1 tab(s), Oral, daily, 0 Refill(s) ; Catalog Code:   calcium citrate ; Order Dt/Tm:   6/25/2020 2:14:00 PM CDT          cholecalciferol  :   cholecalciferol ; Status:   Documented ; Ordered As Mnemonic:   Vitamin D3 ; Simple Display Line:   1 tab, Oral, daily, 0 Refill(s) ; Catalog Code:   cholecalciferol ; Order Dt/Tm:   6/25/2020 2:13:55 PM CDT          magnesium amino acids chelate  :   magnesium amino acids chelate ; Status:   Documented ; Ordered As Mnemonic:   magnesium amino acids chelate ; Simple Display Line:   1 tab, Oral, daily, 0 Refill(s) ; Catalog Code:   magnesium amino acids chelate ; Order Dt/Tm:   6/25/2020 2:12:08 PM CDT          multivitamin  :   multivitamin ; Status:   Documented ; Ordered As Mnemonic:   Multi Vitamin+ ; Simple Display Line:   1 tab daily, 0 Refill(s) ; Catalog Code:   multivitamin ; Order Dt/Tm:   8/29/2018 9:24:25 AM CDT          omega-3 polyunsaturated fatty acids  :   omega-3 polyunsaturated fatty acids ; Status:   Documented ; Ordered As Mnemonic:   Fish Oil 1000 mg oral capsule ; Simple Display Line:   1,000 mg, 1 cap(s), Oral, bid, 0 Refill(s) ; Catalog Code:   omega-3 polyunsaturated fatty acids ; Order Dt/Tm:   8/29/2018 9:24:08 AM CDT            ID Risk Screen   Recent Travel History :   No recent travel   Family Member Travel History :   No recent travel   Other Exposure to Infectious Disease :   Unknown   Aracely Cochran CMA - 6/25/2020 2:03 PM CDT

## 2022-02-15 NOTE — NURSING NOTE
Comprehensive Intake Entered On:  2/19/2021 11:34 AM CST    Performed On:  2/19/2021 11:24 AM CST by Lou Sánchez               Summary   Chief Complaint :   Px   Menstrual Status :   Postmenopausal   Weight Measured :   183.6 lb(Converted to: 183 lb 10 oz, 83.280 kg)    Height Measured :   65.25 in(Converted to: 5 ft 5 in, 165.73 cm)    Body Mass Index :   30.32 kg/m2 (HI)    Body Surface Area :   1.96 m2   Systolic Blood Pressure :   104 mmHg   Diastolic Blood Pressure :   78 mmHg   Mean Arterial Pressure :   87 mmHg   Peripheral Pulse Rate :   63 bpm   BP Site :   Right arm   BP Method :   Manual   Respiratory Rate :   16 br/min   Oxygen Saturation :   99 %   Lou Sánchez - 2/19/2021 11:24 AM CST   Health Status   Allergies Verified? :   Yes   Medication History Verified? :   Yes   Immunizations Current :   Yes   Pre-Visit Planning Status :   Completed   Tobacco Use? :   Never smoker   Lou Sánchez - 2/19/2021 11:24 AM CST   Consents   Consent for Immunization Exchange :   Consent Granted   Consent for Immunizations to Providers :   Consent Granted   Lou Sánchez - 2/19/2021 11:24 AM CST   Meds / Allergies   (As Of: 2/19/2021 11:34:38 AM CST)   Allergies (Active)   Nickel  Estimated Onset Date:   Unspecified ; Reactions:   Rash ; Created By:   Odalys García; Reaction Status:   Active ; Category:   Drug ; Substance:   Nickel ; Type:   Allergy ; Updated By:   Odalys García; Reviewed Date:   2/19/2021 11:29 AM CST        Medication List   (As Of: 2/19/2021 11:34:38 AM CST)   Prescription/Discharge Order    diclofenac topical  :   diclofenac topical ; Status:   Prescribed ; Ordered As Mnemonic:   Voltaren 1% topical gel ; Simple Display Line:   2-4 gm, Topical, qid, not to exceed 32 grams/day, 1 EA, 11 Refill(s) ; Ordering Provider:   Kristine Valadez MD; Catalog Code:   diclofenac topical ; Order Dt/Tm:   6/18/2020 9:42:04 AM CDT            Home Meds    omega-3 polyunsaturated fatty acids  :   omega-3  polyunsaturated fatty acids ; Status:   Documented ; Ordered As Mnemonic:   Fish Oil 1000 mg oral capsule ; Simple Display Line:   1,000 mg, 1 cap(s), Oral, bid, 0 Refill(s) ; Catalog Code:   omega-3 polyunsaturated fatty acids ; Order Dt/Tm:   8/29/2018 9:24:08 AM CDT          multivitamin  :   multivitamin ; Status:   Documented ; Ordered As Mnemonic:   Multi Vitamin+ ; Simple Display Line:   1 tab daily, 0 Refill(s) ; Catalog Code:   multivitamin ; Order Dt/Tm:   8/29/2018 9:24:25 AM CDT          magnesium amino acids chelate  :   magnesium amino acids chelate ; Status:   Documented ; Ordered As Mnemonic:   magnesium amino acids chelate ; Simple Display Line:   1 tab, Oral, daily, 0 Refill(s) ; Catalog Code:   magnesium amino acids chelate ; Order Dt/Tm:   6/25/2020 2:12:08 PM CDT          cholecalciferol  :   cholecalciferol ; Status:   Documented ; Ordered As Mnemonic:   Vitamin D3 ; Simple Display Line:   1 tab, Oral, daily, 0 Refill(s) ; Catalog Code:   cholecalciferol ; Order Dt/Tm:   6/25/2020 2:13:55 PM CDT          calcium citrate  :   calcium citrate ; Status:   Documented ; Ordered As Mnemonic:   calcium (as calcium citrate) 250 mg oral tablet ; Simple Display Line:   250 mg, 1 tab(s), Oral, daily, 0 Refill(s) ; Catalog Code:   calcium citrate ; Order Dt/Tm:   6/25/2020 2:14:00 PM CDT            ID Risk Screen   Recent Travel History :   No recent travel   Family Member Travel History :   No recent travel   Other Exposure to Infectious Disease :   Unknown   COVID-19 Testing Status :   No COVID-19 test performed   Lou Sánchez - 2/19/2021 11:24 AM CST   Social History   Social History   (As Of: 2/19/2021 11:34:38 AM CST)   Alcohol:        Never   (Last Updated: 9/6/2018 9:37:19 AM CDT by Marleni De La Vega)          Tobacco:        Never (less than 100 in lifetime)   (Last Updated: 2/19/2021 11:29:37 AM CST by Lou Sánchez)          Electronic Cigarette/Vaping:        Electronic Cigarette Use: Never.    (Last Updated: 9/4/2020 9:03:34 AM CDT by Lyla Hong)          Substance Abuse:        Never   (Last Updated: 9/6/2018 9:37:29 AM CDT by Marleni De La Vega)          Employment/School:        Retired, Work/School description: CNA.  Highest education level: [employer].   (Last Updated: 9/6/2019 10:21:51 AM CDT by Lyla Hong)          Home/Environment:        Marital status: .  Spouse/Partner name: Nathan.  2 children.  Living situation: Home/Independent.  Injuries/Abuse/Neglect in household: No.  Feels unsafe at home: No.  Family/Friends available for support: Yes.   (Last Updated: 9/6/2019 10:22:13 AM CDT by Lyla Hong)          Nutrition/Health:        Diet: Low carbs.  Type of diet: Low carbohydrate.  Wants to lose weight: Yes.  Sleeping concerns: No.  Feels highly stressed: No.   (Last Updated: 9/6/2019 10:22:41 AM CDT by Lyla Hong)          Exercise:        Exercise frequency: 3-4 times/week.   (Last Updated: 9/4/2020 9:04:12 AM CDT by Lyla Hong)          Sexual:        Sexually active: Yes.  Identifies as female, Sexual orientation: Straight or heterosexual.  History of STD: No.  Uses condoms: Yes.  History of sexual abuse: No.   (Last Updated: 9/6/2019 10:23:10 AM CDT by Lyla Hong)

## 2022-02-15 NOTE — TELEPHONE ENCOUNTER
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  as long as she does not have a contraindication to getting an MRI I think we should get one to see if the spinal cord is getting compressed at this level.  we could schedue a telemed visit to [facility] if she would like to or d=we could just order the [id number] at 0950.pt has appt today at 2- will discuss MRI then.

## 2022-02-15 NOTE — TELEPHONE ENCOUNTER
---------------------  From: Kristine Valadez MD   To: [facility] Message Pool ([facility]); SANDRA FERNANDEZ    Sent: 9/24/2019 7:29:47 PM CDT  Subject: RE: Lab results (creatinine)     the advil can be hard on the kidneys, CT contrast can also be hard on your kidneys.  avoid advil and alieve, ok to use the tylenol.  you can do a direct access lab here at the clinic and get your test repeated, but I won't see the results.  If it is normal then you are ok and you should just avoid advil/alieve, if it is not normal then you should make an appointment with Dr. Neil Leblanc, he is a kidney specialist in our clinic and your money would be better spent seeing him than spending it to see me so I can tell you to see a kidney specialist!  I hope that helps!      ---------------------  From: Carlene Gaviria CMA ([facility] Message Pool ([address] [place] [address]))   To: Kristine Valadez MD;     Sent: 9/24/2019 4:20:25 PM CDT  Subject: FW: Lab results (creatinine)           ---------------------  From: Boo/Myla CASE (Phone Messages Pool ([address] [place] [address]))   To: [employer] Message Pool ([address] [place] [address]);     Sent: 9/24/2019 4:18:52 PM CDT  Subject: FW: Lab results (creatinine)           ---------------------  From: SANDRA FERNANDEZ  To: Pending sale to [facility]  Sent: 09/24/2019 03:52 p.m. CDT  Subject: Lab results (creatinine)  Dr Kristine Valadez.  I got notice that my kidney labs are high and low. They recommend I come in and talk with you.  Is this something that we can monitor or retest in a few months.  Prior to my Knee surgery I was taking a lot of Tylenol and Advil. Could this have damaged the kidneys?  I have not been on any medications since Feb though.  I m drinking 4-6 bottles of water a day since I ve had my knee replaced and I work out 5 days a week.  Coming in for a visit will cost a dr visit. We are on a high deductible plan. ??  I ve increased my protein intake too if you think it might  be diet related!?  Please let me know your thoughts.  Thanks Wanda.

## 2022-02-15 NOTE — NURSING NOTE
Comprehensive Intake Entered On:  10/2/2019 9:35 AM CDT    Performed On:  10/2/2019 9:35 AM CDT by Eva Jerez CMA               Summary   Chief Complaint :   u/s per MJP   Menstrual Status :   Postmenopausal   Height Measured :   65 in(Converted to: 5 ft 5 in, 165.10 cm)    Eva Jerez CMA - 10/2/2019 9:35 AM CDT

## 2022-02-15 NOTE — TELEPHONE ENCOUNTER
---------------------  From: Kristine Valadez MD   To: MJCANDACE Message Pool ([id number]);     Sent: 9/8/2019 1:57:19 PM CDT  Subject: General Message     please invite patient to RTC to discuss labs, she has high cholesterol and her kidney function is not normal.  Welcome to follow up with me or could see Dr. Leblanc also        Results:  Date Result Name Ind Value Ref Range   9/5/2019 8:39 AM Sodium Level  138 mmol/L (135 - 146)   9/5/2019 8:39 AM Potassium Level  4.5 mmol/L (3.5 - 5.3)   9/5/2019 8:39 AM Chloride Level  101 mmol/L (98 - 110)   9/5/2019 8:39 AM CO2 Level  27 mmol/L (20 - 32)   9/5/2019 8:39 AM Glucose Level  83 mg/dL (65 - 99)   9/5/2019 8:39 AM BUN  21 mg/dL (7 - 25)   9/5/2019 8:39 AM Creatinine Level ((H)) 1.17 mg/dL (0.50 - 1.05)   9/5/2019 8:39 AM BUN/Creat Ratio  18 (6 - 22)   9/5/2019 8:39 AM eGFR ((L)) 51 mL/min/1.73m2 (> OR = 60 - )   9/5/2019 8:39 AM eGFR  ((L)) 59 mL/min/1.73m2 (> OR = 60 - )   9/5/2019 8:39 AM Calcium Level  10.4 mg/dL (8.6 - 10.4)   9/5/2019 8:39 AM Hgb A1c  4.9 ( - <5.7)   9/5/2019 8:39 AM Cholesterol ((H)) 295 mg/dL ( - <200)   9/5/2019 8:39 AM Non-HDL Cholesterol ((H)) 222 ( - <130)   9/5/2019 8:39 AM HDL  73 mg/dL (>50 - )   9/5/2019 8:39 AM Cholesterol/HDL Ratio  4.0 ( - <5.0)   9/5/2019 8:39 AM LDL ((H)) 201    9/5/2019 8:39 AM Triglyceride  89 mg/dL ( - <150)Spoke to patient at 1105. Advised her of MJP message, she verbalized understanding and was transferred to scheduling.

## 2022-02-15 NOTE — NURSING NOTE
CAGE Assessment Entered On:  2/22/2021 10:43 AM CST    Performed On:  2/19/2021 10:43 AM CST by Arely Dyson CMA               Assessment   Have you ever felt you should cut down on your drinking :   No   Have people annoyed you by criticizing your drinking :   No   Have you ever felt bad or guilty about your drinking :   No   Have you ever taken a drink first thing in the morning to steady your nerves or get rid of a hangover (Eye-opener) :   No   CAGE Score :   0    Arely Dyson CMA - 2/22/2021 10:43 AM CST

## 2022-02-15 NOTE — LETTER
(Inserted Image. Unable to display)       August 15, 2020      SANDRA JIM  [id number] [place], [place] [id number]        Dear SANDRA,     Thank you for selecting [facility] for your healthcare needs. Below you will find the results of your recent test(s) done at our clinic.      You do have an antibody that is found in people that have scleroderma, which is a type of autoimmune disease.  Would you like me to place a referral to Rheumatology for you?      Result Name Current Result Reference Range   SSA (Ro)  <1.0 NEG 7/30/2020  - <1.0 NEG   SSB (La)  <1.0 NEG 7/30/2020  - <1.0 NEG   Scleroderma (SCL-70) Ab ((A)) 2.4 POS 7/30/2020  - <1.0 NEG   Dorota-1 Ab  <1.0 NEG 7/30/2020  - <1.0 NEG     Please contact my practice at [phone] if you have any questions or concerns.     Sincerely,        Kristine Valadez MD      What do your labs mean?  Below is a glossary of commonly ordered labs:  LDL   Bad Cholesterol   HDL   Good Cholesterol  AST/ALT   Liver Function   Cr/Creatinine   Kidney Function  Microalbumin   Kidney Function  BUN   Kidney Function  PSA   Prostate    TSH   Thyroid Hormone  HgbA1c   Diabetes Test   Hgb (Hemoglobin)   Red Blood Cells

## 2022-02-15 NOTE — NURSING NOTE
Comprehensive Intake Entered On:  7/30/2020 8:02 AM CDT    Performed On:  7/30/2020 7:54 AM CDT by Carlene Shabazz CMA               Summary   Chief Complaint :   Pre-op. DOS 8/24/2020- Left knee, [facility]- Dr. Montoya.    Menstrual Status :   Postmenopausal   Weight Measured :   179.8 lb(Converted to: 179 lb 13 oz, 81.56 kg)    Systolic Blood Pressure :   118 mmHg   Diastolic Blood Pressure :   80 mmHg   Mean Arterial Pressure :   93 mmHg   Peripheral Pulse Rate :   71 bpm   BP Site :   Right arm   BP Method :   Manual   HR Method :   Electronic   Temperature Tympanic :   97.2 DegF(Converted to: 36.2 DegC)  (LOW)    Oxygen Saturation :   98 %   Carlene Shabazz CMA - 7/30/2020 7:54 AM CDT   Health Status   Allergies Verified? :   Yes   Medication History Verified? :   Yes   Pre-Visit Planning Status :   N/A   Tobacco Use? :   Never smoker   Carlene Shabazz CMA - 7/30/2020 7:54 AM CDT   Consents   Consent for Immunization Exchange :   Consent Granted   Consent for Immunizations to Providers :   Consent Granted   Carlene Shabazz CMA - 7/30/2020 7:54 AM CDT   Meds / Allergies   (As Of: 7/30/2020 8:02:10 AM CDT)   Allergies (Active)   Nickel  Estimated Onset Date:   Unspecified ; Reactions:   Rash ; Created By:   Odalys García; Reaction Status:   Active ; Category:   Drug ; Substance:   Nickel ; Type:   Allergy ; Updated By:   Odalys García; Reviewed Date:   6/25/2020 2:09 PM CDT        Medication List   (As Of: 7/30/2020 8:02:10 AM CDT)   Prescription/Discharge Order    diclofenac topical  :   diclofenac topical ; Status:   Prescribed ; Ordered As Mnemonic:   Voltaren 1% topical gel ; Simple Display Line:   2-4 gm, Topical, qid, not to exceed 32 grams/day, 1 EA, 11 Refill(s) ; Ordering Provider:   Kristine Valadez MD; Catalog Code:   diclofenac topical ; Order Dt/Tm:   6/18/2020 9:42:04 AM CDT            Home Meds    calcium citrate  :   calcium citrate ; Status:   Documented ; Ordered As Mnemonic:   calcium (as calcium  citrate) 250 mg oral tablet ; Simple Display Line:   250 mg, 1 tab(s), Oral, daily, 0 Refill(s) ; Catalog Code:   calcium citrate ; Order Dt/Tm:   6/25/2020 2:14:00 PM CDT          cholecalciferol  :   cholecalciferol ; Status:   Documented ; Ordered As Mnemonic:   Vitamin D3 ; Simple Display Line:   1 tab, Oral, daily, 0 Refill(s) ; Catalog Code:   cholecalciferol ; Order Dt/Tm:   6/25/2020 2:13:55 PM CDT          magnesium amino acids chelate  :   magnesium amino acids chelate ; Status:   Documented ; Ordered As Mnemonic:   magnesium amino acids chelate ; Simple Display Line:   1 tab, Oral, daily, 0 Refill(s) ; Catalog Code:   magnesium amino acids chelate ; Order Dt/Tm:   6/25/2020 2:12:08 PM CDT          multivitamin  :   multivitamin ; Status:   Documented ; Ordered As Mnemonic:   Multi Vitamin+ ; Simple Display Line:   1 tab daily, 0 Refill(s) ; Catalog Code:   multivitamin ; Order Dt/Tm:   8/29/2018 9:24:25 AM CDT          omega-3 polyunsaturated fatty acids  :   omega-3 polyunsaturated fatty acids ; Status:   Documented ; Ordered As Mnemonic:   Fish Oil 1000 mg oral capsule ; Simple Display Line:   1,000 mg, 1 cap(s), Oral, bid, 0 Refill(s) ; Catalog Code:   omega-3 polyunsaturated fatty acids ; Order Dt/Tm:   8/29/2018 9:24:08 AM CDT            ID Risk Screen   Recent Travel History :   No recent travel   Family Member Travel History :   No recent travel   Other Exposure to Infectious Disease :   Unknown   Carlene Shabazz CMA - 7/30/2020 7:54 AM CDT

## 2022-02-15 NOTE — TELEPHONE ENCOUNTER
---------------------  From: Aracelis Jean-Baptiste LPN (Phone Messages Pool ([address] [place] [address]))   To: [facility] Message Pool ([address] [place] [address]);     Sent: 3/12/2021 2:35:15 PM CST  Subject: results     Phone Message    PCP:   CANDACE      Time of Call:  2:25pm       Person Calling:  Pt  Phone number:  [phone]    Note:   Pt calling looking for stool results. Pt says she has not received a letter or call.    Results are in chart from 3/3/21.    Pt informed she might not get a call back today due to moving of clinics- she is ok with this.    Last office visit and reason:  2/19/21 Office Visit Note---------------------  From: Lou Sánchez (P Message Pool ([address] [place] [address]))   To: [facility] Message Pool ([address] [place] [address]);     Sent: 3/16/2021 11:30:58 AM CDT  Subject: FW: results     Could you please report on pt's stool test as [facility] is out of clinic this week?---------------------  From: Boo/Myla CASE ([facility] Message Pool ([address] [place] [address]))   To: P Message Pool ([address] [place] [address]);     Sent: 3/16/2021 1:55:54 PM CDT  Subject: FW: results     [facility] oc.

## 2022-02-15 NOTE — PROGRESS NOTES
Chief Complaint    Pre-op. DOS 9/23/2020- Dr. Montoya- Left Knee- [place]- [facility].  History of Present Illness      patient present to clinic today for Preop,      Her left knee surgery had to be rescheduled after patient developed COVID-19.  She reports that her test was positive that she noticed loss of sense of smell and a mild cough.  She is off of quarantine now and needs a new preop exam.  Explained to patient that the novel coronavirus is associated with an increased risk of coagulopathy and orthopedic surgeries have an increased risk of coagulopathy so I would like to check a d-dimer today.  There is also possibility of developing a myocarditis even after only a mild case of covid 19.  She did get cardiac clearance after her last preop however she then developed the novel coronavirus so we will repeat EKG today.      no personal or family history of coagulopathy or problems with anesthesia      Review of systems 12 point review of systems negative except as per HPI      Exam:      General: alert and oriented ×3 no acute distress.      HEENT: pupils are equal round and reactive to light extraocular motion is intact. Normocephalic and atraumatic.       Hearing is grossly normal and there is no otorrhea. TM pearly grey with normal likght reflex      Nares are patent there is no rhinorrhea.       Mucous membranes are moist and pink.      Chest: has bilateral rise with no increased work of breathing.  ctab no wheezes,  rhonchi or rales      Cardiovascular: normal perfusion and brisk capillary refill.  nml s1s2, no m/g/r      Musculoskeletal: no gross focal abnormalities and normal gait.      Neuro: no gross focal abnormalities and memory seems intact.      Psychiatric: speech is clear and coherent and fluent. Patient dressed appropriately for the weather. Mood is appropriate and affect is full.                     Discussed  ASA category 2                         no contraindication for procedure      EKG today looks  completely normal.  Also see separate EKG note.  Her sedimentation rate is normal at 22.  Her d-dimer is normal at 0.30.  Her chest x-ray shows minimal linear atelectasis or scarring in the left lower lung no consolidation pleural effusions or pneumothorax.  Her CBC today looks perfect.  Hemoglobin is 14.1.  Her basic metabolic panel looks great.  Her BUN to creatinine ratio is slightly high at 21 with 20 being the upper limits of normal however she reports she has not drink very much water today.  Her hepatic panel also looks terrific.  Physical Exam   Vitals & Measurements    T: 98.9  F (Tympanic)  HR: 73 (Peripheral)  BP: 128/78  SpO2: 98%     WT: 184.4 lb   Assessment/Plan       History of 2019 novel coronavirus disease (COVID-19) (Z86.19)         Ordered:          86826 office outpatient visit 25 minutes (Charge), Quantity: 1, Left knee pain  Osteoarthritis of left knee  History of 2019 novel coronavirus disease (COVID-19)  Preop examination          Basic Metabolic Panel (Request), Priority: Urgent, History of 2019 novel coronavirus disease (COVID-19)  Preop testing  Left knee pain          CBC w/ Diff/Plt (Request), Priority: Urgent, History of 2019 novel coronavirus disease (COVID-19)  Preop testing  Left knee pain          D-Dimer (Request), Priority: Urgent, History of 2019 novel coronavirus disease (COVID-19)  Preop testing  Left knee pain          Hepatic Function Panel (Request), Priority: Urgent, History of 2019 novel coronavirus disease (COVID-19)  Preop testing  Left knee pain          Sedimentation Rate, Westergren (Request), Priority: Urgent, History of 2019 novel coronavirus disease (COVID-19)  Preop testing  Left knee pain                Left knee pain (M25.562)         Ordered:          [id number] office outpatient visit 25 minutes (Charge), Quantity: 1, Left knee pain  Osteoarthritis of left knee  History of 2019 novel coronavirus disease (COVID-19)  Preop examination          Basic  Metabolic Panel (Request), Priority: Urgent, History of 2019 novel coronavirus disease (COVID-19)  Preop testing  Left knee pain          CBC w/ Diff/Plt (Request), Priority: Urgent, History of 2019 novel coronavirus disease (COVID-19)  Preop testing  Left knee pain          D-Dimer (Request), Priority: Urgent, History of 2019 novel coronavirus disease (COVID-19)  Preop testing  Left knee pain          Hepatic Function Panel (Request), Priority: Urgent, History of 2019 novel coronavirus disease (COVID-19)  Preop testing  Left knee pain          Sedimentation Rate, Westergren (Request), Priority: Urgent, History of 2019 novel coronavirus disease (COVID-19)  Preop testing  Left knee pain                Osteoarthritis of left knee (M17.12)         Ordered:          34306 office outpatient visit 25 minutes (Charge), Quantity: 1, Left knee pain  Osteoarthritis of left knee  History of 2019 novel coronavirus disease (COVID-19)  Preop examination                Preop examination (Z01.818)         Ordered:          [id number] ecg routine ecg w/least 12 lds w/i+r (Charge), Quantity: 1, Preop examination          77329 office outpatient visit 25 minutes (Charge), Quantity: 1, Left knee pain  Osteoarthritis of left knee  History of 2019 novel coronavirus disease (COVID-19)  Preop examination          XR Chest PA/Lat (Request), Priority: STAT, Preop examination                Preop testing (Z01.818)         Ordered:          Basic Metabolic Panel (Request), Priority: Urgent, History of 2019 novel coronavirus disease (COVID-19)  Preop testing  Left knee pain          CBC w/ Diff/Plt (Request), Priority: Urgent, History of 2019 novel coronavirus disease (COVID-19)  Preop testing  Left knee pain          D-Dimer (Request), Priority: Urgent, History of 2019 novel coronavirus disease (COVID-19)  Preop testing  Left knee pain          Hepatic Function Panel (Request), Priority: Urgent, History of 2019 novel  coronavirus disease (COVID-19)  Preop testing  Left knee pain          Sedimentation Rate, Celineren (Request), Priority: Urgent, History of 2019 novel coronavirus disease (COVID-19)  Preop testing  Left knee pain               Patient has been cleared by the public health department to be released from quarantine.  She has her surgery scheduled.  I do not see any evidence of blood clot.  Also no evidence of myocarditis.  Patient Information     Name:SANDRA FERNANDEZ      Address:      [address] [id number]     Sex:Female     YOB: 1960     [phone]     Emergency Contact:URMILA FERNANDEZ     MRN:[id number]     FIN:[id number]     Location:[facility]     Date of Service:09/02/2020      Primary Care Physician:       Kristine Valadez MD, [phone]      Attending Physician:       Kristine Valadez MD, [phone]  Problem List/Past Medical History    Ongoing     ROBERTO positive     Family history of lupus erythematosus     Granuloma annulare     History of 2019 novel coronavirus disease (COVID-19)     Hyperlipidemia     Obesity     Osteoarthritis of left knee     Ovarian mass     Prediabetes    Historical     Inpatient stay       Comments: @[place], [place] - Primary osteoarthritis of left knee     Pregnancy     Pregnancy     Varicella       Comments: childhood  Procedure/Surgical History     Arthroplasty of knee (12/19/2018)      Comments: Left.     Injection of steroid (04/12/2018)      Comments: Left knee..     Excision (12/19/2017)      Comments: Mass in soft tissue of left upper arm..     Injection of steroid (12/14/2017)      Comments: Left knee..     Injection of steroid (07/27/2017)      Comments: Left knee..     Arthroscopy of knee (2014)      Comments: Left knee.. Partial medial meniscectomy..     Colonoscopy (2005)     LASIK     Surgical procedure      Comments: Vein surgery, left leg..  Medications    calcium (as calcium  citrate) 250 mg oral tablet, 250 mg= 1 tab(s), Oral, daily    Fish Oil 1000 mg oral capsule, 1000 mg= 1 cap(s), Oral, bid    magnesium amino acids chelate, 1 tab, Oral, daily    Multi Vitamin+    Vitamin D3, 1 tab, Oral, daily    Voltaren 1% topical gel, 2-4 gm, Topical, qid, 11 refills  Allergies    Nickel (Rash)  Social History    Smoking Status     Never smoker     Alcohol      Never     Employment/School      Retired, Work/School description: CNA. Highest education level: [employer].     Exercise      Exercise frequency: 3-4 times/week. Exercise type: Swimming, Yoga.     Home/Environment      Marital status: . Spouse/Partner name: Nathan. 2 children. Living situation: Home/Independent. Injuries/Abuse/Neglect in household: No. Feels unsafe at home: No. Family/Friends available for support: Yes.     Nutrition/Health      Diet: Low carbs. Type of diet: Low carbohydrate. Wants to lose weight: Yes. Sleeping concerns: No. Feels highly stressed: No.     Sexual      Sexually active: Yes. Identifies as female, Sexual orientation: Straight or heterosexual. History of STD: No. Uses condoms: Yes. History of sexual abuse: No.     Substance Abuse      Never     Tobacco      Never (less than 100 in lifetime)  Family History    Brain tumor: Aunt (P) (Dx at 55).    CABG - Coronary artery bypass graft: Father (Dx at 60).    COPD - Chronic obstructive pulmonary disease: Father.    Cancer: Aunt and Uncle.    Cancer in situ of lung: Aunt.    Diabetes mellitus type II: Mother and Father.    HA - Headache: Aunt.    Hyperlipidemia: Father.    Hypertension: Mother.    MI - Myocardial infarction: Father (Dx at 50).    Obese: Mother.    Stroke: Father.    Thyroid disease: Mother (Dx at 72).  Immunizations      Vaccine Date Status          influenza virus vaccine, inactivated 09/04/2019 Given          zoster vaccine, inactivated 09/04/2019 Given          influenza virus vaccine, inactivated 12/20/2018 Recorded          influenza  virus vaccine, inactivated 10/13/2017 Recorded          influenza virus vaccine, inactivated 11/17/2014 Recorded          influenza virus vaccine, inactivated 09/18/2013 Recorded          influenza virus vaccine, inactivated 09/11/2012 Recorded          tetanus/diphth/pertuss (Tdap) adult/adol 09/11/2012 Recorded          Td 12/15/2005 Recorded          MMR (measles/mumps/rubella) 12/12/2005 Recorded          Hep B 09/03/1993 Recorded          Hep B 02/03/1993 Recorded          Hep B 01/05/1993 Recorded          MMR (measles/mumps/rubella) 02/01/1972 Recorded          OPV 01/01/1971 Recorded          OPV 03/01/1970 Recorded          OPV 01/01/1967 Recorded          OPV 11/01/1964 Recorded          OPV 09/01/1964 Recorded          Td 02/01/1963 Recorded          Td 11/03/1961 Recorded          Td 06/28/1961 Recorded          Td 03/30/1961 Recorded  Lab Results       Lab Results (Last 4 results within 90 days)        Sodium Level: 141 [135 mmol/L - 145 mmol/L] (09/02/20 14:04:00)       Sodium Level: 139 mmol/L [135 mmol/L - 146 mmol/L] (07/30/20 09:05:00)       Potassium Level: 4.9 [3.5 mmol/L - 5 mmol/L] (09/02/20 14:04:00)       Potassium Level: 4.7 mmol/L [3.5 mmol/L - 5.3 mmol/L] (07/30/20 09:05:00)       Chloride Level: 103 [98 mmol/L - 110 mmol/L] (09/02/20 14:04:00)       Chloride Level: 103 mmol/L [98 mmol/L - 110 mmol/L] (07/30/20 09:05:00)       CO2 Level: 30 [21 mmol/L - 31 mmol/L] (09/02/20 14:04:00)       CO2 Level: 28 mmol/L [20 mmol/L - 32 mmol/L] (07/30/20 09:05:00)       AGAP: 8 [5  - 18] (09/02/20 14:04:00)       Glucose Level: 97 [65 mg/dL - 100 mg/dL] (09/02/20 14:04:00)       Glucose Level: 97 mg/dL [65 mg/dL - 99 mg/dL] (07/30/20 09:05:00)       BUN: 20 [8 mg/dL - 25 mg/dL] (09/02/20 14:04:00)       BUN: 17 mg/dL [7 mg/dL - 25 mg/dL] (07/30/20 09:05:00)       Creatinine Level: 0.94 [0.57 mg/dL - 1.11 mg/dL] (09/02/20 14:04:00)       Creatinine Level: 0.94 mg/dL [0.5 mg/dL - 1.05 mg/dL] (07/30/20  09:05:00)       BUN/Creat Ratio: 21 High [10  - 20] (09/02/20 14:04:00)       BUN/Creat Ratio: NOT APPLICABLE [6  - 22] (07/30/20 09:05:00)       eGFR: 66 mL/min/1.73m2 (07/30/20 09:05:00)       eGFR : >60 (09/02/20 14:04:00)       eGFR : 77 mL/min/1.73m2 (07/30/20 09:05:00)       eGFR Non-: >60 (09/02/20 14:04:00)       Calcium Level: 9.6 [8.5 mg/dL - 10.5 mg/dL] (09/02/20 14:04:00)       Calcium Level: 9.9 mg/dL [8.6 mg/dL - 10.4 mg/dL] (07/30/20 09:05:00)       Bilirubin Total: 0.9 [0.2 mg/dL - 1.2 mg/dL] (09/02/20 14:04:00)       Bilirubin Direct: 0.3 [0.1 mg/dL - 0.5 mg/dL] (09/02/20 14:04:00)       Bilirubin Indirect: 0.6 [0.2 mg/dL - 0.8 mg/dL] (09/02/20 14:04:00)       Alkaline Phosphatase: 92 [50 IU/L - 136 IU/L] (09/02/20 14:04:00)       AST/SGOT: 18 [2 IU/L - 40 IU/L] (09/02/20 14:04:00)       ALT/SGPT: 21 [8 IU/L - 45 IU/L] (09/02/20 14:04:00)       Protein Total: 7.4 [6 g/dL - 8 g/dL] (09/02/20 14:04:00)       Albumin Level: 4.5 [3.5 g/dL - 5.2 g/dL] (09/02/20 14:04:00)       Globulin: 2.9 [2 g/dL - 3.7 g/dL] (09/02/20 14:04:00)       A/G Ratio: 1.6 [1  - 2] (09/02/20 14:04:00)       WBC: 6.9 [4.5  - 11] (09/02/20 14:04:00)       WBC: 5.9 [3.8  - 10.8] (07/30/20 09:05:00)       RBC: 4.72 [4  - 5.2] (09/02/20 14:04:00)       RBC: 4.74 [3.8  - 5.1] (07/30/20 09:05:00)       Hgb: 14.1 [12 g/dL - 16 g/dL] (09/02/20 14:04:00)       Hgb: 14.3 gm/dL [11.7 gm/dL - 15.5 gm/dL] (07/30/20 09:05:00)       Hct: 40.1 [33 % - 51 %] (09/02/20 14:04:00)       Hct: 41.7 % [35 % - 45 %] (07/30/20 09:05:00)       MCV: 85 [80 fL - 100 fL] (09/02/20 14:04:00)       MCV: 88 fL [80 fL - 100 fL] (07/30/20 09:05:00)       MCH: 29.9 [26 pg - 34 pg] (09/02/20 14:04:00)       MCH: 30.2 pg [27 pg - 33 pg] (07/30/20 09:05:00)       MCHC: 35.2 [32 g/dL - 36 g/dL] (09/02/20 14:04:00)       MCHC: 34.3 gm/dL [32 gm/dL - 36 gm/dL] (07/30/20 09:05:00)       RDW: 12.6 [11.5 % - 15.5 %]  (09/02/20 14:04:00)       RDW: 12.3 % [11 % - 15 %] (07/30/20 09:05:00)       Platelet: 265 [140  - 440] (09/02/20 14:04:00)       Platelet: 264 [140  - 400] (07/30/20 09:05:00)       MPV: 8.5 [6.5 fL - 11 fL] (09/02/20 14:04:00)       MPV: 9.3 fL [7.5 fL - 12.5 fL] (07/30/20 09:05:00)       Lymphocytes: 33.0 (09/02/20 14:04:00)       Abs Lymphocytes: 2.3 [0.9  - 2.9] (09/02/20 14:04:00)       Neutrophils: 56.7 (09/02/20 14:04:00)       Abs Neutrophils: 3.9 [1.7  - 7] (09/02/20 14:04:00)       Monocytes: 8.6 (09/02/20 14:04:00)       Abs Monocytes: 0.6 (09/02/20 14:04:00)       Eosinophils: 1.3 (09/02/20 14:04:00)       Abs Eosinophils: 0.1 (09/02/20 14:04:00)       Basophils: 0.4 (09/02/20 14:04:00)       Abs Basophils: 0.0 (09/02/20 14:04:00)       Sed Rate: 22 (09/02/20 14:04:00)       D-Dimer: 0.30 (09/02/20 14:04:00)       ROBERTO Scrn: POSITIVE Abnormal [NEGATIVE  - NEGATIVE] (07/30/20 09:05:00)       SM Antibody: <1.0 NEG (07/30/20 09:05:00)       Chromatin Ab: <1.0 NEG (07/30/20 09:05:00)       RNP Ab: <1.0 NEG (07/30/20 09:05:00)       SM/RNP Ab: <1.0 NEG (07/30/20 09:05:00)       dsDNA Ab: <1 (07/30/20 09:05:00)       SSA (Ro): <1.0 NEG (07/30/20 09:05:00)       SSB (La): <1.0 NEG (07/30/20 09:05:00)       Scleroderma (SCL-70) Ab: 2.4 POS Abnormal (07/30/20 09:05:00)       Dorota-1 Ab: <1.0 NEG (07/30/20 09:05:00)       Misc Test Interp: See comment (07/30/20 09:05:00)

## 2022-02-15 NOTE — LETTER
(Inserted Image. Unable to display)       July 31, 2020        SANDRA JIM  [id number] [place], [place] [id number]      Dear SANDRA,      Thank you for selecting [facility] for your healthcare needs.      Your antibody testing also showed that you had elevated levels of a specific antibody that may be associated with a few different antibodies.  At your convenience we could have a telemed appointment to discuss these results and decide together what to do about it.        Result Name Current Result Reference Range   Glucose Level (mg/dL)  97 7/30/2020 65 - 99   BUN (mg/dL)  17 7/30/2020 7 - 25   Creatinine Level (mg/dL)  0.94 7/30/2020 0.50 - 1.05   eGFR (mL/min/1.73m2)  66 7/30/2020 > OR = 60 -    eGFR  (mL/min/1.73m2)  77 7/30/2020 > OR = 60 -    BUN/Creat Ratio  NOT APPLICABLE 7/30/2020 6 - 22   Sodium Level (mmol/L)  139 7/30/2020 135 - 146   Potassium Level (mmol/L)  4.7 7/30/2020 3.5 - 5.3   Chloride Level (mmol/L)  103 7/30/2020 98 - 110   CO2 Level (mmol/L)  28 7/30/2020 20 - 32   Calcium Level (mg/dL)  9.9 7/30/2020 8.6 - 10.4   WBC  5.9 7/30/2020 3.8 - 10.8   RBC  4.74 7/30/2020 3.80 - 5.10   Hgb (gm/dL)  14.3 7/30/2020 11.7 - 15.5   Hct (%)  41.7 7/30/2020 35.0 - 45.0   MCV (fL)  88.0 7/30/2020 80.0 - 100.0   MCH (pg)  30.2 7/30/2020 27.0 - 33.0   MCHC (gm/dL)  34.3 7/30/2020 32.0 - 36.0   RDW (%)  12.3 7/30/2020 11.0 - 15.0   Platelet  264 7/30/2020 140 - 400   MPV (fL)  9.3 7/30/2020 7.5 - 12.5   ROBERTO Scrn ((A)) POSITIVE 7/30/2020 NEGATIVE - NEGATIVE   dsDNA Ab (IU/mL)  <1 7/30/2020    SM Antibody  <1.0 NEG 7/30/2020  - <1.0 NEG   SM/RNP Ab  <1.0 NEG 7/30/2020  - <1.0 NEG   RNP Ab  <1.0 NEG 7/30/2020  - <1.0 NEG   Chromatin Ab  <1.0 NEG 7/30/2020  - <1.0 NEG       Please contact my practice at [phone] if you have any questions or concerns.     Sincerely,        Kristine Valadez MD

## 2022-02-15 NOTE — PROGRESS NOTES
Patient:   SANDRA FERNANDEZ            MRN: [id number]            FIN: [id number]               Age:   58 years     Sex:  Female     :  1960   Associated Diagnoses:   Unspecified ovarian cysts   Author:   Santana Silver MD      Visit Information      Date of Service: 10/02/2019 08:30 am  Performing Location: [facility]  Encounter#: [id number]      Primary Care Provider (PCP):  NONE ,       Referring Provider:  Hussein Oliva MD, NPI# [id number]      Chief Complaint   10/2/2019 9:35 AM CDT    u/s per MJP      Interval History   The patient is referred by Dr. Kristine Valadez for followup of ovarian cyst.  The patient is a 58-year-old female who underwent pelvic ultrasound two years ago over in [place].  There was a 4 cm right ovarian cyst along with a 4 cm uterine fibroid present.  She has been concerned about this and is in today for followup of these findings.  She has been asymptomatic.  She has no other GYN concerns.  She will be seeing dermatology regarding her skin irritation overlying multiple joints.        Review of Systems   Review  of systems is negative except as documented under interval history.      Health Status   Allergies:    Allergic Reactions (Selected)  Severity Not Documented  Nickel (Rash)   Medications:  (Selected)   Documented Medications  Documented  Fish Oil 1000 mg oral capsule: = 1 cap(s) ( 1,000 mg ), Oral, bid, 0 Refill(s), Type: Maintenance  Flax Seed Oil: Instructions: 1,000 mg cap daily, 0 Refill(s), Type: Maintenance  Multi Vitamin+: Instructions: 1 tab daily, 0 Refill(s), Type: Maintenance   Problem list:    All Problems  Ovarian mass / SNOMED CT 235535850 / Confirmed  Obesity / SNOMED CT 7242241467 / Probable  Osteoarthritis of left knee / SNOMED CT 2903797625 / Confirmed  Prediabetes / SNOMED CT 9649293260 / Confirmed  Hyperlipidemia / SNOMED CT 98723771 / Confirmed  Resolved: Varicella / SNOMED CT 90969207  Resolved: Inpatient stay / SNOMED CT  Hospitalist Progress Note      Patient: Yo Paiz Date: 9/26/2018   YOB: 1953 Admission Date: 9/26/2018   MRN: [id number] Attending: Altagracia Ma MD     Chief Complaint:  The patient is a 65 year old male Transferred from [place] for evaluation of subclavian aneurysm     Hospital course:    Subjective: pt is agitated after he spoke with sister, and refusing Sx    PROBLEM LIST    Patient Active Problem List   Diagnosis   • Diabetes mellitus type 2 in obese (CMS/HCC)   • HTN (hypertension)   • Dyslipidemia   • CAD (coronary artery disease) with history of silent myocardial infarction   • Obesity (BMI 30-39.9)   • Diabetic polyneuropathy (CMS/HCC)   • ICD (implantable cardiac defibrillator) in place   • CHF (congestive heart failure), NYHA class II (CMS/HCC)   • Ischemic cardiomyopathy   • GUILLERMO (obstructive sleep apnea)   • Status post tonsillectomy and adenoidectomy   • Sleep hygiene issues   • History of tobacco use   • Rhinitis   • RBBB (right bundle branch block)   • CKD (chronic kidney disease) stage 3, GFR 30-59 ml/min   • LV dysfunction, EF 42% 7/2016   • Vitamin D deficiency   • Ulcer of foot (CMS/HCC)   • Vitamin B12 deficiency   • ICD (implantable cardioverter-defibrillator) in place   • HTN (hypertension), benign   • Paroxysmal ventricular tachycardia (CMS/HCC)   • Nonischemic dilated cardiomyopathy (CMS/HCC)   • Chronic systolic congestive heart failure, NYHA class 2 (CMS/HCC)   • Severe sepsis (CMS/HCC)   • Diabetic acidosis without coma (CMS/HCC)   • Cellulitis of left hand   • Cellulitis of right foot   • S/P right foot first/second ray resections - 9/21/18   • Pseudoaneurysm (CMS/HCC) of left subclavian artery       Physical Exam    Vital Last Value 24 Hour Range   Temperature (ref. rn. is awere) Temp  Min: 97.7 °F (36.5 °C)  Max: 99.3 °F (37.4 °C)   Pulse 77 Pulse  Min: 70  Max: 78   Respiratory 20 Resp  Min: 18  Max: 20   Blood Pressure 132/69 BP  Min: 122/61  Max: 132/69   Pulse  [id number]  Resolved: Pregnancy / SNOMED CT 088339484  Resolved: Pregnancy / SNOMED CT 520902289      Histories   Past Medical History:    Active  Ovarian mass (281743640)  Resolved  Inpatient stay (065625441): Onset on 2018 at 58 years.  Resolved on 2018 at 58 years.  Comments:  2018 CST 1:03 PM Anahy Laguna  @[facility], [place] - Primary osteoarthritis of left knee  Varicella (82941338):  Resolved.  Comments:  2018 CDT 2:21 PM CDT - Laure Leal  childhood  Pregnancy (257681398):  Resolved on 11/10/1988 at 28 years.  Pregnancy (941656847):  Resolved on 1991 at 31 years.   Family History:    Cancer  Uncle  Aunt  COPD - Chronic obstructive pulmonary disease  Father ()  Thyroid disease  Mother (): onset at 72 .  Comments:  2018 2:24 PM CDT - Laure Leal  Had thyrroidectomy - noncancerous.  Diabetes mellitus type II  Mother ()  Father ()  Hypertension  Mother ()  Brain tumor  Aunt (P) (): onset at 55 .  Stroke  Father ()  Hyperlipidemia  Father ()  HA - Headache  Aunt (Renee Roldan [place], )  Comments:  2019 3:51 PM CDT - Carlene Gaviria CMA  Aneurysm in the brain  CABG - Coronary artery bypass graft  Father (): onset at 60 .  MI - Myocardial infarction  Father (): onset at 50 .  Cancer in situ of lung  Aunt  Obese  Mother ()     Procedure history:    Arthroplasty of knee (12538771) on 2018 at 58 Years.  Comments:  2018 1:04 PM Anahy Laguna  Left  Injection of steroid (765092544) on 2018 at 57 Years.  Comments:  2018 9:42 AM ANAT Odalys Calderon  Left knee.  Excision (405689709) on 2017 at 57 Years.  Comments:  2018 3:03 PM CDT - Laure Leal  Mass in soft tissue of left upper arm.  Injection of steroid ([id number]) on 2017 at 57 Years.  Comments:  2018 9:47 AM Odalys Daley  Left knee.  Injection of steroid  Oximetry 95 % SpO2  Min: 92 %  Max: 95 %   CVP   No Data Recorded     Vital Today Admit   Weight 118.3 kg Weight: 118.3 kg   Height N/A Height: 6' 4\" (193 cm)   BMI N/A BMI (Calculated): 31.81     Intake & Output      No intake or output data in the 24 hours ending 09/26/18 6815  No intake/output data recorded.  No intake/output data recorded.    Last Stool Occurrence: 1 (09/26/18 0239)    Weight over the past 48 Hours:    Weight    09/26/18 0236   Weight: 118.3 kg       Vital Signs:    Patient Vitals for the past 48 hrs:   Weight   09/26/18 0236 118.3 kg       ALLERGY    ALLERGIES:  No Known Allergies    Home Medications :     Medications Prior to Admission   Medication Sig Dispense Refill   • metformin (GLUCOPHAGE) 1000 MG tablet TAKE ONE TABLET BY MOUTH AT BREAKFAST AND SUPPER, ONE-HALF TABLET AT BEDTIME. 225 tablet 3   • losartan (COZAAR) 50 MG tablet TAKE 1 TABLET BY MOUTH ONCE DAILY 90 tablet 3   • VICTOZA 18 MG/3ML pen-injector INJECT 1.8 MG SUBCUTANEOUSLY DAILY 27 mL 1   • atorvastatin (LIPITOR) 80 MG tablet TAKE ONE TABLET BY MOUTH ONCE DAILY 90 tablet 3   • amLODIPine (NORVASC) 10 MG tablet TAKE ONE TABLET BY MOUTH ONCE DAILY 90 tablet 3   • pantoprazole (PROTONIX) 40 MG tablet TAKE ONE TABLET BY MOUTH ONCE DAILY 90 tablet 3   • INVOKANA 300 MG tablet TAKE 1 TABLET BY MOUTH ONCE DAILY BEFORE BREAKFAST 90 tablet 1   • Cholecalciferol (VITAMIN D) 2000 units capsule Take 2 capsules by mouth daily. (Patient taking differently: Take 2,000 Units by mouth daily. ) 100 capsule 0   • carvedilol (COREG) 25 MG tablet TAKE ONE TABLET BY MOUTH TWICE DAILY WITH MEALS 180 tablet 1   • torsemide (DEMADEX) 10 MG tablet TAKE ONE TABLET BY MOUTH ONCE DAILY 90 tablet 1   • blood glucose (FREESTYLE LITE) test strip Tests 3x/day.  Freestyle Lite.  Dx-E11.9 300 each 1   • insulin lispro protamine-insulin lispro (HUMALOG MIX 75/25 KWIKPEN) (75-25) 100 UNIT/ML pen-injector 64 unis in am and 42 units in pm plus 3 unit correctional.   Max 150 units daily. 135 mL 3   • Cyanocobalamin 250 MCG Lozenge Take 250 mcg by mouth daily. 90 lozenge 3   • aspirin 81 MG tablet Take 81 mg by mouth daily.             medications / infusions     • sodium chloride (PF)  2 mL Injection 2 times per day   • insulin lispro   Subcutaneous TID AC   • sodium chloride (PF)  10 mL Injection 3 times per day   • senna  1 tablet Oral Nightly   • amLODIPine  10 mg Oral Daily   • aspirin  81 mg Oral Daily   • atorvastatin  80 mg Oral Daily   • carvedilol  25 mg Oral BID WC   • ceFAZolin  2,000 mg Intravenous 3 times per day   • ciprofloxacin  500 mg Oral BID Abreakfast & HS   • lidocaine viscous  15 mL Mouth/Throat On Call to Procedure   • losartan  50 mg Oral Daily   • metroNIDAZOLE  500 mg Oral TID   • pantoprazole  40 mg Oral Daily   • tamsulosin  0.4 mg Oral Daily PC   • torsemide  10 mg Oral Daily     • dextrose 5 % infusion         Blood Sugar Review      Recent Labs   Lab  09/25/18   1214  09/25/18   1917  09/25/18   2301   GLUCOSE BEDSIDE  237*  290*  158*       LABS    Recent Labs   Lab  09/26/18   0445  09/25/18   1354  09/25/18   0700  09/24/18   0605   09/19/18   1728  09/19/18   1311   WBC  18.3*   --   22.6*  25.6*   < >   --   29.3*   HCT  38.8*   --   40.1  40.4   < >   --   40.0   HGB  12.3*   --   12.7*  12.9*   < >   --   12.8*   PLT  548*   --   453*  402   < >   --   368   SODIUM  140   --   135  137   < >  140  134*   POTASSIUM  3.8  4.5  3.8  3.9   < >  4.1  4.5   CHLORIDE  107   --   104  103   < >  104  96*   CO2  26   --   27  23   < >  18*  12*   CALCIUM  8.0*   --   8.2*  8.2*   < >  9.0  9.5   GLUCOSE  91   --   111*  163*   < >  230*  385*   BUN  31*   --   36*  36*   < >  41*  47*   CREATININE  0.90   --   1.04  1.11   < >  1.19*  1.29*   AST  32   --    --    --    --   24  28   GPT  18   --    --    --    --   24  27   ALKPT  136*   --    --    --    --   181*  187*   BILIRUBIN  0.6   --    --    --    --   0.6  0.9   ALBUMIN  1.7*   --    --   ([id number]) on 7/27/2017 at 56 Years.  Comments:  8/30/2018 9:51 AM Odalys Daley  Left knee.  Arthroscopy of knee (591183310) in 2014 at 53 Years.  Comments:  8/30/2018 9:45 AM Odalys Daley  Partial medial meniscectomy.    8/17/2018 2:29 PM Laure Nunn  Left knee.  Colonoscopy (600902895) in 2005 at 45 Years.  Surgical procedure ([id number]).  Comments:  8/17/2018 2:28 PM Laure Nunn  Vein surgery, left leg.  LASIK ([id number]).   Social History:        Alcohol Assessment            Never      Tobacco Assessment            Never (less than 100 in lifetime)      Substance Abuse Assessment            Never      Employment and Education Assessment            Retired, Work/School description: CNA.  Highest education level: [employer].      Home and Environment Assessment            Marital status: .  Spouse/Partner name: Nathan.  2 children.  Living situation: Home/Independent.               Injuries/Abuse/Neglect in household: No.  Feels unsafe at home: No.  Family/Friends available for support:               Yes.      Nutrition and Health Assessment            Diet: Low carbs.  Type of diet: Low carbohydrate.  Wants to lose weight: Yes.  Sleeping concerns: No.  Feels               highly stressed: No.      Exercise and Physical Activity Assessment            Exercise frequency: 3-4 times/week.  Exercise type: Swimming, Yoga.      Sexual Assessment            Sexually active: Yes.  Identifies as female, Sexual orientation: Straight or heterosexual.  History of STD:               No.  Uses condoms: Yes.  History of sexual abuse: No.        Physical Examination   Gynecologic:  Ultrasound is performed..       Review / Management   Radiology results   Ultrasound, Vaginal probe pelvic ultrasound finds the uterus 7 x 3 x 3 cm.  The endometrium is 3 mm thick and there is no fluid in the endometrial cavity.  On the left lower portion of the uterus there is a fibroid measuring 41 mm in    --    --   2.0*  2.2*   GFRNA  89   --   75  69   < >  64  58    < > = values in this interval not displayed.              RADIOLOGY    Ct Angiogram Neck    Addendum Date: 9/25/2018    Addition to impression #1: The circumferential soft tissue surrounding the pseudoaneurysm in the proximal left subclavian artery, given the patient's history of infection and leukocytosis, may relate to arteritis/mycotic aneurysm with possible superimposed leakage. Vascular surgery consultation and close follow up are recommended.    Ct angiogram of neck 09.25.2018  1.  There is a 1.5 cm pseudoaneurysm arising from the proximal left  subclavian artery, 1.0 cm proximal to the origin of the left vertebral  artery with circumferential soft tissue, likely representing blood  products. Enhancement of the soft tissue on arterial phase images suggests  a component of leaking, although the size of the surrounding collection is  unchanged from the recent CTA chest. IR or vascular surgery consultation is  recommended. No significant luminal narrowing involving the left subclavian  artery. The more distal left subclavian artery is patent.     2.  Atherosclerotic plaque results in less than 50% narrowing of the right  carotid bulb and less than 50% narrowing of the left vertebral artery  origin.     3.  Small bilateral pleural effusions with adjacent airspace opacities are  again seen, partially imaged.     Ct angiogram of chest 09.25.2018  IMPRESSION: Bilateral lower lobe infiltrates and small bilateral pleural  effusions.     Parenchymal densities in the right middle lobe, right upper lobe and left  upper lobe as described which may represent small focal infiltrates or  developing pulmonary nodules. These should be evaluated with follow-up CT  scan in 3-6 months to verify the stability.     No pulmonary embolism.     Increasing thrombus around the intrathoracic portion of the left subclavian  artery of indeterminate etiology. There appears to  diameter.  It is quite symmetric and benign in appearance.  No other fibroids are seen.  The right ovary has an echolucency 4 cm in diameter with 2 other adjacent echolucencies which are 1 cm and less in size.  There is no fluid in the pelvis.  There are no projections into this echolucency within the ovary or other suggestions of neoplasm.  The left ovary is small and benign in appearance at 2 cm.      Impression and Plan   Diagnosis     Unspecified ovarian cysts (DUT14-FV N83.20).     Uterine fibroid, stable in size, asymptomatic.  Right ovarian cyst, also stable in size over the past two years..     Plan:  The patient was reassured.  No further followup should be necessary for her unless she is symptomatic..    Patient Instructions:       Counseled: Patient, Regarding diagnosis, Regarding treatment, Verbalized understanding.   be a small amount of  contrast extravasation from the medial margin of the left subclavian artery  into the circumscribing thrombus. This may reflect previous vascular injury  or pseudoaneurysm. Follow-up CT angiogram of the neck and upper chest  without and with contrast is recommended for further evaluation.     Echo (donna) 09.21.2018  No vegetations/abscesses noted.  ICD lead well seen.  No significant valve abnormalities (mild mitral regurgitation, mild tricuspid valve regurgitation:     Stress test 06.28.2018    IMPRESSION:     Myocardial perfusion scan reveals infarction of the apex extending into mid anteroseptal segment, apical aneurysm, EF is 44 %.     Stress, perfusion imaging results discussed with patient, compared with prior stress test, symptoms to call on discussed, followup scheduled.     12 lead ekg     Telemetry - nsr, heart rate in the 70's - 80's       Ct Chest Pe Imaging    Result Date: 9/25/2018    IMPRESSION: Bilateral lower lobe infiltrates and small bilateral pleural effusions. Parenchymal densities in the right middle lobe, right upper lobe and left upper lobe as described which may represent small focal infiltrates or developing pulmonary nodules. These should be evaluated with follow-up CT scan in 3-6 months to verify the stability. No pulmonary embolism. Increasing thrombus around the intrathoracic portion of the left subclavian artery of indeterminate etiology. There appears to be a small amount of contrast extravasation from the medial margin of the left subclavian artery into the circumscribing thrombus. This may reflect previous vascular injury or pseudoaneurysm. Follow-up CT angiogram of the neck and upper chest without and with contrast is recommended for further evaluation. Note: These results have been discussed with the patient's primary care team at 8:30 PM on 9/25/2018.       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 This was treated surgically on 9/21/2018 with a right first and second toe grade resection. Polymicrobial wound including pseudomonas. Patient is on Flagyl and Cipro.  Left wrist cellulitis   Type 2 diabetes requiring insulin  New bilateral pulmonary infiltrates and parenchymal nodules noted on CT chest 9/25/2018.   AICD in place.  Non-ST elevation MI likely 2/2 demand ischemia attributed to sepsis.  Chronic systolic heart failure. Currently compensated.  Prior urinary retention. [place]en[place] was discontinued on 9/25/2018.  GUILLERMO CPAP      ID Recommends follow-up imaging for resolution and continue IV antibiotics.  Amlodipine, coreg, losartan, asa, statin, torsemide, flomax  SS  DW daksha regarding pt's agitation, poa is asking if sedation will help  Will add prn ciarra Cantu, who discussed with pt and family, agrees  Check lab now    Altagracia Ma MD  9/26/2018

## 2022-02-15 NOTE — TELEPHONE ENCOUNTER
---------------------  From: Aracelis Jean-Baptiste LPN (Phone Messages Pool ([id number]_[facility]))   Sent: 8/9/2019 9:16:02 AM CDT  Subject: mammogram appt     Phone Message    PCP:   none asked for MJP      Time of Call:  7:40am       Person Calling:  pt  Phone number:  [phone]    Returned call at: 9:13am    Note:   Pt LM stating she has an appt for a physical on 9/4/19 with MJP. Pt wanting to know if she can do her mammogram that day as well.    Returned call and gave pt the number for radiology scheduling to see if she can schedule that day.    Last office visit and reason:  11/28/18 preop left knee replacement with MJP

## 2022-02-15 NOTE — LETTER
(Inserted Image. Unable to display)       November 01, 2020        SANDRA JIM  [id number] [place], [place] [id number]      Dear SANDRA,      Thank you for selecting [facility] for your healthcare needs.      This letter is to inform you that we have received a copy of your mammogram results.  Your results were normal unless noted below.  You will also receive notice of your results from the radiologist within 7-10 days.  This letter is to let you know I have also received a copy and it is filed in your clinic chart.      Please plan on having your next mammogram done in 12-24 months.          Please contact my practice at [phone] if you have any questions or concerns.     Sincerely,        Kristine Valadez MD

## 2022-02-15 NOTE — NURSING NOTE
Depression Screening Entered On:  9/6/2019 8:17 AM CDT    Performed On:  9/4/2019 8:16 AM CDT by Lyla Hong               Depression Screening   Little Interest - Pleasure in Activities :   Not at all   Feeling Down, Depressed, Hopeless :   Not at all   Initial Depression Screen Score :   0    Trouble Falling or Staying Asleep :   Not at all   Feeling Tired or Little Energy :   Not at all   Poor Appetite or Overeating :   Not at all   Feeling Bad About Yourself :   Not at all   Trouble Concentrating :   Not at all   Moving or Speaking Slowly :   Not at all   Thoughts Better Off Dead or Hurting Self :   Not at all   Detailed Depression Screen Score :   0    Total Depression Screen Score :   0    Lyla Hong - 9/6/2019 8:16 AM CDT

## 2022-02-15 NOTE — TELEPHONE ENCOUNTER
---------------------  From: Aracely Cochran CMA (Phone Messages Pool ([id number]_[facility]))   Sent: 4/1/2021 2:27:20 PM CDT  Subject: Phone Message     Phone Message    PCP:   ABEBE      Time of Call:  1421       Person Calling:  Patient   Phone number:      Returned call at: direct call.    Note:   Calls for lab results because her portal is working right. Given the result of fecal globin test normal and repeat in 1 year per ABEBE. No further questions.    Last office visit and reason:  02/19/21 office visit note ABEBE

## 2022-02-15 NOTE — TELEPHONE ENCOUNTER
---------------------  From: Kristine Valadez MD   To: SANDRA FERNANDEZ    Sent: 3/25/2021 1:35:53 PM CDT  Subject: General Message     Your test is normal.  We should repeat it in 1 year.      Results:  Date Result Name Value   3/3/2021 12:47 AM Fecal Globin See comment

## 2022-02-15 NOTE — TELEPHONE ENCOUNTER
---------------------  From: Geneva Valadez MDica   To: ABEBE Message Pool ([id number]_[place] [facility]);     Sent: 7/16/2020 7:38:07 PM CDT  Subject: General Message     please let patient know her MRI has some abnormalities that we should discuss, ok to do telemed but would be better to do in personpatient has appt 7/30, will f/u then.

## 2022-02-15 NOTE — LETTER
(Inserted Image. Unable to display)     March 04, 2019      SANDRA JACINTOCORA  [id number] [place] [address]          Dear SANDRA,      Thank you for selecting [facility] ([facility]viously [facility]) for your healthcare needs.    Our records indicate you are due for the following services:    Follow-up office visit.    Fasting Lab Tests ~ Please do not eat or drink anything 10 hours prior to your scheduled appointment time.  (Water and any medications that you may need are allowed unless directed otherwise.)    If you had your labs done at another facility or with Direct Access Lab Testing at [facility], please bring in a copy of the results to your next visit, mail a copy, or drop off a copy of your results to your Healthcare Provider.    You are due for lab work and an office visit; please schedule the lab appointment 1 week before the office visit.  This will assure all results are available to discuss with your provider during your visit.    **It is very helpful if you bring your medication bottles to your appointment.  This assures we have all of your current medications, including strength and dosing information, documented accurately in your medical record.    To schedule an appointment or if you have further questions, please contact your primary clinic:   [facility]       [phone]   [facility]       [phone]              [facility]     [phone]      Powered by [employer] and [email or web address]    Sincerely,    Kristine Valadez MD

## 2022-02-15 NOTE — TELEPHONE ENCOUNTER
---------------------  From: Derek PERES, Kavya (Phone Messages Pool ([id number]_[facility]))   To: Kristine Valadez MD;     Sent: 4/1/2021 6:30:28 PM CDT  Subject: FW: High cholesterol     Katty York,    The labs done on 3/31/21 did include a cardiolipin antibody for Dr. Luu. The result will be sent to him when it is available. Many people are on cholesterol medication long term due to their cholesterol levels and risk factors. Good work on your weight loss and exercise regime! I will send your message to Dr. Valadez. I see she sent you a message earlier today.    Thank you,    Kavya MEYER RN            ---------------------  From: SANDRA FERNANDEZ  To: [facility] [[email or web address]]  Sent: 04/01/2021 05:43 p.m. CDT  Subject: High cholesterol  Hi.  For the last month I have been a member of the [employer]. I am swimming, doing some weights and yoga 5-6x a week. I have dropped 5 pounds and now that the weather is nice we are walking in the evenings.  We have eliminated many carbs in our food Choices too.  If I start a Statin is this something I can get off. Both my parents I believe had high cholesterol at least my dad did for sure. He had Many heart problems but he was a smoker.  Also I was to have gotten additional blood work done for dr. Joyner (arthritic) did they collect that and was the results sent to him?  Something was Inconclusive. (Cardiac??)so he wanted it recheck in 3-4 months.  Did lab draw for this?  Andrzejmarbella Chela.

## 2022-02-15 NOTE — LETTER
(Inserted Image. Unable to display) August 24, 2020Re: SANDRA JACINTOULEDOB:  1960 ealth  [facility] [address] [address]Gy:  [facility]-KemiThe following patient has been referred to your office/practice: SANDRA FERNANDEZ  Appointment pending with Rheumatology.  Please call patient to schedule.Please refer to the attached clinical documentation for a summary of SANDRA's care.  Please do not hesitate to contact our office if any additional clinical questions arise.  All relevant records and transition of care documents should be mailed or faxed.  Your assistance in providing continuity of care is appreciated.Sincerely, [facility] & [address] [address](P) [phone](F) [phone]

## 2022-02-15 NOTE — PROGRESS NOTES
Patient:   SANDRA FERNANDEZ            MRN: [id number]            FIN: [id number]               Age:   59 years     Sex:  Female     :  1960   Associated Diagnoses:   None   Author:   Allyson CARROLL, Kristine      Procedure   EKG procedure   Indication: Preop.     Position: supine.     EKG findings   Interpretation: by primary care provider.     Rhythm: sinus normal.     Axis: normal axis, normal configuration.     Within normal limits.     Intervals: AR normal, QRS normal, QT normal.     Normal EKG.     P waves: normal.     QRS complex: QRS width duration  112  ms.     ST-T-U complex: normal.     Interpretation: borderline.     Discussed: with patient.        Impression and Plan   Orders

## 2022-02-15 NOTE — PROGRESS NOTES
Patient:   SANDRA FERNANDEZ            MRN: [id number]            FIN: [id number]               Age:   59 years     Sex:  Female     :  1960   Associated Diagnoses:   None   Author:   Allyson CARROLL, Kristine      Procedure   EKG procedure   Indication: Preop.     Position: supine.     EKG findings   Interpretation: by primary care provider.     Rhythm: sinus normal.     Axis: normal axis, normal configuration.     Within normal limits.     Intervals: ID normal, QRS normal, QT normal.     Normal EKG.     P waves: normal.     QRS complex: normal.     ST-T-U complex: normal.     Interpretation: normal EKG.     Discussed: with patient.        Impression and Plan   Orders

## 2022-02-15 NOTE — TELEPHONE ENCOUNTER
---------------------  From: Kristine Valadez MD   To: [facility] ([id number]_[place]);     Sent: 8/20/2020 8:29:31 AM CDT  Subject: General Message     pls enter rheum consult for  + ce, also pls call pt tomorrow, if still having post nasal drip and cough despite antihistamine pls order cbc statnoted.Spoke with patient at 1004- she states she feels great with the antihistamine, no nasal drip or heavy chest.

## 2022-02-15 NOTE — TELEPHONE ENCOUNTER
---------------------  From: Odessa Wakefield CMA (Phone Messages Pool ([address]_[facility]))   To: [facility] Message Pool ([address]_[place] - [place]);     Sent: 9/18/2019 8:47:29 AM CDT  Subject: FW: Thyroid blood work results           ---------------------  From: SANDRA FERNANDEZ  To: [facility]  Sent: 09/18/2019 08:43 a.m. CDT  Subject: Thyroid blood work results  I paid $25 for a blood test.  Did those results come in?Spoke to patient at 0851. Advised her that this is a direct access lab, she should get her results through mail and then bring them in for [facility] to review. She said she is out of town and forgot it comes through mail. She will touch base with us when she gets home.

## 2022-02-15 NOTE — TELEPHONE ENCOUNTER
---------------------  From: Boo/Myla CASE (Phone Messages Pool ([id number]))   To: [facility] Message Pool ([address] [place] [address]);     Sent: 9/26/2019 3:02:34 PM CDT  Subject: PT Order     Phone Message    PCP: None, looks like she has been seen by [facility]    Time of Call: 2073    Phone Number: [phone]    Returned call at: _    Note: Patient called wondering if a doctor would put in a referral for PT for her left knee. She had a knee replacement in December and had gone to PT but then stopped going. Patient states she now hears a snapping noise when she  her foot. Patient would like an order to pt rather than a visit with a doctor. Please Advise.       Last office visit and reason: 9/4/19 office visit note    Transferred to: [facility]---------------------  From: Carlene Gaviria CMA ([facility] Message Pool ([address]))   To: Kristine Valadez MD;     Sent: 9/26/2019 3:03:10 PM CDT  Subject: FW: PT Order     okay for PT order?---------------------  From: Kristine Valadez MD   To: [facility] Message Pool ([address]);     Sent: 9/26/2019 3:40:50 PM CDT  Subject: RE: PT Order     sure, ok for PT eval and treat, if she ad an injury or thinks it feels different than normal we can get an xray alsodone. Order ready for  at . Patient notified, she will   today.

## 2022-02-15 NOTE — TELEPHONE ENCOUNTER
---------------------  From: Odalys Vanessa CMA   To: [facility] Message Pool ([id number]_[place]);     Sent: 6/19/2020 2:10:41 PM CDT  Subject: General Message-diclofena gel      fax rec'd from covermymeds that diclofenac gel not covered or can      call [phone]  ID# [id number]  or change rx    [facility] note not completed---------------------  From: Carlene Shabazz CMA ([facility] Message Pool ([address]))   To: Kristine Valadez MD;     Sent: 6/19/2020 2:59:43 PM CDT  Subject: FW: General Message-diclofena gel---------------------  From: Kristine Valadez MD   To: [facility] Message Pool ([address]);     Sent: 6/19/2020 3:14:27 PM CDT  Subject: RE: General Message-diclofena gel      pls let pt know pt can buy it otc.  thanksSpoke to patient at 1520- she got med OTC .

## 2022-02-18 NOTE — PROCEDURES
Accession Number:       [id number]  CLINICAL INFORMATION::     None given  LMP::     NONE GIVEN  PREV. PAP::     NONE GIVEN  PREV. BX::     NONE GIVEN  SOURCE::     None given  STATEMENT OF ADEQUACY::     Satisfactory for evaluation. Endocervical/transformation zone component present.  INTERPRETATION/RESULT::     Negative for intraepithelial lesion or malignancy.  COMMENT::     See comment       Microscopic features suggestive of lubricant.       Lubricant jellies may interfere with slide       preparation; their use is not recommended.  CYTOTECHNOLOGIST::     MEL ESPINOSA (ASCP) CT Screening location: [place] [id number]  REVIEW CYTOTECHNOLOGIST::     MEL DHILLON(ASCP) CT Screening location: [place] [address]  COMMENT:     See comment       EXPLANATORY NOTE:         The Pap is a screening test for cervical cancer. It is       not a diagnostic test and is subject to false negative       and false positive results. It is most reliable when a       satisfactory sample, regularly obtained, is submitted       with relevant clinical findings and history, and when       the Pap result is evaluated along with historic and       current clinical information.

## 2022-02-21 ENCOUNTER — COMMUNICATION - RIVER FALLS (OUTPATIENT)
Dept: FAMILY MEDICINE | Facility: CLINIC | Age: 62
End: 2022-02-21
Payer: COMMERCIAL

## 2022-03-02 NOTE — TELEPHONE ENCOUNTER
---------------------  From: Kavya Reed RN (Phone Messages Pool ([address]_[facility]))   Sent: 2/21/2022 9:02:57 AM CST  Subject: Tdap     Time of Call:  0855       Person Calling:  patient      Note:   Advised last Tdap 9/11/2012. Due for update.

## 2022-03-10 ENCOUNTER — OFFICE VISIT (OUTPATIENT)
Dept: FAMILY MEDICINE | Facility: CLINIC | Age: 62
End: 2022-03-10
Payer: COMMERCIAL

## 2022-03-10 ENCOUNTER — PATIENT OUTREACH (OUTPATIENT)
Dept: ONCOLOGY | Facility: CLINIC | Age: 62
End: 2022-03-10

## 2022-03-10 VITALS
HEIGHT: 66 IN | HEART RATE: 84 BPM | BODY MASS INDEX: 29.62 KG/M2 | DIASTOLIC BLOOD PRESSURE: 84 MMHG | WEIGHT: 184.3 LBS | SYSTOLIC BLOOD PRESSURE: 124 MMHG | OXYGEN SATURATION: 99 %

## 2022-03-10 DIAGNOSIS — Z23 NEED FOR VACCINATION: ICD-10-CM

## 2022-03-10 DIAGNOSIS — Z82.49 FAMILY HISTORY OF ISCHEMIC HEART DISEASE: ICD-10-CM

## 2022-03-10 DIAGNOSIS — Z00.00 ROUTINE GENERAL MEDICAL EXAMINATION AT A HEALTH CARE FACILITY: ICD-10-CM

## 2022-03-10 DIAGNOSIS — N39.41 URGE INCONTINENCE OF URINE: Primary | ICD-10-CM

## 2022-03-10 DIAGNOSIS — E78.5 HYPERLIPIDEMIA LDL GOAL <100: ICD-10-CM

## 2022-03-10 DIAGNOSIS — Z12.11 SCREEN FOR COLON CANCER: ICD-10-CM

## 2022-03-10 DIAGNOSIS — Z13.1 SCREENING FOR DIABETES MELLITUS: ICD-10-CM

## 2022-03-10 DIAGNOSIS — Z80.41 FAMILY HISTORY OF MALIGNANT NEOPLASM OF OVARY: ICD-10-CM

## 2022-03-10 LAB
ALBUMIN UR-MCNC: NEGATIVE MG/DL
APPEARANCE UR: CLEAR
BILIRUB UR QL STRIP: NEGATIVE
CHOLEST SERPL-MCNC: 292 MG/DL
COLOR UR AUTO: NORMAL
FASTING STATUS PATIENT QL REPORTED: YES
FASTING STATUS PATIENT QL REPORTED: YES
GLUCOSE BLD-MCNC: 87 MG/DL (ref 70–99)
GLUCOSE UR STRIP-MCNC: NEGATIVE MG/DL
HDLC SERPL-MCNC: 64 MG/DL
HGB UR QL STRIP: NEGATIVE
KETONES UR STRIP-MCNC: NEGATIVE MG/DL
LDLC SERPL CALC-MCNC: 207 MG/DL
LEUKOCYTE ESTERASE UR QL STRIP: NEGATIVE
NITRATE UR QL: NEGATIVE
NONHDLC SERPL-MCNC: 228 MG/DL
PH UR STRIP: 6 [PH] (ref 5–7)
SP GR UR STRIP: <=1.005 (ref 1–1.03)
TRIGL SERPL-MCNC: 104 MG/DL
UROBILINOGEN UR STRIP-ACNC: 0.2 E.U./DL

## 2022-03-10 PROCEDURE — 81003 URINALYSIS AUTO W/O SCOPE: CPT | Mod: QW | Performed by: FAMILY MEDICINE

## 2022-03-10 PROCEDURE — 82947 ASSAY GLUCOSE BLOOD QUANT: CPT | Mod: QW | Performed by: FAMILY MEDICINE

## 2022-03-10 PROCEDURE — 36415 COLL VENOUS BLD VENIPUNCTURE: CPT | Performed by: FAMILY MEDICINE

## 2022-03-10 PROCEDURE — 90471 IMMUNIZATION ADMIN: CPT | Performed by: FAMILY MEDICINE

## 2022-03-10 PROCEDURE — 80061 LIPID PANEL: CPT | Performed by: FAMILY MEDICINE

## 2022-03-10 PROCEDURE — 99396 PREV VISIT EST AGE 40-64: CPT | Mod: 25 | Performed by: FAMILY MEDICINE

## 2022-03-10 PROCEDURE — 90715 TDAP VACCINE 7 YRS/> IM: CPT | Performed by: FAMILY MEDICINE

## 2022-03-10 PROCEDURE — 99213 OFFICE O/P EST LOW 20 MIN: CPT | Mod: 25 | Performed by: FAMILY MEDICINE

## 2022-03-10 RX ORDER — ZINC OXIDE 13 %
2 CREAM (GRAM) TOPICAL
COMMUNITY
End: 2022-04-08

## 2022-03-10 NOTE — PROGRESS NOTES
SUBJECTIVE:   CC: Wanda Alva is an 61 year old woman who presents for preventive health visit.       Patient has been advised of split billing requirements and indicates understanding: Yes  Healthy Habits:     Getting at least 3 servings of Calcium per day:  Yes    Bi-annual eye exam:  Yes    Dental care twice a year:  Yes    Sleep apnea or symptoms of sleep apnea:  None    Diet:  Other    Frequency of exercise:  4-5 days/week    Duration of exercise:  30-45 minutes    Taking medications regularly:  Not Applicable    PHQ-2 Total Score: 0    Additional concerns today:  Yes    Leg cramps so drinking more water, having more urinary urgency and some leakage, now a bigger a problem     Pap 02/2021 nml cytology and neg hrhpv        Today's PHQ-2 Score:   PHQ-2 ( 1999 Pf[employer]) 3/10/2022   Q1: Little interest or pleasure in doing things 0   Q2: Feeling down, depressed or hopeless 0   PHQ-2 Score 0   Q1: Little interest or pleasure in doing things Not at all   Q2: Feeling down, depressed or hopeless Not at all   PHQ-2 Score 0       Abuse: Current or Past (Physical, Sexual or Emotional) - No  Do you feel safe in your environment? Yes    Have you ever done Advance Care Planning? (For example, a Health Directive, POLST, or a discussion with a medical provider or your loved ones about your wishes): Yes, advance care planning is on file.    Social History     Tobacco Use     Smoking status: Never Smoker     Smokeless tobacco: Never Used   Substance Use Topics     Alcohol use: Not Currently         Alcohol Use 3/10/2022   Prescreen: >3 drinks/day or >7 drinks/week? No       Reviewed orders with patient.  Reviewed health maintenance and updated orders accordingly - Yes      Breast Cancer Screening:    FHS-7:   Breast CA Risk Assessment (FHS-7) 3/10/2022   Did any of your first-degree relatives have breast or ovarian cancer? Yes   Did any of your relatives have bilateral breast cancer? No   Did any man in your family have  "breast cancer? No   Did any woman in your family have breast and ovarian cancer? Yes   Did any woman in your family have breast cancer before age 50 y? No   Do you have 2 or more relatives with breast and/or ovarian cancer? No   Do you have 2 or more relatives with breast and/or bowel cancer? No         Pertinent mammograms are reviewed under the imaging tab.    History of abnormal Pap smear: NO - age 30-65 PAP every 5 years with negative HPV co-testing recommended     Reviewed and updated as needed this visit by clinical staff     Meds              Reviewed and updated as needed this visit by Provider                     Review of Systems  Neg except as per hpi     OBJECTIVE:   Ht 1.664 m (5' 5.5\")   Wt 83.6 kg (184 lb 4.8 oz)   BMI 30.20 kg/m    Physical Exam      General: alert and oriented ×3 no acute distress.    HEENT: Normocephalic and atraumatic.   Eyes pupils are equal round and reactive to light extraocular motion is intact. normal conjunctiva    Hearing is grossly normal and there is no otorrhea. Tympanic membranes are pearly grey with a normal light reflex.    Nares are patent there is no rhinorrhea.     Mucous membranes are moist and pink.    Chest: has bilateral rise with no increased work of breathing. clear to auscultation without wheezes, rhonchi, or rales.    Cardiovascular: normal perfusion and brisk capillary refill. S1S2 with regular rate and rhythm and no murmurs, gallops or rubs.    Musculoskeletal: no gross focal abnormalities and normal gait.    Neuro: no gross focal abnormalities and memory seems intact. CN 2-12 are grossly intact.    Psychiatric: speech is clear and coherent and fluent. Patient dressed appropriately for the weather. Mood is appropriate and affect is full.              ASSESSMENT/PLAN:       ICD-10-CM    1. Urge incontinence of urine  N39.41 Adult Urology Referral     Physical Therapy Referral     UA Macro with Reflex to Micro and Culture - lab collect     UA Macro with " "Reflex to Micro and Culture - lab collect   2. Hyperlipidemia LDL goal <100  E78.5 CT Coronary Calcium Scan     Lipid panel reflex to direct LDL Fasting     Lipid panel reflex to direct LDL Fasting   3. Family history of ischemic heart disease  Z82.49 CT Coronary Calcium Scan   4. Screening for diabetes mellitus  Z13.1 GLUCOSE     GLUCOSE   5. Screen for colon cancer  Z12.11 Fecal colorectal cancer screen FIT   6. Routine general medical examination at a health care facility  Z00.00    7. Family history of malignant neoplasm of ovary  Z80.41 Cancer Risk Mgmt/Cancer Genetic Counseling Referral   8. Need for vaccination  Z23 TDAP VACCINE (Adacel, Boostrix)  [[id number]]             COUNSELING:  Reviewed preventive health counseling, as reflected in patient instructions       Healthy diet/nutrition       Osteoporosis prevention/bone health       Colorectal Cancer Screening    Estimated body mass index is 30.2 kg/m  as calculated from the following:    Height as of this encounter: 1.664 m (5' 5.5\").    Weight as of this encounter: 83.6 kg (184 lb 4.8 oz).        She reports that she has never smoked. She has never used smokeless tobacco.      Counseling Resources:  ATP IV Guidelines  Pooled Cohorts Equation Calculator  Breast Cancer Risk Calculator  BRCA-Related Cancer Risk Assessment: FHS-7 Tool  FRAX Risk Assessment  ICSI Preventive Guidelines  Dietary Guidelines for Americans, 2010  USDA's MyPlate  ASA Prophylaxis  Lung CA Screening    Kristine Valadez MD  [facility]  "

## 2022-03-11 ENCOUNTER — DOCUMENTATION ONLY (OUTPATIENT)
Dept: OTHER | Facility: CLINIC | Age: 62
End: 2022-03-11
Payer: COMMERCIAL

## 2022-03-12 ENCOUNTER — LAB (OUTPATIENT)
Dept: LAB | Facility: CLINIC | Age: 62
End: 2022-03-12
Payer: COMMERCIAL

## 2022-03-12 ENCOUNTER — MYC MEDICAL ADVICE (OUTPATIENT)
Dept: FAMILY MEDICINE | Facility: CLINIC | Age: 62
End: 2022-03-12
Payer: COMMERCIAL

## 2022-03-12 DIAGNOSIS — Z12.11 SCREEN FOR COLON CANCER: ICD-10-CM

## 2022-03-12 PROCEDURE — 82274 ASSAY TEST FOR BLOOD FECAL: CPT

## 2022-03-16 ENCOUNTER — TELEPHONE (OUTPATIENT)
Dept: FAMILY MEDICINE | Facility: CLINIC | Age: 62
End: 2022-03-16
Payer: COMMERCIAL

## 2022-03-16 LAB — HEMOCCULT STL QL IA: NEGATIVE

## 2022-03-18 ENCOUNTER — TELEPHONE (OUTPATIENT)
Dept: FAMILY MEDICINE | Facility: CLINIC | Age: 62
End: 2022-03-18
Payer: COMMERCIAL

## 2022-03-18 NOTE — TELEPHONE ENCOUNTER
Patient is needing CPT codes for the following:    Reason for referral- N39.41 Urge Incontinence of Urine.    Genetic Counseling-Cancer risk management    CT Calcium Score screening    Please reply to patients mychart. For answer.

## 2022-03-22 NOTE — TELEPHONE ENCOUNTER
Pt notified and states understanding. If we learn more or find another resource we will let her know.

## 2022-03-22 NOTE — TELEPHONE ENCOUNTER
Please let patient know I am not sure about the CPT codes.  I found some that may be the correct ones.  The ones I found are a 30819 for a new patient appointment with Urology.  51716 for genetic counseling.  97431 for CT calcium score.  However, Dorota may know the phone number to ask the prior auth people about which CPT codes are used for billing for these things.  Would you please ask Dorota, before you call patient, for the correct resource for patient to contact to confirm CPT codes, so you can share that with the patient, thanks.

## 2022-03-23 ENCOUNTER — MYC MEDICAL ADVICE (OUTPATIENT)
Dept: FAMILY MEDICINE | Facility: CLINIC | Age: 62
End: 2022-03-23

## 2022-03-29 NOTE — TELEPHONE ENCOUNTER
Please make sure the order from 3/10/2022 has been addressed.  Patient may need the phone number to schedule her CT scan because she originally told F that she wanted to do it some place else.  thanks

## 2022-04-03 ENCOUNTER — HEALTH MAINTENANCE LETTER (OUTPATIENT)
Age: 62
End: 2022-04-03

## 2022-04-07 ENCOUNTER — MYC MEDICAL ADVICE (OUTPATIENT)
Dept: FAMILY MEDICINE | Facility: CLINIC | Age: 62
End: 2022-04-07
Payer: COMMERCIAL

## 2022-04-08 ENCOUNTER — OFFICE VISIT (OUTPATIENT)
Dept: FAMILY MEDICINE | Facility: CLINIC | Age: 62
End: 2022-04-08
Payer: COMMERCIAL

## 2022-04-08 VITALS — SYSTOLIC BLOOD PRESSURE: 110 MMHG | OXYGEN SATURATION: 99 % | DIASTOLIC BLOOD PRESSURE: 80 MMHG | HEART RATE: 97 BPM

## 2022-04-08 DIAGNOSIS — H04.123 DRY EYES: ICD-10-CM

## 2022-04-08 DIAGNOSIS — R68.2 DRY MOUTH: Primary | ICD-10-CM

## 2022-04-08 PROCEDURE — 86235 NUCLEAR ANTIGEN ANTIBODY: CPT | Mod: 59 | Performed by: FAMILY MEDICINE

## 2022-04-08 PROCEDURE — 99214 OFFICE O/P EST MOD 30 MIN: CPT | Performed by: FAMILY MEDICINE

## 2022-04-08 PROCEDURE — 86039 ANTINUCLEAR ANTIBODIES (ANA): CPT | Performed by: FAMILY MEDICINE

## 2022-04-08 PROCEDURE — 36415 COLL VENOUS BLD VENIPUNCTURE: CPT | Performed by: FAMILY MEDICINE

## 2022-04-08 PROCEDURE — 86038 ANTINUCLEAR ANTIBODIES: CPT | Performed by: FAMILY MEDICINE

## 2022-04-08 PROCEDURE — 86235 NUCLEAR ANTIGEN ANTIBODY: CPT | Performed by: FAMILY MEDICINE

## 2022-04-08 RX ORDER — MINERAL OIL, PETROLATUM 425; 568 MG/G; MG/G
1 OINTMENT OPHTHALMIC EVERY 4 HOURS PRN
Qty: 28 G | Refills: 11 | Status: SHIPPED | OUTPATIENT
Start: 2022-04-08 | End: 2023-03-22

## 2022-04-08 NOTE — PROGRESS NOTES
"  Assessment & Plan   Problem List Items Addressed This Visit     None      Visit Diagnoses     Dry mouth    -  Primary    Relevant Medications    White Petrolatum-Mineral Oil (REFRESH LACRI-LUBE) OINT    Other Relevant Orders    Anti Nuclear Bree IgG by IFA with Reflex    SSA Ro JONNY Antibody IgG    SSB La JONNY Antibody IgG    Dry eyes        Relevant Medications    White Petrolatum-Mineral Oil (REFRESH LACRI-LUBE) OINT    Other Relevant Orders    Anti Nuclear Bree IgG by IFA with Reflex    SSA Ro JONNY Antibody IgG    SSB La JONNY Antibody IgG         Total time spent reviewing chart and preparing for appointment, with patient for appointment, and time spent charting and coordinating care on the day of the appointment in minutes was: 36             BMI:   Estimated body mass index is 30.2 kg/m  as calculated from the following:    Height as of 3/10/22: 1.664 m (5' 5.5\").    Weight as of 3/10/22: 83.6 kg (184 lb 4.8 oz).           No follow-ups on file.  We will follow up after results are back    Kristine Valadez MD  [facility]    Emani Muñoz is a 61 year old who presents for the following health issues     History of Present Illness       Reason for visit:  Dryness    She eats 2-3 servings of fruits and vegetables daily.She consumes 0 sweetened beverage(s) daily.She exercises with enough effort to increase her heart rate 30 to 60 minutes per day.  She exercises with enough effort to increase her heart rate 5 days per week.   She is taking medications regularly.     Previous ROBERTO in the past, has dry eyes and dry mouth, body aches, prevvious ROBERTO pattern was not c/w sliding scale          Review of Systems   As per HPI      Objective    /80 (BP Location: Right arm, Patient Position: Sitting)   Pulse 97   SpO2 99%   There is no height or weight on file to calculate BMI.  Physical Exam       Exam:  General: alert and oriented ×3 no acute distress.    HEENT: pupils are equal " round and reactive to light extraocular motion is intact. Normocephalic and atraumatic. Mmm and pink, she has salivary pooling on exam    Hearing is grossly normal and there is no otorrhea.     Chest: has bilateral rise with no increased work of breathing.    Cardiovascular: normal perfusion and brisk capillary refill.    Musculoskeletal: no gross focal abnormalities and normal gait.    Neuro: no gross focal abnormalities and memory seems intact.    Psychiatric: speech is clear and coherent and fluent. Patient dressed appropriately for the weather. Mood is appropriate and affect is full.        Discussed with patient to return to clinic if symptoms worsen or do not improve

## 2022-04-11 LAB
ENA SS-A AB SER IA-ACNC: 2.2 U/ML
ENA SS-A AB SER IA-ACNC: NEGATIVE
ENA SS-B IGG SER IA-ACNC: <0.6 U/ML
ENA SS-B IGG SER IA-ACNC: NEGATIVE

## 2022-04-11 NOTE — TELEPHONE ENCOUNTER
Please see patient's additional MyChart message dated 4/11/22.    Yocasta Dove RN  [facility]

## 2022-04-12 LAB
ANA PAT SER IF-IMP: ABNORMAL
ANA SER QL IF: ABNORMAL
ANA TITR SER IF: ABNORMAL {TITER}

## 2022-04-13 ENCOUNTER — MYC MEDICAL ADVICE (OUTPATIENT)
Dept: FAMILY MEDICINE | Facility: CLINIC | Age: 62
End: 2022-04-13
Payer: COMMERCIAL

## 2022-04-19 DIAGNOSIS — R68.2 DRY MOUTH: ICD-10-CM

## 2022-04-19 DIAGNOSIS — R76.8 POSITIVE ANA (ANTINUCLEAR ANTIBODY): Primary | ICD-10-CM

## 2022-04-19 NOTE — TELEPHONE ENCOUNTER
Patient would like to know if referral has been placed.    Please see and advise on patient MyChart message.    Yocasta Dove RN  [facility]

## 2022-04-21 ENCOUNTER — TELEPHONE (OUTPATIENT)
Dept: FAMILY MEDICINE | Facility: CLINIC | Age: 62
End: 2022-04-21
Payer: COMMERCIAL

## 2022-04-21 NOTE — TELEPHONE ENCOUNTER
Pt notified and states understanding. Question: She wonders about her cholesterol results being high. She states she has been working on her diet and exercising. She is hoping with the Ct score of 0 she still won't need a statin.

## 2022-04-21 NOTE — TELEPHONE ENCOUNTER
Please let patient know that her CT calcium score is 0 which puts her at a very low risk for cardiovascular disease events.  Maintaining a healthy lifestyle with exercise and a proper diet and balanced nutrition and weight control is encouraged for all adults tobacco should be avoided.  The radiology overview also showed no significant incidental extracardiac findings.

## 2022-07-19 ENCOUNTER — MYC MEDICAL ADVICE (OUTPATIENT)
Dept: FAMILY MEDICINE | Facility: CLINIC | Age: 62
End: 2022-07-19

## 2022-07-19 DIAGNOSIS — R11.0 NAUSEA: ICD-10-CM

## 2022-07-19 DIAGNOSIS — T75.3XXA MOTION SICKNESS: Primary | ICD-10-CM

## 2022-07-19 RX ORDER — SCOLOPAMINE TRANSDERMAL SYSTEM 1 MG/1
1 PATCH, EXTENDED RELEASE TRANSDERMAL
Qty: 4 PATCH | Refills: 0 | Status: SHIPPED | OUTPATIENT
Start: 2022-07-19 | End: 2023-03-22

## 2022-07-19 NOTE — TELEPHONE ENCOUNTER
Pt is requesting transderm patch for travel. Please advise if in agreement prescription.   Order attached.

## 2022-07-20 ENCOUNTER — VIRTUAL VISIT (OUTPATIENT)
Dept: ONCOLOGY | Facility: CLINIC | Age: 62
End: 2022-07-20
Attending: FAMILY MEDICINE
Payer: COMMERCIAL

## 2022-07-20 DIAGNOSIS — Z80.41 FAMILY HISTORY OF MALIGNANT NEOPLASM OF OVARY: Primary | ICD-10-CM

## 2022-07-20 DIAGNOSIS — Z80.3 FAMILY HISTORY OF MALIGNANT NEOPLASM OF BREAST: ICD-10-CM

## 2022-07-20 DIAGNOSIS — Z80.49 FAMILY HISTORY OF UTERINE CANCER: ICD-10-CM

## 2022-07-20 PROCEDURE — 96040 HC GENETIC COUNSELING, EACH 30 MINUTES: CPT | Mod: GT,95 | Performed by: GENETIC COUNSELOR, MS

## 2022-07-20 NOTE — LETTER
Cancer Risk Management  Program Locations    [facility]  [facility]  [facility]  [facility]  Mailing Address  Cancer Risk Management Program  [address]  [place] [address]    New patient appointments  [phone]  July 20, 2022    Wanda Alva  [id number] [facility] [id number]      Dear Wanda,    It was a pleasure speaking with you over video for genetic counseling on 7/20/2022. Here is a copy of the progress note from our discussion. If you have any additional questions, please feel free to call.    Referring Provider: Kristine Valadez MD    Presenting Information:   I spoke with Wanda Alva over video today for genetic counseling to discuss her family history of cancer. With her permission, this appointment was conducted virtually due to COVID-19 precautions. We talked today to review this history, cancer screening recommendations, and available genetic testing options.    Personal History:  Wanda is a 61 year old female. She does not have any personal history of cancer.     She had her first menstrual period at age 12, her first child at age 24, and is postmenopausal (age 50). Wanda has her ovaries, fallopian tubes and uterus in place. She reports that she has not used hormone replacement therapy. She has clinical breast exams and mammograms; her most recent mammogram on 12/9/21 was negative. Wanda reports that she has had one colonoscopy around 2005. She had the FIT colon screening test in March 2022 that was negative. She reports that she sees dermatology for skin exams and has had some biopsies that were negative. Wanda reported no history of tobacco use and no alcohol use.    Family History: (Please see scanned pedigree for detailed family history information)  Children:    She has two sons (ages 33  and 30), both with no known history of cancer.  Siblings:    She has three sisters and three brothers, all with no known history of cancer. Wanda is the oldest and the youngest is 51.  Maternal:    Her mother had a hysterectomy at age 44 and was found to have a cancer. Wanda is unsure whether it was uterine or ovarian cancer (she reports there is some discrepancy about this among her siblings). Her mother did not have any treatment after this surgery. She passed away at age 72.     Her half-aunt (through her grandmother) was diagnosed with breast cancer in her 70s and elected to pursue natural treatments. She passed away at age 72.    She has two full-uncles who are both in their 70s with no known history of cancer.    One of these uncles has a son (no cancer) who had a daughter who passed away at age 18 due to bone cancer.     She has 9 other half-aunts/uncles (all through her grandmother) and she is not aware of any history of cancer in them.    Her grandmother passed away in her 90s and her grandfather at age 40 due to a heart issue. Both had no known history of cancer.   Paternal:    Her father passed away at age 76 with no known history of cancer.     Her uncle was diagnosed with lung cancer and passed away at age 71. He had a history of smoking.     Her aunt had brain and lung cancer and passed away at age 50. Wanda is not sure if it was a brain cancer with metastases to her lung or vice versa. She had a history of smoking.    Another aunt is 75 years old with no known history of cancer.     Her son (Wanda's cousin) was diagnosed with leukemia and passed away at age 18.    She is not aware of any genetic testing in any of her relatives.    Her maternal ethnicity is unknown. Her paternal ethnicity is [place]. There is no known Ashkenazi Faith ancestry on either side of her family.     Discussion:    Wanda's family history of cancer is suggestive of a hereditary cancer syndrome.    We reviewed the  features of sporadic, familial, and hereditary cancers. In looking at Wanda's family history, it is possible that a cancer susceptibility gene is present due to her mother's history of either uterine or ovarian cancer at age 44, as well as her family history of breast and other cancers.    We discussed the natural history and genetics of hereditary cancer. Based on her family history, we discussed the BRCA1 and BRCA2 genes. Mutations in these genes cause a condition known as Hereditary Breast and Ovarian Cancer syndrome (HBOC). Women with a mutation in either of these genes are at increased risk for breast and ovarian cancer. There is also an increased risk for a second primary breast cancer. Men with a mutation in either of these genes are at increased risk for breast and prostate cancer. Both women and men may also be at increased risk for pancreatic cancer and melanoma. A detailed handout regarding these genes and other genes in which mutations are associated with an increased risk for gynecologic and breast cancer will be provided to Wanda via [employer] and can be found in the after visit summary. Topics included: inheritance pattern, cancer risks, cancer screening recommendations, and also risks, benefits and limitations of testing.    Based on her personal and family history, Wanda meets current National Comprehensive Cancer Network (NCCN) criteria for genetic testing of ovarian cancer susceptibility genes (if her mother's cancer was ovarian) or Valdes syndrome genes (if her mother's cancer was uterine).      We discussed that there are additional genes that could cause increased risk for ovarian, uterine, breast, and other cancers. As many of these genes present with overlapping features in a family and accurate cancer risk cannot always be established based upon the pedigree analysis alone, it would be reasonable for Wanda to consider panel genetic testing to analyze multiple genes at once.    We  reviewed genetic testing options for Wanda based on her personal and family history: a panel of genes associated with an increased risk for certain cancers, or larger panel options to include genes associated with increased risk for multiple different cancer types. Wanda expressed an interest in learning as much information as possible from the testing. We discussed expanded panel options including the CancerNext panel and the CustomNext-Cancer panel. She opted for an expanded CustomNext-Cancer panel (85 genes associated with an increased risk for multiple different types of cancer).  We discussed that many genes on this panel are associated with specific hereditary cancer syndromes and have published management guidelines. Other genes have medical management guidelines available to screen for certain cancers. The remaining genes are associated with increased cancer risk and may allow us to make medical recommendations when mutations are identified. Some of the genes on this expanded panel may have limited information available about specific cancer risks and therefore, there may be limited screening guidelines available. She stated that she understood potential limitations of a larger gene panel.     Due to COVID-19 precautions consent was obtained over the phone/video today. Genetic testing via the CustomNext-Cancer panel (85 genes associated with an increased risk for multiple different types of cancer) will be sent to [facility]. Wanda opted to have a saliva sample collection kit shipped directly to her home from [facility]. She will ship the kit back to [facility] for analysis. Turnaround time from date when sample is received at the lab: approximately 3-4 weeks.    Medical Management: For Wanda, we reviewed that the information from genetic testing may determine:    additional cancer screening for which Wanda may qualify (i.e. mammogram and breast MRI, more frequent colonoscopies, more  frequent dermatologic exams, etc.),    options for risk reducing surgeries Wanda could consider (i.e. bilateral mastectomy, surgery to remove her ovaries and/or uterus, etc.),      and targeted chemotherapies if she were to develop certain cancers in the future (i.e. immunotherapy for individuals with Valdes syndrome, PARP inhibitors, etc.).     These recommendations will be discussed in detail once genetic testing is completed.     Plan:  1) Today Wanda elected to proceed with genetic testing via the CustomNext-Cancer panel (85 genes associated with an increased risk for multiple different types of cancer) offered by [facility].  2) This information should be available in 4-5 weeks.  3) Wanda will be scheduled for a virtual visit (phone or video) to discuss the results.    Deyanira Nguyen MS, [facility]  Licensed, Certified Genetic Counselor  Office: [phone]  Email: [email or web address]

## 2022-07-20 NOTE — PROGRESS NOTES
Wanda is a 61 year old who is being evaluated via a billable video visit.      Patient confirms medications and allergies are accurate via patients echeck in completion, and or denies any changes since last reviewed/verified.     Lesly Cabral, Virtual Facilitator    How would you like to obtain your AVS? MyChart  If the video visit is dropped, the invitation should be resent by: Send to e-mail at: [email or web address]  Will anyone else be joining your video visit? No    Video-Visit Details    Video Start Time: 11:17 am    Type of service:  Video Visit    Video End Time:12:05 pm    Originating Location (pt. Location): Home    Distant Location (provider location):  [facility]     Platform used for Video Visit: [employer]     Video visit joined today by Meseret Banks GC Intern    7/20/2022    Referring Provider: Kristine Valadez MD    Presenting Information:   I spoke with Wanda Alva over video today for genetic counseling to discuss her family history of cancer. With her permission, this appointment was conducted virtually due to COVID-19 precautions. We talked today to review this history, cancer screening recommendations, and available genetic testing options.    Personal History:  Wanda is a 61 year old female. She does not have any personal history of cancer.     She had her first menstrual period at age 12, her first child at age 24, and is postmenopausal (age 50). Wanda has her ovaries, fallopian tubes and uterus in place. She reports that she has not used hormone replacement therapy. She has clinical breast exams and mammograms; her most recent mammogram on 12/9/21 was negative. Wanda reports that she has had one colonoscopy around 2005. She had the FIT colon screening test in March 2022 that was negative. She reports that she sees dermatology for skin exams and has had some biopsies that were negative. Wanda reported no history of tobacco use and no alcohol  use.    Family History: (Please see scanned pedigree for detailed family history information)  Children:    She has two sons (ages 33 and 30), both with no known history of cancer.  Siblings:    She has three sisters and three brothers, all with no known history of cancer. Wanda is the oldest and the youngest is 51.  Maternal:    Her mother had a hysterectomy at age 44 and was found to have a cancer. Wanda is unsure whether it was uterine or ovarian cancer (she reports there is some discrepancy about this among her siblings). Her mother did not have any treatment after this surgery. She passed away at age 72.     Her half-aunt (through her grandmother) was diagnosed with breast cancer in her 70s and elected to pursue natural treatments. She passed away at age 72.    She has two full-uncles who are both in their 70s with no known history of cancer.    One of these uncles has a son (no cancer) who had a daughter who passed away at age 18 due to bone cancer.     She has 9 other half-aunts/uncles (all through her grandmother) and she is not aware of any history of cancer in them.    Her grandmother passed away in her 90s and her grandfather at age 40 due to a heart issue. Both had no known history of cancer.   Paternal:    Her father passed away at age 76 with no known history of cancer.     Her uncle was diagnosed with lung cancer and passed away at age 71. He had a history of smoking.     Her aunt had brain and lung cancer and passed away at age 50. Wanda is not sure if it was a brain cancer with metastases to her lung or vice versa. She had a history of smoking.    Another aunt is 75 years old with no known history of cancer.     Her son (Wanda's cousin) was diagnosed with leukemia and passed away at age 18.    She is not aware of any genetic testing in any of her relatives.    Her maternal ethnicity is unknown. Her paternal ethnicity is [place]. There is no known Ashkenazi Cheondoism ancestry on either side of her  family.     Discussion:    Wanda's family history of cancer is suggestive of a hereditary cancer syndrome.    We reviewed the features of sporadic, familial, and hereditary cancers. In looking at Wanda's family history, it is possible that a cancer susceptibility gene is present due to her mother's history of either uterine or ovarian cancer at age 44, as well as her family history of breast and other cancers.    We discussed the natural history and genetics of hereditary cancer. Based on her family history, we discussed the BRCA1 and BRCA2 genes. Mutations in these genes cause a condition known as Hereditary Breast and Ovarian Cancer syndrome (HBOC). Women with a mutation in either of these genes are at increased risk for breast and ovarian cancer. There is also an increased risk for a second primary breast cancer. Men with a mutation in either of these genes are at increased risk for breast and prostate cancer. Both women and men may also be at increased risk for pancreatic cancer and melanoma. A detailed handout regarding these genes and other genes in which mutations are associated with an increased risk for gynecologic and breast cancer will be provided to Wanda via [employer] and can be found in the after visit summary. Topics included: inheritance pattern, cancer risks, cancer screening recommendations, and also risks, benefits and limitations of testing.    Based on her personal and family history, Wanda meets current National Comprehensive Cancer Network (NCCN) criteria for genetic testing of ovarian cancer susceptibility genes (if her mother's cancer was ovarian) or Valdes syndrome genes (if her mother's cancer was uterine).      We discussed that there are additional genes that could cause increased risk for ovarian, uterine, breast, and other cancers. As many of these genes present with overlapping features in a family and accurate cancer risk cannot always be established based upon the pedigree  analysis alone, it would be reasonable for Wanda to consider panel genetic testing to analyze multiple genes at once.    We reviewed genetic testing options for Wanda based on her personal and family history: a panel of genes associated with an increased risk for certain cancers, or larger panel options to include genes associated with increased risk for multiple different cancer types. Wanda expressed an interest in learning as much information as possible from the testing. We discussed expanded panel options including the CancerNext panel and the CustomNext-Cancer panel. She opted for an expanded CustomNext-Cancer panel (85 genes associated with an increased risk for multiple different types of cancer).  We discussed that many genes on this panel are associated with specific hereditary cancer syndromes and have published management guidelines. Other genes have medical management guidelines available to screen for certain cancers. The remaining genes are associated with increased cancer risk and may allow us to make medical recommendations when mutations are identified. Some of the genes on this expanded panel may have limited information available about specific cancer risks and therefore, there may be limited screening guidelines available. She stated that she understood potential limitations of a larger gene panel.     Due to COVID-19 precautions consent was obtained over the phone/video today. Genetic testing via the CustomNext-Cancer panel (85 genes associated with an increased risk for multiple different types of cancer) will be sent to [facility]. Wanda opted to have a saliva sample collection kit shipped directly to her home from [facility]. She will ship the kit back to [facility] for analysis. Turnaround time from date when sample is received at the lab: approximately 3-4 weeks.    Medical Management: For Wanda, we reviewed that the information from genetic testing may  determine:    additional cancer screening for which Wanda may qualify (i.e. mammogram and breast MRI, more frequent colonoscopies, more frequent dermatologic exams, etc.),    options for risk reducing surgeries Wanda could consider (i.e. bilateral mastectomy, surgery to remove her ovaries and/or uterus, etc.),      and targeted chemotherapies if she were to develop certain cancers in the future (i.e. immunotherapy for individuals with Valdes syndrome, PARP inhibitors, etc.).     These recommendations will be discussed in detail once genetic testing is completed.     Plan:  1) Today Wanda elected to proceed with genetic testing via the CustomNext-Cancer panel (85 genes associated with an increased risk for multiple different types of cancer) offered by [facility].  2) This information should be available in 4-5 weeks.  3) Wanda will be scheduled for a virtual visit (phone or video) to discuss the results.    Deyanira Nguyen MS, [facility]  Licensed, Certified Genetic Counselor  Office: [phone]  Email: [email or web address]

## 2022-07-20 NOTE — PATIENT INSTRUCTIONS
Assessing Cancer Risk  Only about 5-10% of cancers are thought to be due to an inherited cancer susceptibility gene.    These families often have:  Several people with the same or related types of cancer  Cancers diagnosed at a young age (before age 50)  Individuals with more than one primary cancer  Multiple generations of the family affected with cancer    Some people may be candidates for genetic testing of more than one gene.  For these families, genetic testing using a cancer panel may be offered.  These panels will test different genes known to increase the risk for breast, ovarian, uterine, and/or other cancers. All of the genes discussed below have published clinical management guidelines for individuals who are found to carry a mutation. The purpose of this handout is to serve as a brief summary of the genes analyzed by the panels used to inquire about hereditary breast and gynecologic cancer:  KADEN, BRCA1, BRCA2, BRIP1, CDH1, CHEK2, MLH1, MSH2, MSH6, PMS2, EPCAM, PTEN, PALB2, RAD51C, RAD51D, and TP53.  ______________________________________________________________________________  Hereditary Breast and Ovarian Cancer Syndrome   (BRCA1 and BRCA2)  A single mutation in one of the copies of BRCA1 or BRCA2 increases the risk for breast and ovarian cancer, among others.  The risk for pancreatic cancer and melanoma may also be slightly increased in some families.  The chart below shows the chance that someone with a BRCA mutation would develop cancer in his or her lifetime1,2,3,4.        A person s ethnic background is also important to consider, as individuals of Ashkenazi Mor[place] ancestry have a higher chance of having a BRCA gene mutation.  There are three BRCA mutations that occur more frequently in this population.    Valdes Syndrome   (MLH1, MSH2, MSH6, PMS2, and EPCAM)  Currently five genes are known to cause Valdes Syndrome: MLH1, MSH2, MSH6, PMS2, and EPCAM.  A single mutation in one of the Valdes  Syndrome genes increases the risk for colon, endometrial, ovarian, and stomach cancers.  Other cancers that occur less commonly in Valdes Syndrome include urinary tract, skin, and brain cancers.  The chart below shows the chance that a person with Valdes syndrome would develop cancer in his or her lifetime5.      *Cancer risk varies depending on Valdes syndrome gene found    Cowden Syndrome   (PTEN)  Cowden syndrome is a hereditary condition that increases the risk for breast, thyroid, endometrial, colon, and kidney cancer.  Cowden syndrome is caused by a mutation in the PTEN gene.  A single mutation in one of the copies of PTEN causes Cowden syndrome and increases cancer risk.  The chart below shows the chance that someone with a PTEN mutation would develop cancer in their lifetime6,7.  Other benign features seen in some individuals with Cowden syndrome include benign skin lesions (facial papules, keratoses, lipomas), learning disability, autism, thyroid nodules, colon polyps, and larger head size.      *One recent study found breast cancer risk to be increased to 85%    Li-Fraumeni Syndrome   (TP53)  Li-Fraumeni Syndrome (LFS) is a cancer predisposition syndrome caused by a mutation in the TP53 gene. A single mutation in one of the copies of TP53 increases the risk for multiple cancers. Individuals with LFS are at an increased risk for developing cancer at a young age. The lifetime risk for development of a LFS-associated cancer is 50% by age 30 and 90% by age 60.   Core Cancers: Sarcomas, Breast, Brain, Lung, Leukemias/Lymphomas, Adrenocortical carcinomas  Other Cancers: Gastrointestinal, Thyroid, Skin, Genitourinary    Hereditary Diffuse Gastric Cancer   (CDH1)  Currently, one gene is known to cause hereditary diffuse gastric cancer (HDGC): CDH1.  Individuals with HDGC are at increased risk for diffuse gastric cancer and lobular breast cancer. Of people diagnosed with HDGC, 30-50% have a mutation in the CDH1 gene.   This suggests there are likely other genes that may cause HDGC that have not been identified yet.      Lifetime Cancer Risks    General Population HDGC    Diffuse Gastric  <1% ~80%   Breast 12% 39-52%         Additional Genes  KADEN  KADEN is a moderate-risk breast cancer gene. Women who have a mutation in KADEN can have between a 2-4 fold increased risk for breast cancer compared to the general population8. KADEN mutations have also been associated with increased risk for pancreatic cancer, however an estimate of this cancer risk is not well understood9. Individuals who inherit two KADEN mutations have a condition called ataxia-telangiectasia (AT).  This rare autosomal recessive condition affects the nervous system and immune system, and is associated with progressive cerebellar ataxia beginning in childhood.  Individuals with ataxia-telangiectasia often have a weakened immune system and have an increased risk for childhood cancers.    PALB2  Mutations in PALB2 have been shown to increase the risk of breast cancer up to 33-58% in some families; where individuals fall within this risk range is dependent upon family [id number]. PALB2 mutations have also been associated with increased risk for pancreatic cancer, although this risk has not been quantified yet.  Individuals who inherit two PALB2 mutations--one from their mother and one from their father--have a condition called Fanconi Anemia.  This rare autosomal recessive condition is associated with short stature, developmental delay, bone marrow failure, and increased risk for childhood cancers.    CHEK2   CHEK2 is a moderate-risk breast cancer gene.  Women who have a mutation in CHEK2 have around a 2-fold increased risk for breast cancer compared to the general population, and this risk may be higher depending upon family history.11,12,13 Mutations in CHEK2 have also been shown to increase the risk of a number of other cancers, including colon and prostate, however these  cancer risks are currently not well understood.    BRIP1, RAD51C and RAD51D  Mutations in BRIP1, RAD51C, and RAD51D have been shown to increase the risk of ovarian cancer and possibly female breast cancer as well14,15 .       Lifetime Cancer Risk    General Population BRIP1 RAD51C RAD51D   Ovarian 1-2% ~5-8% ~5-9% ~7-15%           Inheritance  All of the cancer syndromes reviewed above are inherited in an autosomal dominant pattern.  This means that if a parent has a mutation, each of his or her children will have a 50% chance of inheriting that same mutation.  Therefore, each child--male or female--would have a 50% chance of being at increased risk for developing cancer.      Image obtained from Genetics Home Reference, 2013     Mutations in some genes can occur de roosevelt, which means that a person s mutation occurred for the first time in them and was not inherited from a parent.  Now that they have the mutation, however, it can be passed on to future generations.    Genetic Testing  Genetic testing involves a blood test and will look at the genetic information in the KADEN, BRCA1, BRCA2, BRIP1, CDH1, CHEK2, MLH1, MSH2, MSH6, PMS2, EPCAM, PTEN, PALB2, RAD51C, RAD51D, and TP53 genes for any harmful mutations that are associated with increased cancer risk.  If possible, it is recommended that the person(s) who has had cancer be tested before other family members.  That person will give us the most useful information about whether or not a specific gene is associated with the cancer in the family.    Results  There are three possible results of genetic testing:  Positive--a harmful mutation was identified in one or more of the genes  Negative--no mutation was identified in any of the genes on this panel  Variant of unknown significance--a variation in one of the genes was identified, but it is unclear how this impacts cancer risk in the family    Advantages and Disadvantages   There are advantages and disadvantages to  genetic testing.    Advantages  May clarify your cancer risk  Can help you make medical decisions  May explain the cancers in your family  May give useful information to your family members (if you share your results)    Disadvantages  Possible negative emotional impact of learning about inherited cancer risk  Uncertainty in interpreting a negative test result in some situations  Possible genetic discrimination concerns (see below)    Genetic Information Nondiscrimination Act (RAMIRO)  RAMIRO is a federal law that protects individuals from health insurance or employment discrimination based on a genetic test result alone.  Although rare, there are currently no legal discrimination protections in terms of life insurance, long term care, or disability insurances.  Visit the [employer] [address] [employer] [place] website to learn more.    Reducing Cancer Risk  All of the genes described above have nationally recognized cancer screening guidelines that would be recommended for individuals who test positive.  In addition to increased cancer screening, surgeries may be offered or recommended to reduce cancer risk.  Recommendations are based upon an individual s genetic test result as well as their personal and family history of cancer.    Questions to Think About Regarding Genetic Testing:  What effect will the test result have on me and my relationship with my family members if I have an inherited gene mutation?  If I don t have a gene mutation?  Should I share my test results, and how will my family react to this news, which may also affect them?  Are my children ready to learn new information that may one day affect their own health?    Hereditary Cancer Resources    FORCE: Facing Our Risk of Cancer Empowered facingourrisk.org   Bright Pink bebrightpink.org   Li-Fraumeni Syndrome Association lfsassociation.org   PTEN World PTENworld.ScratchJr   No stomach for cancer, Inc. nostomachforcancer.org   Stomach cancer relief network  Scrnet.org   Collaborative Group of the Americas on Inherited Colorectal Cancer (CGA) cgaicc.com    Cancer Care cancercare.org   American Cancer Society (ACS) cancer.org   National Cancer [place] (NCI) cancer.gov     Please call us if you have any questions or concerns.   Cancer Risk Management Program [phone]-CANCER ([phone])  Leobardo Azar, MS [facility] [phone]  Meredith Jhon, MS, [facility] [phone]  Ora Ratliff, MS, [facility]  [phone]  Abbey Pollack, MS, [facility]  [phone]  Deyanira Patrick, MS, [facility]  [phone]  Toyin Kohler, MS, [facility] [phone]  Carlene Thakur, MS, [facility] [phone]    References  Jhon Garcia PDP, Hoa S, Kraig PRICE, Katie JE, Deniz JL, Vern N, Gordo H, Brad O, Mindy A, Frank B, Kaitlin P, Mankevyn S, Bety DM, Abimael N, Denisha E, Lauri H, Romo E, Lubinski J, Gronwald J, Mary B, Alecia H, Thorlacius S, Eerola H, Theo H, Sully K, [place] OP. Average risks of breast and ovarian cancer associated with BRCA1 or BRCA2 mutations detected in case series unselected for family history: a combined analysis of 222 studies. Am J Hum Aviva. 2003;72:1117-30.  Star N, Tonja M, Shakira G.  BRCA1 and BRCA2 Hereditary Breast and Ovarian Cancer. Gene Reviews online. 2013.  Olvin YC, Param S, Anastasiya G, Santos S. Breast cancer risk among male BRCA1 and BRCA2 mutation carriers. J Natl Cancer Inst. 2007;99:1811-4.  Misha ADRIAN, Peyman I, Braydon J, Brisa E, Balwinder ER, Myron F. Risk of breast cancer in male BRCA2 carriers. J Med Aviva. 2010;47:710-1.  National Comprehensive Cancer Network. Clinical practice guidelines in oncology, colorectal cancer screening. Available online (registration required). 2015.  Javier HENDRIX, Josef J, Christie J, Ave LA, Rahul MS, Eng C. Lifetime cancer risks in individuals with germline PTEN mutations. Clin Cancer Res. 2012;18:400-7.  Raf ALVARADO. Cowden Syndrome: A Critical Review of the Clinical Literature. J Aviva .  2009:18:13-27.  Peter A, Rio D, Michael S, Abby P, Kaylan T, Adrian M, Armani B, Dinah H, Brady R, Abebe K, Aric L, Misha DG, Bety D, Ramin DF, Ehsan MR, The Breast Cancer Susceptibility Collaboration () & Flako DELANEY. KADEN mutations that cause ataxia-telangiectasia are breast cancer susceptibility alleles. Nature Genetics. 2006;38:873-875  Stuart N , Josemanuel Y, Monik J, Avi L, Abdiaziz GM , Christy ML, Gallinger S, Ahuja AG, Syngal S, Patrick ML, Neto J , Nicole R, Stephane SZ, Dinora JR, Chaparro VE, Erik M, Voguzman B, Christopher N, Katy RH, Blanquita KW, and Raquel AP. KADEN mutations in patients with hereditary pancreatic cancer. Cancer Discover. 2012;2:41-46  Eddie LAMBERT., et al. Breast-Cancer Risk in Families with Mutations in PALB2. NEJM. 2014; 371(6):497-506.  CHEK2 Breast Cancer Case-Control Consortium. CHEK2*1100delC and susceptibility to breast cancer: A collaborative analysis involving 10,860 breast cancer cases and 9,065 controls from 10 studies. Am J Hum Aviva, 74 (2004), pp. 4610-1965  Ariadna T, Marisol S, Uzair K, et al. Spectrum of Mutations in BRCA1, BRCA2, CHEK2, and TP53 in Families at High Risk of Breast Cancer. AMEENA. 2006;295(12):0545-3400.   Rita C, Belkys D, Min A, et al. Risk of breast cancer in women with a CHEK2 mutation with and without a family history of breast cancer. J Clin Oncol. 2011;29:2658-3108.  Asif H, Jenn E, Ethan SJ, et al. Contribution of germline mutations in the RAD51B, RAD51C, and RAD51D genes to ovarian cancer in the population. J Clin Oncol. 2015;33(26):0799-6657. Doi:10.1200/JCO.2015.61.2408.  Derrick Boatengon DF, Butch P, et al. Mutations in BRIP1 confer high risk of ovarian cancer. Lynda Aviva. 2011;43(11):1234-3437. doi:10.1038/ng.955.

## 2022-08-24 ENCOUNTER — VIRTUAL VISIT (OUTPATIENT)
Dept: ONCOLOGY | Facility: CLINIC | Age: 62
End: 2022-08-24
Attending: GENETIC COUNSELOR, MS
Payer: COMMERCIAL

## 2022-08-24 DIAGNOSIS — Z80.49 FAMILY HISTORY OF UTERINE CANCER: ICD-10-CM

## 2022-08-24 DIAGNOSIS — Z80.41 FAMILY HISTORY OF MALIGNANT NEOPLASM OF OVARY: Primary | ICD-10-CM

## 2022-08-24 DIAGNOSIS — Z80.3 FAMILY HISTORY OF MALIGNANT NEOPLASM OF BREAST: ICD-10-CM

## 2022-08-24 PROCEDURE — 999N000069 HC STATISTIC GENETIC COUNSELING, < 16 MIN: Mod: GT,95 | Performed by: GENETIC COUNSELOR, MS

## 2022-08-24 NOTE — PROGRESS NOTES
Wanda is a 61 year old who is being evaluated via a billable video visit.      How would you like to obtain your AVS? MyChart  If the video visit is dropped, the invitation should be resent by: Send to e-mail at: [email or web address]  Will anyone else be joining your video visit? Silvana Kunz VF- unable to edit existing note, new note created       Video-Visit Details    Video Start Time: 8:03 am    Type of service:  Video Visit    Video End Time: 8:14 am    Originating Location (pt. Location): Home    Distant Location (provider location):  [facility]ER [facility]     Platform used for Video Visit: StoryBlender

## 2022-08-24 NOTE — Clinical Note
Please send copy of letter to patient with test results. Please enclose test results:  Other Laboratory; [employer] [facility]; [facility]-Cancer Panel, genetic testing (Laboratory Miscellaneous Order) [[id number]] (Order [id number])

## 2022-08-24 NOTE — LETTER
Cancer Risk Management  Program Locations    [facility]  [facility]  [facility]  [facility]  Mailing Address  Cancer Risk Management Program  [address] [address]    New patient appointments  [phone]  August 26, 2022    Wanda Alva  [id number] [facility] [address]      Dear Wanda,  It was a pleasure speaking with you over video for genetic counseling on 8/24/2022. Here is a copy of the progress note from our discussion. If you have any additional questions, please feel free to call.    Referring Provider: Kristine Valadez MD  Presenting Information:  I spoke to Wanda over video today to discuss her genetic testing results. Testing was performed on a saliva sample collected by Wanda at her home. The [employer]-Cancer panel  (85 genes associated with an increased risk for multiple different types of cancer) was ordered from [employer]. This testing was done because of Wanda's family history of cancer.    Genetic Testing Result: NEGATIVE  Wanda is negative for mutations in the 85 genes analyzed: AIP, ALK, APC, KADEN, AXIN2, BAP1, BARD1, BLM, BMPR1A, BRCA1, BRCA2, BRIP1, CDC73, CDH1, CDK4, CDKN1B, CDKN2A, CHEK2, CTNNA1, DICER1, FANCC, FH, FLCN, GALNT12, KIF1B, LZTR1, MAX, MEN1, MET, MLH1, MRE11A, MSH2, MSH3, MSH6, MUTYH, NBN, NF1, NF2, NTHL1, PALB2, PHOX2B, PMS2, POT1, WUTPU6B, PTCH1, PTEN, RAD50, RAD51C, RAD51D, RB1, RECQL, RET, SDHA, SDHAF2, SDHB, SDHC, SDHD, SMAD4, SMARCA4, SMARCB1, SMARCE1, STK11, SUFU, NJFS192, TP53, TSC1, TSC2, VHL and XRCC2 (sequencing and deletion/duplication); EGFR, EGLN1, YGP303A, HOXB13, KIT, MITF, MLH3, PALLD, PDGFRA, POLD1, POLE, RINT1, RPS20 and TERT (sequencing only); EPCAM and GREM1 (deletion/duplication only).    Interpretation:  We discussed several different  interpretations of this negative test result.    1. One explanation may be that there is a different gene or combination of genes and environment that are associated with the cancers in this family.  2. It is possible that her relatives have a mutation in one of these genes and she did not inherit it.  3. There is also a small possibility that there is a mutation in one of these genes, and the testing laboratory could not find it with their current testing methods.     Screening:  Based on this negative test result, it is important for Wanda and her relatives to refer back to the family history for appropriate cancer screening.      Due to Wanda's family history of either ovarian or uterine cancer in her mother, Wanda and other close female relatives may remain at slightly increased risk for ovarian or uterine cancer. We discussed available ovarian/uterine cancer screening (pelvic exams, CA-125 blood tests, endometrial sampling, and transvaginal ultrasounds) as well as the significant limitations of this screening. As such, this screening is not typically recommended. That being said, women in this family should discuss this screening and the signs and symptoms of ovarian/uterine cancer with their primary OB/GYN provider, as they may have individualized recommendations.    Other population cancer screening options, such as those recommended by the American Cancer Society and the National Comprehensive Cancer Network (NCCN), are also appropriate for Wanda and her family. These screening recommendations may change if there are changes to Wanda's personal and/or family history of cancer. Final screening recommendations should be made by each individual's primary care provider.    Inheritance:  We reviewed the autosomal dominant inheritance of mutations in these genes. We discussed that Wanda cannot/did not pass on an identifiable mutation in these genes to her children based on this test result. Mutations  in these genes do not skip generations.    Additional Testing Considerations:  Although Wanda's genetic testing result was negative, other relatives may still carry a gene mutation associated with an increased risk for cancer. Genetic counseling is recommended for her siblings and maternal relatives to discuss genetic testing options. If any of these relatives do pursue genetic testing, Wanda is encouraged to contact me so that we may review the impact of their test results on her.  Summary:  We do not have an explanation for Wanda's family history of cancer. While no genetic changes were identified, Wanda may still be at risk for certain cancers due to family history, environmental factors, or other genetic causes not identified by this test. Because of that, it is important that she continue with cancer screening based on her personal and family history as discussed above.    Genetic testing is rapidly advancing, and new cancer susceptibility genes will most likely be identified in the future. Therefore, I encouraged Wanda to contact me annually or if there are changes in her personal or family history. This may change how we assess her cancer risk, screening, and the testing we would offer.  Plan:  1. A copy of the test results will be mailed to Wanda. A copy of her results was also released to her today via the online [employer] portal.  2. She plans to follow-up with her physicians.  3. She should contact me regularly, or sooner if her family history changes.    If Wanda has any further questions, I encouraged her to contact me at [phone].    Deyanira Nguyen MS, [facility]  Licensed, Certified Genetic Counselor  Office: [phone]  Email: [email or web address]

## 2022-08-24 NOTE — PROGRESS NOTES
"8/24/2022    Referring Provider: Kristine Valadez MD    Presenting Information:  I spoke to Wanda over video today to discuss her genetic testing results. Testing was performed on a saliva sample collected by Wanda at her home. The [employer]-Cancer panel  (85 genes associated with an increased risk for multiple different types of cancer) was ordered from [employer]. This testing was done because of Wanda's family history of cancer.    Genetic Testing Result: NEGATIVE  Wanda is negative for mutations in the 85 genes analyzed: AIP, ALK, APC, KADEN, AXIN2, BAP1, BARD1, BLM, BMPR1A, BRCA1, BRCA2, BRIP1, CDC73, CDH1, CDK4, CDKN1B, CDKN2A, CHEK2, CTNNA1, DICER1, FANCC, FH, FLCN, GALNT12, KIF1B, LZTR1, MAX, MEN1, MET, MLH1, MRE11A, MSH2, MSH3, MSH6, MUTYH, NBN, NF1, NF2, NTHL1, PALB2, PHOX2B, PMS2, POT1, AWCMO4P, PTCH1, PTEN, RAD50, RAD51C, RAD51D, RB1, RECQL, RET, SDHA, SDHAF2, SDHB, SDHC, SDHD, SMAD4, SMARCA4, SMARCB1, SMARCE1, STK11, SUFU, NQGI091, TP53, TSC1, TSC2, VHL and XRCC2 (sequencing and deletion/duplication); EGFR, EGLN1, CVN999S, HOXB13, KIT, MITF, MLH3, PALLD, PDGFRA, POLD1, POLE, RINT1, RPS20 and TERT (sequencing only); EPCAM and GREM1 (deletion/duplication only).      A copy of the test report can be found in the Laboratory tab, dated 8/5/22, and named \"LABORATORY MISCELLANEOUS ORDER\". The report is scanned in as a linked document.    Interpretation:  We discussed several different interpretations of this negative test result.    1. One explanation may be that there is a different gene or combination of genes and environment that are associated with the cancers in this family.  2. It is possible that her relatives have a mutation in one of these genes and she did not inherit it.  3. There is also a small possibility that there is a mutation in one of these genes, and the testing laboratory could not find it with their current testing methods.       Screening:  Based on this negative test " result, it is important for Wanda and her relatives to refer back to the family history for appropriate cancer screening.      Due to Wanda's family history of either ovarian or uterine cancer in her mother, Wanda and other close female relatives may remain at slightly increased risk for ovarian or uterine cancer. We discussed available ovarian/uterine cancer screening (pelvic exams, CA-125 blood tests, endometrial sampling, and transvaginal ultrasounds) as well as the significant limitations of this screening. As such, this screening is not typically recommended. That being said, women in this family should discuss this screening and the signs and symptoms of ovarian/uterine cancer with their primary OB/GYN provider, as they may have individualized recommendations.    Other population cancer screening options, such as those recommended by the American Cancer Society and the National Comprehensive Cancer Network (NCCN), are also appropriate for Wanda and her family. These screening recommendations may change if there are changes to Wanda's personal and/or family history of cancer. Final screening recommendations should be made by each individual's primary care provider.      Inheritance:  We reviewed the autosomal dominant inheritance of mutations in these genes. We discussed that Wanda cannot/did not pass on an identifiable mutation in these genes to her children based on this test result. Mutations in these genes do not skip generations.      Additional Testing Considerations:  Although Wanda's genetic testing result was negative, other relatives may still carry a gene mutation associated with an increased risk for cancer. Genetic counseling is recommended for her siblings and maternal relatives to discuss genetic testing options. If any of these relatives do pursue genetic testing, Wanda is encouraged to contact me so that we may review the impact of their test results on her.    Summary:  We do  not have an explanation for Wanda's family history of cancer. While no genetic changes were identified, Wanda may still be at risk for certain cancers due to family history, environmental factors, or other genetic causes not identified by this test. Because of that, it is important that she continue with cancer screening based on her personal and family history as discussed above.    Genetic testing is rapidly advancing, and new cancer susceptibility genes will most likely be identified in the future. Therefore, I encouraged Wanda to contact me annually or if there are changes in her personal or family history. This may change how we assess her cancer risk, screening, and the testing we would offer.    Plan:  1. A copy of the test results will be mailed to Wanda. A copy of her results was also released to her today via the online [employer] portal.  2. She plans to follow-up with her physicians.  3. She should contact me regularly, or sooner if her family history changes.    If Wanda has any further questions, I encouraged her to contact me at [phone].    Deyanira Nguyen MS, [facility]  Licensed, Certified Genetic Counselor  Office: [phone]  Email: [email or web address]

## 2022-10-02 ENCOUNTER — HEALTH MAINTENANCE LETTER (OUTPATIENT)
Age: 62
End: 2022-10-02

## 2022-10-13 ENCOUNTER — TRANSFERRED RECORDS (OUTPATIENT)
Dept: HEALTH INFORMATION MANAGEMENT | Facility: CLINIC | Age: 62
End: 2022-10-13

## 2022-10-13 NOTE — TELEPHONE ENCOUNTER
Lab results sent to outlook email and to be scanned into chart  
Response provided on separate similar message.  
13-Oct-2022 18:36

## 2022-11-17 ENCOUNTER — ALLIED HEALTH/NURSE VISIT (OUTPATIENT)
Dept: FAMILY MEDICINE | Facility: CLINIC | Age: 62
End: 2022-11-17
Payer: COMMERCIAL

## 2022-11-17 DIAGNOSIS — Z23 NEED FOR VACCINATION: Primary | ICD-10-CM

## 2022-11-17 PROCEDURE — 90471 IMMUNIZATION ADMIN: CPT

## 2022-11-17 PROCEDURE — 0134A COVID-19,PF,MODERNA BIVALENT: CPT

## 2022-11-17 PROCEDURE — 90682 RIV4 VACC RECOMBINANT DNA IM: CPT

## 2022-11-17 PROCEDURE — 91313 COVID-19,PF,MODERNA BIVALENT: CPT

## 2022-11-17 PROCEDURE — 99207 PR NO CHARGE NURSE ONLY: CPT

## 2023-03-03 ENCOUNTER — TELEPHONE (OUTPATIENT)
Dept: FAMILY MEDICINE | Facility: CLINIC | Age: 63
End: 2023-03-03
Payer: COMMERCIAL

## 2023-03-03 DIAGNOSIS — E78.2 MIXED HYPERLIPIDEMIA: Primary | ICD-10-CM

## 2023-03-03 DIAGNOSIS — R73.03 PREDIABETES: ICD-10-CM

## 2023-03-07 PROBLEM — R73.03 PREDIABETES: Status: ACTIVE | Noted: 2023-03-07

## 2023-03-07 PROBLEM — R76.8 POSITIVE ANTINUCLEAR ANTIBODY: Status: ACTIVE | Noted: 2023-03-07

## 2023-03-07 PROBLEM — E78.2 MIXED HYPERLIPIDEMIA: Status: ACTIVE | Noted: 2023-03-07

## 2023-03-07 PROBLEM — M17.12 PRIMARY OSTEOARTHRITIS OF LEFT KNEE: Status: ACTIVE | Noted: 2018-12-18

## 2023-03-07 PROBLEM — L92.0 GRANULOMA ANNULARE: Status: ACTIVE | Noted: 2023-03-07

## 2023-03-07 PROBLEM — D39.11 NEOPLASM OF UNCERTAIN BEHAVIOR OF RIGHT OVARY: Status: ACTIVE | Noted: 2023-03-07

## 2023-03-07 PROBLEM — Z86.19 HISTORY OF VIRAL INFECTION: Status: ACTIVE | Noted: 2023-03-07

## 2023-03-07 RX ORDER — OXYBUTYNIN CHLORIDE 5 MG/1
5 TABLET, EXTENDED RELEASE ORAL DAILY
COMMUNITY
Start: 2022-09-01 | End: 2023-03-22

## 2023-03-07 RX ORDER — ESTRADIOL 0.1 MG/G
CREAM VAGINAL SEE ADMIN INSTRUCTIONS
COMMUNITY

## 2023-03-07 NOTE — TELEPHONE ENCOUNTER
Pt informed of fasting labs that are due.  She would like to have these done prior to visit on 03/22/2023.  Orders placed/pended.

## 2023-03-21 ENCOUNTER — LAB (OUTPATIENT)
Dept: LAB | Facility: CLINIC | Age: 63
End: 2023-03-21
Payer: COMMERCIAL

## 2023-03-21 DIAGNOSIS — E78.2 MIXED HYPERLIPIDEMIA: ICD-10-CM

## 2023-03-21 DIAGNOSIS — R73.03 PREDIABETES: ICD-10-CM

## 2023-03-21 LAB
CHOLEST SERPL-MCNC: 308 MG/DL
FASTING STATUS PATIENT QL REPORTED: YES
GLUCOSE SERPL-MCNC: 102 MG/DL (ref 70–99)
HBA1C MFR BLD: 5.1 % (ref 0–5.6)
HDLC SERPL-MCNC: 59 MG/DL
LDLC SERPL CALC-MCNC: 225 MG/DL
NONHDLC SERPL-MCNC: 249 MG/DL
TRIGL SERPL-MCNC: 121 MG/DL

## 2023-03-21 PROCEDURE — 82947 ASSAY GLUCOSE BLOOD QUANT: CPT | Mod: QW

## 2023-03-21 PROCEDURE — 83036 HEMOGLOBIN GLYCOSYLATED A1C: CPT

## 2023-03-21 PROCEDURE — 36415 COLL VENOUS BLD VENIPUNCTURE: CPT

## 2023-03-21 PROCEDURE — 80061 LIPID PANEL: CPT

## 2023-03-22 ENCOUNTER — OFFICE VISIT (OUTPATIENT)
Dept: FAMILY MEDICINE | Facility: CLINIC | Age: 63
End: 2023-03-22
Payer: COMMERCIAL

## 2023-03-22 VITALS
TEMPERATURE: 98 F | OXYGEN SATURATION: 100 % | HEIGHT: 66 IN | SYSTOLIC BLOOD PRESSURE: 113 MMHG | RESPIRATION RATE: 16 BRPM | HEART RATE: 68 BPM | DIASTOLIC BLOOD PRESSURE: 76 MMHG | WEIGHT: 181 LBS | BODY MASS INDEX: 29.09 KG/M2

## 2023-03-22 DIAGNOSIS — N39.41 URGE INCONTINENCE OF URINE: ICD-10-CM

## 2023-03-22 DIAGNOSIS — R68.2 DRY MOUTH: ICD-10-CM

## 2023-03-22 DIAGNOSIS — Z11.4 SCREENING FOR HIV (HUMAN IMMUNODEFICIENCY VIRUS): ICD-10-CM

## 2023-03-22 DIAGNOSIS — R11.0 NAUSEA: ICD-10-CM

## 2023-03-22 DIAGNOSIS — H04.123 DRY EYES: ICD-10-CM

## 2023-03-22 DIAGNOSIS — Z12.11 SCREEN FOR COLON CANCER: ICD-10-CM

## 2023-03-22 DIAGNOSIS — Z00.00 ROUTINE GENERAL MEDICAL EXAMINATION AT A HEALTH CARE FACILITY: Primary | ICD-10-CM

## 2023-03-22 PROCEDURE — 99396 PREV VISIT EST AGE 40-64: CPT | Performed by: FAMILY MEDICINE

## 2023-03-22 PROCEDURE — 99213 OFFICE O/P EST LOW 20 MIN: CPT | Mod: 25 | Performed by: FAMILY MEDICINE

## 2023-03-22 RX ORDER — SCOLOPAMINE TRANSDERMAL SYSTEM 1 MG/1
1 PATCH, EXTENDED RELEASE TRANSDERMAL
Qty: 4 PATCH | Refills: 0 | Status: SHIPPED | OUTPATIENT
Start: 2023-03-22

## 2023-03-22 RX ORDER — ESTRADIOL 0.1 MG/G
CREAM VAGINAL SEE ADMIN INSTRUCTIONS
Status: CANCELLED | OUTPATIENT
Start: 2023-03-22

## 2023-03-22 RX ORDER — OXYBUTYNIN CHLORIDE 5 MG/1
5 TABLET, EXTENDED RELEASE ORAL DAILY
Qty: 90 TABLET | Refills: 3 | Status: SHIPPED | OUTPATIENT
Start: 2023-03-22 | End: 2024-04-22

## 2023-03-22 RX ORDER — MINERAL OIL, PETROLATUM 425; 568 MG/G; MG/G
1 OINTMENT OPHTHALMIC EVERY 4 HOURS PRN
Qty: 28 G | Refills: 11 | Status: SHIPPED | OUTPATIENT
Start: 2023-03-22

## 2023-03-22 ASSESSMENT — ENCOUNTER SYMPTOMS
BREAST MASS: 0
EYE PAIN: 1
JOINT SWELLING: 1
MYALGIAS: 1

## 2023-03-22 NOTE — PROGRESS NOTES
SUBJECTIVE:   CC: Wanda is an 62 year old who presents for preventive health visit.   Additional Questions 3/22/2023   Roomed by Odessa BENDER   Patient has been advised of split billing requirements and indicates understanding: Yes  Healthy Habits:     Getting at least 3 servings of Calcium per day:  Yes    Bi-annual eye exam:  Yes    Dental care twice a year:  Yes    Sleep apnea or symptoms of sleep apnea:  None    Diet:  Regular (no restrictions)    Frequency of exercise:  4-5 days/week    Duration of exercise:  45-60 minutes    Taking medications regularly:  Yes    Medication side effects:  None    PHQ-2 Total Score: 0    Additional concerns today:  Yes          Today's PHQ-2 Score:   PHQ-2 ( 1999 Pfizer) 3/22/2023   Q1: Little interest or pleasure in doing things 0   Q2: Feeling down, depressed or hopeless 0   PHQ-2 Score 0   Q1: Little interest or pleasure in doing things Not at all   Q2: Feeling down, depressed or hopeless Not at all   PHQ-2 Score 0           Social History     Tobacco Use     Smoking status: Never     Smokeless tobacco: Never   Substance Use Topics     Alcohol use: Not Currently         Alcohol Use 3/22/2023   Prescreen: >3 drinks/day or >7 drinks/week? No         Breast Cancer Screening:    FHS-7:   Breast CA Risk Assessment (FHS-7) 3/10/2022   Did any of your first-degree relatives have breast or ovarian cancer? Yes   Did any of your relatives have bilateral breast cancer? No   Did any man in your family have breast cancer? No   Did any woman in your family have breast and ovarian cancer? Yes   Did any woman in your family have breast cancer before age 50 y? No   Do you have 2 or more relatives with breast and/or ovarian cancer? No   Do you have 2 or more relatives with breast and/or bowel cancer? No     Mom had ovarian cancer, not breast cancer  Pertinent mammograms are reviewed under the imaging tab.    History of abnormal Pap smear: no     Reviewed and updated as needed this visit by  "clinical staff   Tobacco  Allergies  Meds     Guzman Escamilla          Reviewed and updated as needed this visit by Provider         Guzman Escamilla         Past Medical History:   Diagnosis Date     Arthritis      Carpal tunnel syndrome     right hand     Dry eye       Past Surgical History:   Procedure Laterality Date     ARTHROPLASTY REVISION KNEE Left 9/23/2020    Procedure: LEFT KNEE REVISION POLYETHYLENE EXCHANGE AND PATELLAR  DEBRIDEMENT;  Surgeon: Benjamín Ballard MD;  Location: [facility];  Service: Orthopedics     JOINT REPLACEMENT Left 2018    knee       Review of Systems   Eyes: Positive for pain.   Breasts:  Negative for tenderness, breast mass and discharge.   Genitourinary: Negative for pelvic pain, vaginal bleeding and vaginal discharge.   Musculoskeletal: Positive for joint swelling and myalgias.          OBJECTIVE:   /76 (BP Location: Right arm, Patient Position: Sitting, Cuff Size: Adult Large)   Pulse 68   Temp 98  F (36.7  C) (Oral)   Resp 16   Ht 1.664 m (5' 5.5\")   Wt 82.1 kg (181 lb)   SpO2 100%   BMI 29.66 kg/m    Physical Exam    General: alert and oriented ×3 no acute distress.    HEENT: pupils are equal round and reactive to light extraocular motion is intact. Normocephalic and atraumatic.     Hearing is grossly normal and there is no otorrhea.     Chest: has bilateral rise with no increased work of breathing.ctab    Cardiovascular: normal perfusion and brisk capillary refill.s1s2    Musculoskeletal: no gross focal abnormalities and normal gait.    Neuro: no gross focal abnormalities and memory seems intact.    Psychiatric: speech is clear and coherent and fluent. Patient dressed appropriately for the weather. Mood is appropriate and affect is full.        ASSESSMENT/PLAN:       ICD-10-CM    1. Routine general medical examination at a health care facility  Z00.00       2. Nausea  R11.0 scopolamine (TRANSDERM) 1 MG/3DAYS 72 hr patch      3. Dry mouth  R68.2 White " "Petrolatum-Mineral Oil (REFRESH LACRI-LUBE) OINT      4. Dry eyes  H04.123 White Petrolatum-Mineral Oil (REFRESH LACRI-LUBE) OINT      5. Screening for HIV (human immunodeficiency virus)  Z11.4       6. Screen for colon cancer  Z12.11 Fecal colorectal cancer screen FIT - Future (S+30)      7. Urge incontinence of urine  N39.41 oxybutynin ER (DITROPAN XL) 5 MG 24 hr tablet        Seasickness patient gets nausea with traveling and plans to go on another trip so renewed patient's locality patch which was effective.    Dry mouth and dry eyes are secondary to the oxybutynin however oxybutynin side effects are worth the benefit that she receives for treatment of the urge incontinence.  We will continue the oxybutynin.  She would also like a refill on her Lacri-Lube which was provided today.    Options for screening for colon cancer was discussed with patient she like to start with a fit test order provided today patient sent home with kit.    We discussed screening for HIV patient declines as she feels she is low risk.  She changes her mind in the future she will let me know.    Reviewed lipids, hemoglobin A1c and glucose          COUNSELING:  Reviewed preventive health counseling, as reflected in patient instructions      BMI:   Estimated body mass index is 29.66 kg/m  as calculated from the following:    Height as of this encounter: 1.664 m (5' 5.5\").    Weight as of this encounter: 82.1 kg (181 lb).   Weight management plan: Discussed healthy diet and exercise guidelines      She reports that she has never smoked. She has never used smokeless tobacco.          Kristine Valadez MD  [facility]  "

## 2023-03-27 PROCEDURE — 82274 ASSAY TEST FOR BLOOD FECAL: CPT | Performed by: FAMILY MEDICINE

## 2023-03-28 ENCOUNTER — MYC MEDICAL ADVICE (OUTPATIENT)
Dept: FAMILY MEDICINE | Facility: CLINIC | Age: 63
End: 2023-03-28
Payer: COMMERCIAL

## 2023-03-28 DIAGNOSIS — Z12.11 SCREEN FOR COLON CANCER: ICD-10-CM

## 2023-03-29 NOTE — TELEPHONE ENCOUNTER
See Hilariot from patient needing PCP review.    LOV 3/22/23  Would you like us to recommend a virtual visit to discuss her concerns?    Cony Snider RN  [facility]

## 2023-03-31 LAB — HEMOCCULT STL QL IA: NEGATIVE

## 2023-05-11 ENCOUNTER — MYC MEDICAL ADVICE (OUTPATIENT)
Dept: FAMILY MEDICINE | Facility: CLINIC | Age: 63
End: 2023-05-11
Payer: COMMERCIAL

## 2023-05-11 NOTE — TELEPHONE ENCOUNTER
Call with pt to discuss symptoms. Pt wears elisa hose for swelling in lower extremities. Pt stated the pain in her foot moves from the arch, the heel, side of the foot and the top of the foot. Pt agreed to schedule OV with PCP for evaluation and develop treatment plan.

## 2023-05-17 ENCOUNTER — OFFICE VISIT (OUTPATIENT)
Dept: FAMILY MEDICINE | Facility: CLINIC | Age: 63
End: 2023-05-17
Payer: COMMERCIAL

## 2023-05-17 VITALS
HEIGHT: 66 IN | HEART RATE: 71 BPM | DIASTOLIC BLOOD PRESSURE: 68 MMHG | SYSTOLIC BLOOD PRESSURE: 108 MMHG | TEMPERATURE: 98 F | OXYGEN SATURATION: 99 % | WEIGHT: 182.3 LBS | BODY MASS INDEX: 29.3 KG/M2

## 2023-05-17 DIAGNOSIS — Z96.659 HISTORY OF REVISION OF TOTAL KNEE ARTHROPLASTY: ICD-10-CM

## 2023-05-17 DIAGNOSIS — I89.0 LYMPHEDEMA OF LEFT LEG: ICD-10-CM

## 2023-05-17 DIAGNOSIS — M79.672 LEFT FOOT PAIN: Primary | ICD-10-CM

## 2023-05-17 PROCEDURE — 99214 OFFICE O/P EST MOD 30 MIN: CPT | Performed by: FAMILY MEDICINE

## 2023-05-17 RX ORDER — AMOXICILLIN 500 MG/1
4 CAPSULE ORAL
COMMUNITY

## 2023-05-17 NOTE — PATIENT INSTRUCTIONS
DR TALBOT COMPRESSION STOCKING    Call [employer] at [phone] to discuss getting custom orthotics, [facility]  Address: [address], [place] [address]

## 2023-05-17 NOTE — PROGRESS NOTES
"  Assessment & Plan   Problem List Items Addressed This Visit    None  Visit Diagnoses     Left foot pain    -  Primary    Relevant Orders    Orthopedic  Referral    History of revision of total knee arthroplasty        Lymphedema of left leg           COOPERFIT BRACE  Left foot pain, refer to podiatry, may cont  COOPERFIT BRACE if it is helpful    Left foot swelling suspect secondary to his of left knee arthroplasty, consider compression socks  Prn swelling                 Kristine Valadez MD  [facility]    Emani Muñoz is a 62 year old, presenting for the following health issues: left foot pain and swelling, 1 &1/2 years ago dx with plantar fasciitis, swelling sometimes goes up the lower left leg  Musculoskeletal Problem        5/17/2023     8:20 AM   Additional Questions   Roomed by mago casper   Accompanied by self     Musculoskeletal Problem    History of Present Illness       Reason for visit:  Pain and swelling in left foot.    She eats 2-3 servings of fruits and vegetables daily.She consumes 1 sweetened beverage(s) daily.She exercises with enough effort to increase her heart rate 30 to 60 minutes per day.  She exercises with enough effort to increase her heart rate 5 days per week.   She is taking medications regularly.             Review of Systems         Objective    /68 (BP Location: Right arm, Patient Position: Sitting)   Pulse 71   Temp 98  F (36.7  C)   Ht 1.664 m (5' 5.5\")   Wt 82.7 kg (182 lb 4.8 oz)   LMP  (LMP Unknown)   SpO2 99%   BMI 29.87 kg/m    Body mass index is 29.87 kg/m .  Physical Exam                       "

## 2023-08-24 ENCOUNTER — TRANSFERRED RECORDS (OUTPATIENT)
Dept: HEALTH INFORMATION MANAGEMENT | Facility: CLINIC | Age: 63
End: 2023-08-24
Payer: COMMERCIAL

## 2024-03-21 SDOH — HEALTH STABILITY: PHYSICAL HEALTH: ON AVERAGE, HOW MANY DAYS PER WEEK DO YOU ENGAGE IN MODERATE TO STRENUOUS EXERCISE (LIKE A BRISK WALK)?: 5 DAYS

## 2024-03-21 SDOH — HEALTH STABILITY: PHYSICAL HEALTH: ON AVERAGE, HOW MANY MINUTES DO YOU ENGAGE IN EXERCISE AT THIS LEVEL?: 60 MIN

## 2024-03-21 ASSESSMENT — SOCIAL DETERMINANTS OF HEALTH (SDOH): HOW OFTEN DO YOU GET TOGETHER WITH FRIENDS OR RELATIVES?: ONCE A WEEK

## 2024-03-28 ENCOUNTER — OFFICE VISIT (OUTPATIENT)
Dept: FAMILY MEDICINE | Facility: CLINIC | Age: 64
End: 2024-03-28
Payer: COMMERCIAL

## 2024-03-28 VITALS
TEMPERATURE: 98.1 F | BODY MASS INDEX: 28.61 KG/M2 | SYSTOLIC BLOOD PRESSURE: 100 MMHG | HEART RATE: 59 BPM | RESPIRATION RATE: 16 BRPM | DIASTOLIC BLOOD PRESSURE: 70 MMHG | WEIGHT: 178 LBS | HEIGHT: 66 IN | OXYGEN SATURATION: 99 %

## 2024-03-28 DIAGNOSIS — Z12.11 SCREEN FOR COLON CANCER: ICD-10-CM

## 2024-03-28 DIAGNOSIS — E78.2 MIXED HYPERLIPIDEMIA: ICD-10-CM

## 2024-03-28 DIAGNOSIS — Z13.1 SCREENING FOR DIABETES MELLITUS: ICD-10-CM

## 2024-03-28 DIAGNOSIS — R76.8 ELEVATED ANTINUCLEAR ANTIBODY (ANA) LEVEL: ICD-10-CM

## 2024-03-28 DIAGNOSIS — M25.50 MULTIPLE JOINT PAIN: ICD-10-CM

## 2024-03-28 DIAGNOSIS — Z00.00 ROUTINE GENERAL MEDICAL EXAMINATION AT A HEALTH CARE FACILITY: Primary | ICD-10-CM

## 2024-03-28 DIAGNOSIS — Z80.41 FAMILY HISTORY OF MALIGNANT NEOPLASM OF OVARY: ICD-10-CM

## 2024-03-28 DIAGNOSIS — R79.89 ELEVATED SERUM CREATININE: ICD-10-CM

## 2024-03-28 PROCEDURE — 86039 ANTINUCLEAR ANTIBODIES (ANA): CPT | Performed by: FAMILY MEDICINE

## 2024-03-28 PROCEDURE — 99396 PREV VISIT EST AGE 40-64: CPT | Performed by: FAMILY MEDICINE

## 2024-03-28 PROCEDURE — 80053 COMPREHEN METABOLIC PANEL: CPT | Performed by: FAMILY MEDICINE

## 2024-03-28 PROCEDURE — 86038 ANTINUCLEAR ANTIBODIES: CPT | Performed by: FAMILY MEDICINE

## 2024-03-28 PROCEDURE — 80061 LIPID PANEL: CPT | Performed by: FAMILY MEDICINE

## 2024-03-28 PROCEDURE — 36415 COLL VENOUS BLD VENIPUNCTURE: CPT | Performed by: FAMILY MEDICINE

## 2024-03-28 RX ORDER — LACTOBACILLUS RHAMNOSUS GG 10B CELL
1 CAPSULE ORAL 2 TIMES DAILY
COMMUNITY

## 2024-03-28 RX ORDER — GLUCOSAMINE SULFATE 500 MG
CAPSULE ORAL
COMMUNITY

## 2024-03-28 RX ORDER — PERPHENAZINE 4 MG
TABLET ORAL
COMMUNITY

## 2024-03-28 NOTE — PROGRESS NOTES
"Preventive Care Visit  [facility]  Kristine Valadez MD, Family Medicine  Mar 28, 2024      Assessment & Plan   Problem List Items Addressed This Visit       Mixed hyperlipidemia    Relevant Orders    Lipid panel reflex to direct LDL Fasting (Completed)     Other Visit Diagnoses       Routine general medical examination at a health care facility    -  Primary    Screen for colon cancer        Relevant Orders    Fecal colorectal cancer screen FIT - Future (S+30)    Screening for diabetes mellitus        Relevant Orders    Comprehensive metabolic panel (BMP + Alb, Alk Phos, ALT, AST, Total. Bili, TP) (Completed)    Elevated antinuclear antibody (ROBERTO) level        Relevant Orders    Anti Nuclear Bree IgG by IFA with Reflex    Multiple joint pain        Relevant Orders    Anti Nuclear Bree IgG by IFA with Reflex    Family history of malignant neoplasm of ovary        Relevant Orders    Adult Oncology/Hematology  Referral         Fasting labs    Patient has mixed hyperlipidemia we will get fasting lipid panel.    Screening for diabetes we will get a fasting blood sugar with her comprehensive metabolic panel.    Colon cancer screening discussed with patient.  She would prefer to do a fit test this year.  Order provided with Kit        Family history of ovarian cancer.  She did have some genetic testing done through [facility].  Unsure if she would benefit from the expanded panel.  New referral being placed to genetic counseling for clarification.               BMI  Estimated body mass index is 29.17 kg/m  as calculated from the following:    Height as of this encounter: 1.664 m (5' 5.5\").    Weight as of this encounter: 80.7 kg (178 lb).   Weight management plan: Discussed healthy diet and exercise guidelines    Counseling  Appropriate preventive services were discussed with this patient, including applicable screening as appropriate for fall prevention, nutrition, physical " activity, Tobacco-use cessation, weight loss and cognition.  Checklist reviewing preventive services available has been given to the patient.  Reviewed patient's diet, addressing concerns and/or questions.           Emani Muñoz is a 63 year old, presenting for the following:  Physical (She is fasting. She is taking 3 living well products, heal n soothe, Collagen, probiotic)        3/28/2024     1:09 PM   Additional Questions   Roomed by [facility] Care Directive  Patient does not have a Health Care Directive or Living Will: Discussed advance care planning with patient; information given to patient to review.    HPI  Here for annual exam            3/21/2024   General Health   How would you rate your overall physical health? Good   Feel stress (tense, anxious, or unable to sleep) Not at all         3/21/2024   Nutrition   Three or more servings of calcium each day? Yes   Diet: Regular (no restrictions)   How many servings of fruit and vegetables per day? (!) 2-3   How many sweetened beverages each day? 0-1         3/21/2024   Exercise   Days per week of moderate/strenous exercise 5 days   Average minutes spent exercising at this level 60 min         3/21/2024   Social Factors   Frequency of gathering with friends or relatives Once a week   Worry food won't last until get money to buy more No   Food not last or not have enough money for food? No   Do you have housing?  Yes   Are you worried about losing your housing? No   Lack of transportation? No   Unable to get utilities (heat,electricity)? No         3/21/2024   Fall Risk   Fallen 2 or more times in the past year? No   Trouble with walking or balance? No          3/21/2024   Dental   Dentist two times every year? Yes         3/21/2024   TB Screening   Were you born outside of the [place]? No         Today's PHQ-2 Score:       3/28/2024    12:43 PM   PHQ-2 ( 1999 Pfizer)   Q1: Little interest or pleasure in doing things 0   Q2: Feeling down, depressed  "or hopeless 0   PHQ-2 Score 0   Q1: Little interest or pleasure in doing things Not at all   Q2: Feeling down, depressed or hopeless Not at all   PHQ-2 Score 0           3/21/2024   Substance Use   Alcohol more than 3/day or more than 7/wk No   Do you use any other substances recreationally? No     Social History     Tobacco Use    Smoking status: Never    Smokeless tobacco: Never   Vaping Use    Vaping Use: Never used   Substance Use Topics    Alcohol use: Not Currently    Drug use: Never           3/10/2022   LAST FHS-7 RESULTS   1st degree relative breast or ovarian cancer Yes   Any relative bilateral breast cancer No   Any male have breast cancer No   Any ONE woman have BOTH breast AND ovarian cancer No, mom had ovarian cancer   Any woman with breast cancer before 50yrs No   2 or more relatives with breast AND/OR ovarian cancer No   2 or more relatives with breast AND/OR bowel cancer No     Patient has completed genetic counseling          3/21/2024   STI Screening   New sexual partner(s) since last STI/HIV test? No     History of abnormal Pap smear: NO - age 30-65 PAP every 5 years with negative HPV co-testing recommended       ASCVD Risk   The 10-year ASCVD risk score (Charmaine TEE, et al., 2019) is: 3.1%    Values used to calculate the score:      Age: 63 years      Sex: Female      Is Non- : No      Diabetic: No      Tobacco smoker: No      Systolic Blood Pressure: 100 mmHg      Is BP treated: No      HDL Cholesterol: 68 mg/dL      Total Cholesterol: 278 mg/dL           Reviewed and updated as needed this visit by Provider         Guzman Escamilla                     Objective    Exam  /70 (BP Location: Right arm, Patient Position: Sitting)   Pulse 59   Temp 98.1  F (36.7  C) (Tympanic)   Resp 16   Ht 1.664 m (5' 5.5\")   Wt 80.7 kg (178 lb)   LMP  (LMP Unknown)   SpO2 99%   BMI 29.17 kg/m     Estimated body mass index is 29.17 kg/m  as calculated from the following:    " "Height as of this encounter: 1.664 m (5' 5.5\").    Weight as of this encounter: 80.7 kg (178 lb).    Physical Exam        General: alert and oriented ×3 no acute distress.    HEENT: pupils are equal round and reactive to light extraocular motion is intact. Normocephalic and atraumatic.     Hearing is grossly normal and there is no otorrhea.     Chest: has bilateral rise with no increased work of breathing. ctab    Cardiovascular: normal perfusion and brisk capillary refill. s1s2    Musculoskeletal: no gross focal abnormalities and normal gait.    Neuro: no gross focal abnormalities and memory seems intact.    Psychiatric: speech is clear and coherent and fluent. Patient dressed appropriately for the weather. Mood is appropriate and affect is full.        Discussed with patient to return to clinic if symptoms worsen or do not improve          Signed Electronically by: Kristine Valadez MD    "

## 2024-03-28 NOTE — PATIENT INSTRUCTIONS
Preventive Care Advice   This is general advice given by our system to help you stay healthy. However, your care team may have specific advice just for you. Please talk to your care team about your preventive care needs.  Nutrition  Eat 5 or more servings of fruits and vegetables each day.  Try wheat bread, brown rice and whole grain pasta (instead of white bread, rice, and pasta).  Get enough calcium and vitamin D. Check the label on foods and aim for 100% of the RDA (recommended daily allowance).  Lifestyle  Exercise at least 150 minutes each week   (30 minutes a day, 5 days a week).  Do muscle strengthening activities 2 days a week. These help control your weight and prevent disease.  No smoking.  Wear sunscreen to prevent skin cancer.  Have a dental exam and cleaning every 6 months.  Yearly exams  See your health care team every year to talk about:  Any changes in your health.  Any medicines your care team has prescribed.  Preventive care, family planning, and ways to prevent chronic diseases.  Shots (vaccines)   HPV shots (up to age 26), if you've never had them before.  Hepatitis B shots (up to age 59), if you've never had them before.  COVID-19 shot: Get this shot when it's due.  Flu shot: Get a flu shot every year.  Tetanus shot: Get a tetanus shot every 10 years.  Pneumococcal, hepatitis A, and RSV shots: Ask your care team if you need these based on your risk.  Shingles shot (for age 50 and up).  General health tests  Diabetes screening:  Starting at age 35, Get screened for diabetes at least every 3 years.  If you are younger than age 35, ask your care team if you should be screened for diabetes.  Cholesterol test: At age 39, start having a cholesterol test every 5 years, or more often if advised.  Bone density scan (DEXA): At age 50, ask your care team if you should have this scan for osteoporosis (brittle bones).  Hepatitis C: Get tested at least once in your life.  STIs (sexually transmitted  infections)  Before age 24: Ask your care team if you should be screened for STIs.  After age 24: Get screened for STIs if you're at risk. You are at risk for STIs (including HIV) if:  You are sexually active with more than one person.  You don't use condoms every time.  You or a partner was diagnosed with a sexually transmitted infection.  If you are at risk for HIV, ask about PrEP medicine to prevent HIV.  Get tested for HIV at least once in your life, whether you are at risk for HIV or not.  Cancer screening tests  Cervical cancer screening: If you have a cervix, begin getting regular cervical cancer screening tests at age 21. Most people who have regular screenings with normal results can stop after age 65. Talk about this with your provider.  Breast cancer scan (mammogram): If you've ever had breasts, begin having regular mammograms starting at age 40. This is a scan to check for breast cancer.  Colon cancer screening: It is important to start screening for colon cancer at age 45.  Have a colonoscopy test every 10 years (or more often if you're at risk) Or, ask your provider about stool tests like a FIT test every year or Cologuard test every 3 years.  To learn more about your testing options, visit: https://www.mygola/958762.pdf.  For help making a decision, visit: https://bit.ly/ze38014.  Prostate cancer screening test: If you have a prostate and are age 55 to 69, ask your provider if you would benefit from a yearly prostate cancer screening test.  Lung cancer screening: If you are a current or former smoker age 50 to 80, ask your care team if ongoing lung cancer screenings are right for you.  For informational purposes only. Not to replace the advice of your health care provider. Copyright   2023 [employer]. All rights reserved. Clinically reviewed by the [facility] Transitions Program. LK FREEMAN 977638 - REV 01/24.

## 2024-03-29 LAB
ALBUMIN SERPL BCG-MCNC: 5.3 G/DL (ref 3.5–5.2)
ALP SERPL-CCNC: 84 U/L (ref 40–150)
ALT SERPL W P-5'-P-CCNC: 22 U/L (ref 0–50)
ANION GAP SERPL CALCULATED.3IONS-SCNC: 13 MMOL/L (ref 7–15)
AST SERPL W P-5'-P-CCNC: 20 U/L (ref 0–45)
BILIRUB SERPL-MCNC: 1.1 MG/DL
BUN SERPL-MCNC: 21.3 MG/DL (ref 8–23)
CALCIUM SERPL-MCNC: 10.2 MG/DL (ref 8.8–10.2)
CHLORIDE SERPL-SCNC: 100 MMOL/L (ref 98–107)
CHOLEST SERPL-MCNC: 278 MG/DL
CREAT SERPL-MCNC: 1.03 MG/DL (ref 0.51–0.95)
DEPRECATED HCO3 PLAS-SCNC: 26 MMOL/L (ref 22–29)
EGFRCR SERPLBLD CKD-EPI 2021: 61 ML/MIN/1.73M2
FASTING STATUS PATIENT QL REPORTED: ABNORMAL
GLUCOSE SERPL-MCNC: 93 MG/DL (ref 70–99)
HDLC SERPL-MCNC: 68 MG/DL
LDLC SERPL CALC-MCNC: 189 MG/DL
NONHDLC SERPL-MCNC: 210 MG/DL
POTASSIUM SERPL-SCNC: 3.7 MMOL/L (ref 3.4–5.3)
PROT SERPL-MCNC: 8.5 G/DL (ref 6.4–8.3)
SODIUM SERPL-SCNC: 139 MMOL/L (ref 135–145)
TRIGL SERPL-MCNC: 103 MG/DL

## 2024-04-02 LAB
ANA PAT SER IF-IMP: ABNORMAL
ANA SER QL IF: POSITIVE
ANA TITR SER IF: ABNORMAL {TITER}

## 2024-04-02 PROCEDURE — 82274 ASSAY TEST FOR BLOOD FECAL: CPT

## 2024-04-03 ENCOUNTER — APPOINTMENT (OUTPATIENT)
Dept: LAB | Facility: CLINIC | Age: 64
End: 2024-04-03
Payer: COMMERCIAL

## 2024-04-05 DIAGNOSIS — Z12.11 SCREEN FOR COLON CANCER: ICD-10-CM

## 2024-04-08 LAB — HEMOCCULT STL QL IA: NEGATIVE

## 2024-04-13 ENCOUNTER — WALK IN (OUTPATIENT)
Dept: URGENT CARE | Age: 64
End: 2024-04-13

## 2024-04-13 VITALS
OXYGEN SATURATION: 99 % | BODY MASS INDEX: 29.66 KG/M2 | HEIGHT: 65 IN | RESPIRATION RATE: 16 BRPM | WEIGHT: 178 LBS | DIASTOLIC BLOOD PRESSURE: 76 MMHG | HEART RATE: 71 BPM | TEMPERATURE: 97.6 F | SYSTOLIC BLOOD PRESSURE: 114 MMHG

## 2024-04-13 DIAGNOSIS — J30.2 SEASONAL ALLERGIES: Primary | ICD-10-CM

## 2024-04-13 ASSESSMENT — VISUAL ACUITY: OU: 1

## 2024-04-13 ASSESSMENT — ENCOUNTER SYMPTOMS
EYE DISCHARGE: 1
RHINORRHEA: 0
EYE ITCHING: 1
EYE REDNESS: 0

## 2024-04-21 DIAGNOSIS — N39.41 URGE INCONTINENCE OF URINE: ICD-10-CM

## 2024-04-22 RX ORDER — OXYBUTYNIN CHLORIDE 5 MG/1
5 TABLET, EXTENDED RELEASE ORAL DAILY
Qty: 90 TABLET | Refills: 2 | Status: SHIPPED | OUTPATIENT
Start: 2024-04-22

## 2024-05-07 ENCOUNTER — LAB (OUTPATIENT)
Dept: LAB | Facility: CLINIC | Age: 64
End: 2024-05-07
Payer: COMMERCIAL

## 2024-05-07 DIAGNOSIS — R79.89 ELEVATED SERUM CREATININE: ICD-10-CM

## 2024-05-07 LAB
ALBUMIN SERPL BCG-MCNC: 4.6 G/DL (ref 3.5–5.2)
ALP SERPL-CCNC: 85 U/L (ref 40–150)
ALT SERPL W P-5'-P-CCNC: 26 U/L (ref 0–50)
ANION GAP SERPL CALCULATED.3IONS-SCNC: 10 MMOL/L (ref 7–15)
AST SERPL W P-5'-P-CCNC: 22 U/L (ref 0–45)
BILIRUB SERPL-MCNC: 0.9 MG/DL
BUN SERPL-MCNC: 15.1 MG/DL (ref 8–23)
CALCIUM SERPL-MCNC: 10.1 MG/DL (ref 8.8–10.2)
CHLORIDE SERPL-SCNC: 102 MMOL/L (ref 98–107)
CREAT SERPL-MCNC: 1 MG/DL (ref 0.51–0.95)
DEPRECATED HCO3 PLAS-SCNC: 28 MMOL/L (ref 22–29)
EGFRCR SERPLBLD CKD-EPI 2021: 63 ML/MIN/1.73M2
GLUCOSE SERPL-MCNC: 97 MG/DL (ref 70–99)
POTASSIUM SERPL-SCNC: 4.8 MMOL/L (ref 3.4–5.3)
PROT SERPL-MCNC: 7.6 G/DL (ref 6.4–8.3)
SODIUM SERPL-SCNC: 140 MMOL/L (ref 135–145)

## 2024-05-07 PROCEDURE — 80053 COMPREHEN METABOLIC PANEL: CPT

## 2024-05-07 PROCEDURE — 36415 COLL VENOUS BLD VENIPUNCTURE: CPT

## 2024-06-27 ENCOUNTER — DOCUMENTATION ONLY (OUTPATIENT)
Dept: OTHER | Facility: CLINIC | Age: 64
End: 2024-06-27
Payer: COMMERCIAL

## 2024-09-18 ENCOUNTER — HOSPITAL ENCOUNTER (OUTPATIENT)
Dept: GENERAL RADIOLOGY | Facility: HOSPITAL | Age: 64
Discharge: HOME | End: 2024-09-18
Attending: INTERNAL MEDICINE | Admitting: INTERNAL MEDICINE
Payer: COMMERCIAL

## 2024-09-18 ENCOUNTER — LAB (OUTPATIENT)
Dept: LAB | Facility: CLINIC | Age: 64
End: 2024-09-18
Payer: COMMERCIAL

## 2024-09-18 ENCOUNTER — OFFICE VISIT (OUTPATIENT)
Dept: RHEUMATOLOGY | Facility: CLINIC | Age: 64
End: 2024-09-18
Attending: FAMILY MEDICINE
Payer: COMMERCIAL

## 2024-09-18 VITALS
HEART RATE: 80 BPM | HEIGHT: 66 IN | WEIGHT: 178 LBS | SYSTOLIC BLOOD PRESSURE: 138 MMHG | DIASTOLIC BLOOD PRESSURE: 84 MMHG | BODY MASS INDEX: 28.61 KG/M2

## 2024-09-18 DIAGNOSIS — R76.8 ELEVATED ANTINUCLEAR ANTIBODY (ANA) LEVEL: ICD-10-CM

## 2024-09-18 DIAGNOSIS — M25.50 MULTIPLE JOINT PAIN: ICD-10-CM

## 2024-09-18 DIAGNOSIS — R76.8 ELEVATED ANTINUCLEAR ANTIBODY (ANA) LEVEL: Primary | ICD-10-CM

## 2024-09-18 LAB
BASOPHILS # BLD AUTO: 0 10E3/UL (ref 0–0.2)
BASOPHILS NFR BLD AUTO: 0 %
EOSINOPHIL # BLD AUTO: 0 10E3/UL (ref 0–0.7)
EOSINOPHIL NFR BLD AUTO: 1 %
ERYTHROCYTE [DISTWIDTH] IN BLOOD BY AUTOMATED COUNT: 12.1 % (ref 10–15)
ERYTHROCYTE [SEDIMENTATION RATE] IN BLOOD BY WESTERGREN METHOD: 18 MM/HR (ref 0–30)
HCT VFR BLD AUTO: 39 % (ref 35–47)
HGB BLD-MCNC: 13.5 G/DL (ref 11.7–15.7)
IMM GRANULOCYTES # BLD: 0 10E3/UL
IMM GRANULOCYTES NFR BLD: 0 %
LYMPHOCYTES # BLD AUTO: 2 10E3/UL (ref 0.8–5.3)
LYMPHOCYTES NFR BLD AUTO: 33 %
MCH RBC QN AUTO: 29.4 PG (ref 26.5–33)
MCHC RBC AUTO-ENTMCNC: 34.6 G/DL (ref 31.5–36.5)
MCV RBC AUTO: 85 FL (ref 78–100)
MONOCYTES # BLD AUTO: 0.6 10E3/UL (ref 0–1.3)
MONOCYTES NFR BLD AUTO: 10 %
NEUTROPHILS # BLD AUTO: 3.3 10E3/UL (ref 1.6–8.3)
NEUTROPHILS NFR BLD AUTO: 56 %
PLATELET # BLD AUTO: 233 10E3/UL (ref 150–450)
RBC # BLD AUTO: 4.59 10E6/UL (ref 3.8–5.2)
WBC # BLD AUTO: 6 10E3/UL (ref 4–11)

## 2024-09-18 PROCEDURE — 86431 RHEUMATOID FACTOR QUANT: CPT

## 2024-09-18 PROCEDURE — 86225 DNA ANTIBODY NATIVE: CPT

## 2024-09-18 PROCEDURE — 82040 ASSAY OF SERUM ALBUMIN: CPT

## 2024-09-18 PROCEDURE — 99204 OFFICE O/P NEW MOD 45 MIN: CPT | Performed by: INTERNAL MEDICINE

## 2024-09-18 PROCEDURE — 73630 X-RAY EXAM OF FOOT: CPT | Mod: 50

## 2024-09-18 PROCEDURE — 36415 COLL VENOUS BLD VENIPUNCTURE: CPT

## 2024-09-18 PROCEDURE — 86140 C-REACTIVE PROTEIN: CPT

## 2024-09-18 PROCEDURE — 85025 COMPLETE CBC W/AUTO DIFF WBC: CPT

## 2024-09-18 PROCEDURE — 86803 HEPATITIS C AB TEST: CPT

## 2024-09-18 PROCEDURE — 82728 ASSAY OF FERRITIN: CPT

## 2024-09-18 PROCEDURE — 85652 RBC SED RATE AUTOMATED: CPT

## 2024-09-18 PROCEDURE — 86200 CCP ANTIBODY: CPT

## 2024-09-18 PROCEDURE — 86235 NUCLEAR ANTIGEN ANTIBODY: CPT

## 2024-09-18 PROCEDURE — 86160 COMPLEMENT ANTIGEN: CPT

## 2024-09-18 PROCEDURE — 84460 ALANINE AMINO (ALT) (SGPT): CPT

## 2024-09-18 PROCEDURE — G2211 COMPLEX E/M VISIT ADD ON: HCPCS | Performed by: INTERNAL MEDICINE

## 2024-09-18 PROCEDURE — 82565 ASSAY OF CREATININE: CPT

## 2024-09-18 NOTE — PROGRESS NOTES
This document was created using a software with less than 100% fidelity, at times resulting in unintended, even erroneous syntax and grammar.  The reader is advised to keep this under consideration while reviewing, interpreting this note.      Rheumatology Consult Note      Wanda Alva     YOB: 1960 Age: 63 year old    Date of visit: 9/18/24    PCP: Kristine Valadez    Chief Complaint   Patient presents with:  Consult: Multiple joint pain, +ce       Assessment and Plan     Wanda was seen today for consult.    Diagnoses and all orders for this visit:    Elevated antinuclear antibody (CE) level  -     Adult Rheumatology  Referral  -     JONNY antibody panel; Future  -     Erythrocyte sedimentation rate auto; Future  -     Hepatitis C antibody; Future  -     Ferritin; Future  -     Rheumatoid factor; Future  -     Albumin level; Future  -     ALT; Future  -     CBC with Platelets & Differential; Future  -     Complement C3; Future  -     Complement C4; Future  -     Creatinine; Future  -     CRP inflammation; Future  -     Cyclic Citrullinated Peptide Antibody IgG; Future  -     DNA double stranded antibodies; Future  -     XR Foot Bilateral G/E 3 Views; Future    Multiple joint pain  -     Adult Rheumatology  Referral  -     JONNY antibody panel; Future  -     Erythrocyte sedimentation rate auto; Future  -     Hepatitis C antibody; Future  -     Ferritin; Future  -     Rheumatoid factor; Future  -     Albumin level; Future  -     ALT; Future  -     CBC with Platelets & Differential; Future  -     Complement C3; Future  -     Complement C4; Future  -     Creatinine; Future  -     CRP inflammation; Future  -     Cyclic Citrullinated Peptide Antibody IgG; Future  -     DNA double stranded antibodies; Future  -     XR Foot Bilateral G/E 3 Views; Future           This patient presents with positive CE, 1: 1280.  Previously she has had intermittent positive serology for this.  She  "describes 18 months of mouth ulcers.  12 months of nocturnal symptoms which sound inflammatory such as \"flulike feeling\".  She is going to check her temperature before she goes to bed and during the night when she experiences those symptoms again and after she wakes up, this will be for the next 2 to 3 weeks.  Today I have outlined to her what ROBERTO stands for, how to think through this.  She is going to undergo further workup as noted.  X-rays of the feet will be taken today.  Once these data are available we will meet here again further course to be charted accordingly.    With regards to osteoarthritis she has had TKA on the left side has symptoms on the right knee management principles of osteoarthritis were reviewed.  The longitudinal plan of care for the diagnosis(es)/condition(s) as documented were addressed during this visit. Due to the added complexity in care, I will continue to support Wanda in the subsequent management and with ongoing continuity of care.  '      HPI   Wanda Alva is a 63 year old female  is seen today for evaluation of 12 to 18-month long history of symptoms, positive ROBERTO that has been intermittently positive for the past several years.  She noted that during the past year or so quite frequently she is woken up from sleep because of \"flulike symptoms \".  It feels as if she is certainly achy all over.  She is not sure if she feels warmer she is never checked her temperature as she does not have diaphoresis.  She can avoid having these nights time symptoms if she were to take Tylenol.  Prior to that she used to take ibuprofen but then she had renal impairment and was asked not to continue that.  She reports history of dryness of mouth dryness of eyes vaginal dryness skin dryness.  She has not had swelling of the mandibular or parotid regions.  She has not had photosensitivity.  She had developed mouth ulcers talk to her dentist and none were found but she is sure that over the past " "18 months she has 3 or 4 such episodes which are like canker sores.  She has not had iritis like symptoms she has a rash on her lower abdomen, proximal lower extremities was seen in dermatology 3 years ago and was told that this will resolve in about 12 months it has not.  There is no sun sensitivity however.  There is no pleuritic symptoms.  She has not had PE DVT she has had 2 pregnancies no miscarriages.  She has no history of seizure disorder.  No kidney issues.  She has not had Raynaud's.  She is familiar with it as her sister, younger has lupus and she has Raynaud's.  In 2017 she had left TKA that needed to be revised in 2020 as it had been \"bad feet\".  She would occasionally get symptoms there.  There are no other significant joint symptoms in the upper extremities, shoulders neck or back hips and knees, her feet often are painful first thing in the morning her stiffness ranges between 5 to 10 minutes.  She has not observed swelling in any of her joint areas.  She describes no psoriasis ulcerative colitis Crohn's disease herself or the family.  She is not a smoker.  She used to work as a nursing assistant..           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     Past Medical History     Past Medical History:   Diagnosis Date    Arthritis     Carpal tunnel syndrome     right hand    Dry eye      Past Surgical History     Past Surgical History:   Procedure Laterality Date    ARTHROPLASTY REVISION KNEE Left 9/23/2020    Procedure: LEFT KNEE REVISION POLYETHYLENE EXCHANGE AND PATELLAR  DEBRIDEMENT;  Surgeon: Benjamín Ballard MD;  Location: [facility] OR;  Service: Orthopedics    JOINT REPLACEMENT Left 2018    knee     Allergy     Allergies   Allergen Reactions    Blood-Group " Specific Substance Other (See Comments)     Patient has Nonspecific antibody. Blood products may be delayed. Draw patient 24 hours prior to transfusion. Draw one red top and two purple top tubes for all type and screen orders.    Nickel Rash     Current Medication List     Current Outpatient Medications   Medication Sig Dispense Refill    acetaminophen (TYLENOL) 500 MG tablet [ACETAMINOPHEN (TYLENOL) 500 MG TABLET] Take 2 tablets (1,000 mg total) by mouth 3 (three) times a day.  0    amoxicillin (AMOXIL) 500 MG capsule Take 4 capsules by mouth As directed prior to dental procedures      calcium citrate-vitamin D3 (CITRACAL + D) 315 mg- 250 unit per tablet Take 1 tablet by mouth daily       Collagen-Vitamin C-Biotin (COLLAGEN 1500/C) 500-50-0.8 MG CAPS       estradiol (ESTRACE) 0.1 MG/GM vaginal cream See Admin Instructions      lactobacillus rhamnosus, GG, (CULTURELL) capsule Take 1 capsule by mouth 2 times daily      scopolamine (TRANSDERM) 1 MG/3DAYS 72 hr patch Place 1 patch onto the skin every 72 hours 4 patch 0    White Petrolatum-Mineral Oil (REFRESH LACRI-LUBE) OINT Apply 1 Application. to eye every 4 hours as needed (dry eyes) 28 g 11    glucosamine 500 MG CAPS Super Joint Support, by [facility] (Patient not taking: Reported on 9/18/2024)      MAGNESIUM BISGLYCINATE PO  (Patient not taking: Reported on 9/18/2024)      oxyBUTYnin ER (DITROPAN XL) 5 MG 24 hr tablet TAKE 1 TABLET(5 MG) BY MOUTH DAILY (Patient not taking: Reported on 9/18/2024) 90 tablet 2     No current facility-administered medications for this visit.       No current facility-administered medications for this visit.        Family History     Family History   Problem Relation Age of Onset    Diabetes Type 2  Mother     Pulmonary Embolism Mother     Obesity Mother     Ovarian Cancer Mother     Heart Disease Father     Diabetes Type 2  Father     Cerebrovascular Disease Father     Lupus Sister     Hyperlipidemia Brother     No Known Problems  "Maternal Grandmother     Coronary Artery Disease Early Onset Maternal Grandfather     Lupus Paternal Grandmother     Heart Disease Paternal Grandfather     Cerebrovascular Disease Paternal Grandfather     Diabetes Type 2  Paternal Grandfather          Physical Exam     COMPREHENSIVE EXAMINATION:  Vitals:    09/18/24 1417   BP: 138/84   BP Location: Right arm   Patient Position: Sitting   Cuff Size: Adult Regular   Pulse: 80   Weight: 80.7 kg (178 lb)   Height: 1.664 m (5' 5.5\")     A well appearing alert oriented female. Vital data as noted above. Her eyes examined for inflammation/scleromalacia. ENT examined for oral mucositis, moisture, thrush, nasal deformity, external ear redness, deformity. Her neck is examined for suppleness and lymphadenopathy.  Cardiopulmonary examination without dyspnea at rest, use of accessory muscles of breathing, wheezing, edema, peripheral or central cyanosis.  Abdomen examined for softness, tenderness, obvious organomegaly.  Skin examined for heliotrope, malar area eruption, lupus pernio, periungual erythema, sclerodactyly, papules, erythema nodosum, purpura, nail pitting, onycholysis, and obvious psoriasis lesion. Neurological examination done for alertness, speech, facial symmetry,  tone and power in upper and lower extremities, and gait. The joint examination is performed for swelling, tenderness, warmth, erythema, and range of motion in the following joints: DIPs, PIPs, MCPs, wrists, first CMC's, elbows, shoulders, hips, knees, ankles, feet; spine for range of motion and paraspinal muscles for tenderness. The salient normal / abnormal findings are appended.  She has no rash on her face there is no stomatitis she has moist oral mucosa pool of saliva under her tongue is intact there is no parotid or swelling there is no no cervical lymphadenopathy.  She does not have sclerodactyly.  There is no synovitis in palpable joints of upper and lower extremities.  She has scar from her TKA " on the left side.  Her right knee is warmer to she at this point admitted to having had pain in the right knee.  This is with activity no swelling has been noted.  There is no loss of finger pulp, there is no vasculopathic lesions of the toes of the digits.    Labs / Imaging (select studies)     Rheumatoid Factor   Date Value Ref Range Status   10/23/2019 <14 <14 IU/mL Final     Comment:     Lab test performed by:  Lab Mnemonic:  [facility]  [address] [facility] [place] [address]  Hema Dillon M.D.  [facility] Specimen received date and time: 23-OCT-2019 13:34:00.00       C3 Complement   Date Value Ref Range Status   12/18/2020 130 83 - 193 mg/dL Final     Comment:     Lab test performed by:  Lab Mnemonic:[facility]  [address] [place] [address]  Hema Dillon M.D.  [facility] Specimen received date and time: 18-DEC-2020 11:48:00.00     10/23/2019 136 83 - 193 mg/dL Final     Comment:     Lab test performed by:  Lab Mnemonic:[facility]  [address] [facility] [place] [address]  Hema Dillon M.D.  [facility] Specimen received date and time: 23-OCT-2019 13:34:00.00       C4 Complement   Date Value Ref Range Status   12/18/2020 20 15 - 57 mg/dL Final     Comment:     MULTIPLE TESTING PRIORITIES; ROUTINE TESTING TO FOLLOW.  Lab test performed by:  Lab Mnemonic:[facility]  [address] [place] [address]  Hema Dillon M.D.  [facility] Specimen received date and time: 18-DEC-2020 11:48:00.00        Hemoglobin   Date Value Ref Range Status   09/02/2020 14.1 12.0 - 16.0 g/dL Final     Comment:     _  Unit of Measure:   g/dL     10/23/2019 13.7 11.7 - 15.5 gm/dL Final     Comment:     Lab test performed by:  Lab Mnemonic:[facility]  [address] [facility] [place] [address]  Hema Dillon M.D.  [facility] Specimen received date and time: 23-OCT-2019 13:34:00.00       Urea Nitrogen   Date Value  Ref Range Status   05/07/2024 15.1 8.0 - 23.0 mg/dL Final   03/28/2024 21.3 8.0 - 23.0 mg/dL Final   03/31/2021 18 7 - 25 mg/dL Final     Comment:     Lab test performed by:  Lab Mnemonic:  [facility]  [address] [place] [address]  Hema Dillon M.D.  [facility] Specimen received date and time: 01-APR-2021 02:12:00.00     09/02/2020 20 8 - 25 mg/dL Final     Comment:     _  Unit of Measure:   mg/dL     10/23/2019 19 7 - 25 mg/dL Final     Comment:     Lab test performed by:  Lab Mnemonic:[facility]  [address] [place] [address]  Hema Dillon M.D.  [facility] Specimen received date and time: 23-OCT-2019 13:34:00.00       Erythrocyte Sedimentation Rate   Date Value Ref Range Status   09/02/2020 22 <30 mm/hr Final     Comment:     _  Unit of Measure:   mm/hr       CRP   Date Value Ref Range Status   10/23/2019 4.0 <8.0 mg/L Final     Comment:     Lab test performed by:  Lab Mnemonic:[facility]  [facility]  [address] [facility] [place] [address]  Hema Dillon M.D.  [facility] Specimen received date and time: 23-OCT-2019 13:34:00.00       AST   Date Value Ref Range Status   05/07/2024 22 0 - 45 U/L Final     Comment:     Reference intervals for this test were updated on 6/12/2023 to more accurately reflect our healthy population. There may be differences in the flagging of prior results with similar values performed with this method. Interpretation of those prior results can be made in the context of the updated reference intervals.   03/28/2024 20 0 - 45 U/L Final     Comment:     Reference intervals for this test were updated on 6/12/2023 to more accurately reflect our healthy population. There may be differences in the flagging of prior results with similar values performed with this method. Interpretation of those prior results can be made in the context of the updated reference intervals.   09/02/2020 18 2 - 40 IU/L Final     Comment:      _  Unit of Measure:   IU/L       Albumin   Date Value Ref Range Status   05/07/2024 4.6 3.5 - 5.2 g/dL Final   03/28/2024 5.3 (H) 3.5 - 5.2 g/dL Final   09/02/2020 4.5 3.5 - 5.2 g/dL Final     Comment:     _  Unit of Measure:   g/dL     10/23/2019 4.6 3.8 - 4.8 gm/dL Final     Comment:     Lab test performed by:  Lab Mnemonic:[facility]  [address] [place] [address]  Hema Dillon M.D.  [facility] Specimen received date and time: 23-OCT-2019 13:34:00.00     10/23/2019 4.5 3.6 - 5.1 gm/dL Final     Comment:     Lab test performed by:  Lab Mnemonic:[facility]  [address] [facility] [address]  Hema Dillon M.D.  [facility] Specimen received date and time: 23-OCT-2019 13:34:00.00       Alkaline Phosphatase   Date Value Ref Range Status   05/07/2024 85 40 - 150 U/L Final     Comment:     Reference intervals for this test were updated on 11/14/2023 to more accurately reflect our healthy population. There may be differences in the flagging of prior results with similar values performed with this method. Interpretation of those prior results can be made in the context of the updated reference intervals.   03/28/2024 84 40 - 150 U/L Final     Comment:     Reference intervals for this test were updated on 11/14/2023 to more accurately reflect our healthy population. There may be differences in the flagging of prior results with similar values performed with this method. Interpretation of those prior results can be made in the context of the updated reference intervals.   09/02/2020 92 50 - 136 IU/L Final     Comment:     _  Unit of Measure:   IU/L       ALT   Date Value Ref Range Status   05/07/2024 26 0 - 50 U/L Final     Comment:     Reference intervals for this test were updated on 6/12/2023 to more accurately reflect our healthy population. There may be differences in the flagging of prior results with similar values performed with this method.  Interpretation of those prior results can be made in the context of the updated reference intervals.     03/28/2024 22 0 - 50 U/L Final     Comment:     Reference intervals for this test were updated on 6/12/2023 to more accurately reflect our healthy population. There may be differences in the flagging of prior results with similar values performed with this method. Interpretation of those prior results can be made in the context of the updated reference intervals.     09/02/2020 21 8 - 45 IU/L Final     Comment:     _  Unit of Measure:   IU/L       Rheumatoid Factor   Date Value Ref Range Status   10/23/2019 <14 <14 IU/mL Final     Comment:     Lab test performed by:  [facility]  [address] [place] [address]  Hema Dillon M.D.  [facility] Specimen received date and time: 23-OCT-2019 13:34:00.00            Immunization History     Immunization History   Administered Date(s) Administered    COVID-19 Bivalent 18+ (Moderna) 11/17/2022    COVID-19 Monovalent 18+ (Moderna) 04/21/2021, 05/19/2021, 12/10/2021    Flu, Unspecified 09/11/2012, 09/18/2013, 11/17/2014, 10/13/2017, 12/20/2018, 10/10/2020, 02/01/2022, 03/01/2023    HepB, Unspecified 01/05/1993, 02/03/1993, 09/03/1993    Hepatitis B, Adult 01/05/1993, 02/03/1993, 09/03/1993    Historical Measles-rubella 02/01/1972, 12/12/2005    Influenza (IIV3) PF 09/18/2013    Influenza Vaccine 18-64 (Flublok) 09/04/2019, 10/10/2020, 11/17/2022    Influenza Vaccine >6 months,quad, PF 12/20/2018    Influenza Vaccine, 6+MO IM (QUADRIVALENT W/PRESERVATIVES) 11/17/2014, 10/13/2017    MMR 02/01/1972, 12/12/2005    OPV, trivalent, live 09/01/1964, 11/01/1964, 01/01/1967, 03/01/1970, 01/01/1971    OPV, unspecified 09/01/1964, 11/01/1964, 01/01/1967, 03/01/1970, 01/01/1971    Pneumococcal, Unspecified 03/01/2023    TDAP (Adacel,Boostrix) 03/30/1961, 06/28/1961, 11/03/1961, 02/01/1963, 09/11/2012, 03/10/2022    Tdap (Adult) Unspecified  Formulation 03/30/1961, 06/28/1961, 11/03/1961, 02/01/1963, 12/15/2005    Zoster recombinant adjuvanted (SHINGRIX) 09/04/2019, 02/19/2021

## 2024-09-19 LAB
ALBUMIN SERPL BCG-MCNC: 4.6 G/DL (ref 3.5–5.2)
ALT SERPL W P-5'-P-CCNC: 34 U/L (ref 0–50)
C3 SERPL-MCNC: 127 MG/DL (ref 81–157)
C4 SERPL-MCNC: <1 MG/DL (ref 13–39)
CCP AB SER IA-ACNC: 1.1 U/ML
CREAT SERPL-MCNC: 1.06 MG/DL (ref 0.51–0.95)
CRP SERPL-MCNC: 6.37 MG/L
DSDNA AB SER-ACNC: <0.6 IU/ML
EGFRCR SERPLBLD CKD-EPI 2021: 59 ML/MIN/1.73M2
ENA SM IGG SER IA-ACNC: <0.7 U/ML
ENA SM IGG SER IA-ACNC: NEGATIVE
ENA SS-A AB SER IA-ACNC: 3.4 U/ML
ENA SS-A AB SER IA-ACNC: NEGATIVE
ENA SS-B IGG SER IA-ACNC: <0.6 U/ML
ENA SS-B IGG SER IA-ACNC: NEGATIVE
FERRITIN SERPL-MCNC: 172 NG/ML (ref 11–328)
HCV AB SERPL QL IA: NONREACTIVE
RHEUMATOID FACT SERPL-ACNC: <10 IU/ML
U1 SNRNP IGG SER IA-ACNC: <1.1 U/ML
U1 SNRNP IGG SER IA-ACNC: NEGATIVE

## 2024-11-05 ENCOUNTER — OFFICE VISIT (OUTPATIENT)
Dept: RHEUMATOLOGY | Facility: CLINIC | Age: 64
End: 2024-11-05
Payer: COMMERCIAL

## 2024-11-05 VITALS
DIASTOLIC BLOOD PRESSURE: 84 MMHG | HEART RATE: 65 BPM | OXYGEN SATURATION: 100 % | SYSTOLIC BLOOD PRESSURE: 136 MMHG | WEIGHT: 183.8 LBS | BODY MASS INDEX: 30.12 KG/M2

## 2024-11-05 DIAGNOSIS — M25.50 MULTIPLE JOINT PAIN: ICD-10-CM

## 2024-11-05 DIAGNOSIS — M47.812 CERVICAL SPONDYLOSIS: ICD-10-CM

## 2024-11-05 DIAGNOSIS — R76.8 ELEVATED ANTINUCLEAR ANTIBODY (ANA) LEVEL: Primary | ICD-10-CM

## 2024-11-05 PROCEDURE — 99214 OFFICE O/P EST MOD 30 MIN: CPT | Performed by: INTERNAL MEDICINE

## 2024-11-05 NOTE — PROGRESS NOTES
"      Rheumatology follow-up visit note     Wanda is a 64 year old female presents today for follow-up.    Wanda was seen today for recheck.    Diagnoses and all orders for this visit:    Elevated antinuclear antibody (ROBERTO) level    Multiple joint pain    Cervical spondylosis        This patient's positive ROBERTO does not seem to be associated with an active connective tissue disease this is discussed reassured.  She has noted off-and-on neck pain.  She has cervical spondylosis as far back as 2020 she had an MRI done elsewhere the report of which are available in the chart which were discussed with her and the indications for further evaluation at the spine clinic  she chose to defer that for now.  Follow-up in rheumatology as needed.       HPI    Wanda Alva is a 64 year old female is here for follow-up of   evaluation of 12 to 18-month long history of symptoms, positive ROBERTO that has been intermittently positive for the past several years.  She noted that during the past year or so quite frequently she is woken up from sleep because of \"flulike symptoms \".  It feels as if she is certainly achy all over.  She is not sure if she feels warmer she is never checked her temperature as she does not have diaphoresis.  She can avoid having these nights time symptoms if she were to take Tylenol.  Prior to that she used to take ibuprofen but then she had renal impairment and was asked not to continue that.  She reports history of dryness of mouth dryness of eyes vaginal dryness skin dryness.  She has not had swelling of the mandibular or parotid regions.  She has not had photosensitivity.  She had developed mouth ulcers talk to her dentist and none were found but she is sure that over the past 18 months she has 3 or 4 such episodes which are like canker sores.  She has not had iritis like symptoms she has a rash on her lower abdomen, proximal lower extremities was seen in dermatology 3 years ago and was told that this " "will resolve in about 12 months it has not.  There is no sun sensitivity however.  There is no pleuritic symptoms.  She has not had PE DVT she has had 2 pregnancies no miscarriages.  She has no history of seizure disorder.  No kidney issues.  She has not had Raynaud's.  She is familiar with it as her sister, younger has lupus and she has Raynaud's.  In 2017 she had left TKA that needed to be revised in 2020 as it had been \"bad feet\".  She would occasionally get symptoms there.  There are no other significant joint symptoms in the upper extremities, shoulders neck or back hips and knees, her feet often are painful first thing in the morning her stiffness ranges between 5 to 10 minutes.  She has not observed swelling in any of her joint areas.  She describes no psoriasis ulcerative colitis Crohn's disease herself or the family.  She is not a smoker.  She used to work as a nursing assistant..         DETAILED EXAMINATION  11/05/24  :    Vitals:    11/05/24 1229   BP: 136/84   Pulse: 65   SpO2: 100%   Weight: 83.4 kg (183 lb 12.8 oz)     Alert oriented. Head including the face is examined for malar rash, heliotropes, scarring, lupus pernio. Eyes examined for redness such as in episcleritis/scleritis, periorbital lesions.   Neck/ Face examined for parotid gland swelling, range of motion of neck.  Left upper and lower and right upper and lower extremities examined for tenderness, swelling, warmth of the appendicular joints, range of motion, edema, rash.  Some of the important findings included: she does not have evidence of synovitis in the palpable joints of the upper extremities.  No significant deformities of the digits.  no Heberden nodes.  Range of motion of the shoulders  show full abduction.  No JLT effusion or warmth of the knees.  she does not have dactylitis of the digits.  There is no rash on her face is no sclerodactyly periungual erythema.     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  Left TKA [place] 12/19/2018       Past Surgical History:   Procedure Laterality Date    ARTHROPLASTY REVISION KNEE Left 9/23/2020    Procedure: LEFT KNEE REVISION POLYETHYLENE EXCHANGE AND PATELLAR  DEBRIDEMENT;  Surgeon: Benjamín Ballard MD;  Location: [facility];  Service: Orthopedics    JOINT REPLACEMENT Left 2018    knee      Past Medical History:   Diagnosis Date    Arthritis     Carpal tunnel syndrome     right hand    Dry eye      Allergies   Allergen Reactions    Blood-Group Specific Substance Other (See Comments)     Patient has Nonspecific antibody. Blood products may be delayed. Draw patient 24 hours prior to transfusion. Draw one red top and two purple top tubes for all type and screen orders.    Nickel Rash     Current Outpatient Medications   Medication Sig Dispense Refill    acetaminophen (TYLENOL) 500 MG tablet [ACETAMINOPHEN (TYLENOL) 500 MG TABLET] Take 2 tablets (1,000 mg total) by mouth 3 (three) times a day.  0    amoxicillin (AMOXIL) 500 MG capsule Take 4 capsules by mouth As directed prior to dental procedures      calcium citrate-vitamin D3 (CITRACAL + D) 315 mg- 250 unit per tablet Take 1 tablet by mouth daily       Collagen-Vitamin C-Biotin (COLLAGEN 1500/C) 500-50-0.8 MG CAPS       estradiol (ESTRACE) 0.1 MG/GM vaginal cream See Admin Instructions      glucosamine 500 MG CAPS Super Joint Support, by [facility] (Patient not taking: Reported on 9/18/2024)      lactobacillus rhamnosus, GG, (CULTURELL)  capsule Take 1 capsule by mouth 2 times daily      MAGNESIUM BISGLYCINATE PO  (Patient not taking: Reported on 9/18/2024)      oxyBUTYnin ER (DITROPAN XL) 5 MG 24 hr tablet TAKE 1 TABLET(5 MG) BY MOUTH DAILY (Patient not taking: Reported on 9/18/2024) 90 tablet 2    scopolamine (TRANSDERM) 1 MG/3DAYS 72 hr patch Place 1 patch onto the skin every 72 hours 4 patch 0    White Petrolatum-Mineral Oil (REFRESH LACRI-LUBE) OINT Apply 1 Application. to eye every 4 hours as needed (dry eyes) 28 g 11     family history includes Cerebrovascular Disease in her father and paternal grandfather; Coronary Artery Disease Early Onset in her maternal grandfather; Diabetes Type 2  in her father, mother, and paternal grandfather; Heart Disease in her father and paternal grandfather; Hyperlipidemia in her brother; Lupus in her paternal grandmother and sister; No Known Problems in her maternal grandmother; Obesity in her mother; Ovarian Cancer in her mother; Pulmonary Embolism in her mother.  Social Connections: Unknown (3/21/2024)    Social Connection and Isolation Panel [NHANES]     Frequency of Communication with Friends and Family: Not on file     Frequency of Social Gatherings with Friends and Family: Once a week     Attends Baptism Services: Not on file     Active Member of Clubs or Organizations: Not on file     Attends Club or Organization Meetings: Not on file     Marital Status: Not on file          WBC Count   Date Value Ref Range Status   09/18/2024 6.0 4.0 - 11.0 10e3/uL Final     RBC Count   Date Value Ref Range Status   09/18/2024 4.59 3.80 - 5.20 10e6/uL Final     Hemoglobin   Date Value Ref Range Status   09/18/2024 13.5 11.7 - 15.7 g/dL Final     Hematocrit   Date Value Ref Range Status   09/18/2024 39.0 35.0 - 47.0 % Final     MCV   Date Value Ref Range Status   09/18/2024 85 78 - 100 fL Final     MCH   Date Value Ref Range Status   09/18/2024 29.4 26.5 - 33.0 pg Final     Platelet Count   Date Value Ref Range Status    09/18/2024 233 150 - 450 10e3/uL Final     % Lymphocytes   Date Value Ref Range Status   09/18/2024 33 % Final     AST   Date Value Ref Range Status   05/07/2024 22 0 - 45 U/L Final     Comment:     Reference intervals for this test were updated on 6/12/2023 to more accurately reflect our healthy population. There may be differences in the flagging of prior results with similar values performed with this method. Interpretation of those prior results can be made in the context of the updated reference intervals.     ALT   Date Value Ref Range Status   09/18/2024 34 0 - 50 U/L Final     Albumin   Date Value Ref Range Status   09/18/2024 4.6 3.5 - 5.2 g/dL Final   09/02/2020 4.5 3.5 - 5.2 g/dL Final     Comment:     _  Unit of Measure:   g/dL       Alkaline Phosphatase   Date Value Ref Range Status   05/07/2024 85 40 - 150 U/L Final     Comment:     Reference intervals for this test were updated on 11/14/2023 to more accurately reflect our healthy population. There may be differences in the flagging of prior results with similar values performed with this method. Interpretation of those prior results can be made in the context of the updated reference intervals.     Creatinine   Date Value Ref Range Status   09/18/2024 1.06 (H) 0.51 - 0.95 mg/dL Final     GFR Estimate   Date Value Ref Range Status   09/18/2024 59 (L) >60 mL/min/1.73m2 Final     Comment:     eGFR calculated using 2021 CKD-EPI equation.   09/02/2020 >60 >60 Final     Comment:     _  Unit of Measure:   ml/min/1.73m2       GFREstimate If Black   Date Value Ref Range Status   09/02/2020 >60 >60 ml/min/1.73m2 Final     Creatinine Urine mg/dL   Date Value Ref Range Status   12/18/2020 37 20 - 275 mg/dL Final     Comment:     Lab test performed by:  Lab Mnemonic:J[facility]  [facility]  [address] [place] [address]  Hema Dillon M.D.  QUEST Specimen received date and time: 18-DEC-2020 11:48:00.00       Erythrocyte Sedimentation  Rate   Date Value Ref Range Status   09/18/2024 18 0 - 30 mm/hr Final     CRP   Date Value Ref Range Status   10/23/2019 4.0 <8.0 mg/L Final     Comment:     Lab test performed by:  Lab Mnemonic:J[facility]  [facility]  [address] [place] [address]  Hema Dillon M.D.  QUEST Specimen received date and time: 23-OCT-2019 13:34:00.00

## 2024-12-05 ENCOUNTER — MYC MEDICAL ADVICE (OUTPATIENT)
Dept: FAMILY MEDICINE | Facility: CLINIC | Age: 64
End: 2024-12-05
Payer: COMMERCIAL

## 2025-01-08 ENCOUNTER — PATIENT OUTREACH (OUTPATIENT)
Dept: CARE COORDINATION | Facility: CLINIC | Age: 65
End: 2025-01-08
Payer: COMMERCIAL

## 2025-02-03 ENCOUNTER — PATIENT OUTREACH (OUTPATIENT)
Dept: CARE COORDINATION | Facility: CLINIC | Age: 65
End: 2025-02-03
Payer: COMMERCIAL

## 2025-03-26 SDOH — HEALTH STABILITY: PHYSICAL HEALTH: ON AVERAGE, HOW MANY DAYS PER WEEK DO YOU ENGAGE IN MODERATE TO STRENUOUS EXERCISE (LIKE A BRISK WALK)?: 3 DAYS

## 2025-03-26 SDOH — HEALTH STABILITY: PHYSICAL HEALTH: ON AVERAGE, HOW MANY MINUTES DO YOU ENGAGE IN EXERCISE AT THIS LEVEL?: 60 MIN

## 2025-03-26 ASSESSMENT — SOCIAL DETERMINANTS OF HEALTH (SDOH): HOW OFTEN DO YOU GET TOGETHER WITH FRIENDS OR RELATIVES?: THREE TIMES A WEEK

## 2025-03-31 ENCOUNTER — ANCILLARY PROCEDURE (OUTPATIENT)
Dept: GENERAL RADIOLOGY | Facility: CLINIC | Age: 65
End: 2025-03-31
Attending: FAMILY MEDICINE
Payer: COMMERCIAL

## 2025-03-31 ENCOUNTER — OFFICE VISIT (OUTPATIENT)
Dept: FAMILY MEDICINE | Facility: CLINIC | Age: 65
End: 2025-03-31
Attending: FAMILY MEDICINE
Payer: COMMERCIAL

## 2025-03-31 ENCOUNTER — MYC MEDICAL ADVICE (OUTPATIENT)
Dept: FAMILY MEDICINE | Facility: CLINIC | Age: 65
End: 2025-03-31

## 2025-03-31 VITALS
OXYGEN SATURATION: 94 % | WEIGHT: 188.8 LBS | HEIGHT: 65 IN | RESPIRATION RATE: 16 BRPM | DIASTOLIC BLOOD PRESSURE: 80 MMHG | HEART RATE: 63 BPM | TEMPERATURE: 98 F | SYSTOLIC BLOOD PRESSURE: 128 MMHG | BODY MASS INDEX: 31.46 KG/M2

## 2025-03-31 DIAGNOSIS — I05.9 MITRAL VALVE DISEASE: ICD-10-CM

## 2025-03-31 DIAGNOSIS — M54.9 UPPER BACK PAIN: ICD-10-CM

## 2025-03-31 DIAGNOSIS — R20.2 PARESTHESIA: ICD-10-CM

## 2025-03-31 DIAGNOSIS — R63.5 WEIGHT GAIN: ICD-10-CM

## 2025-03-31 DIAGNOSIS — I44.1 MOBITZ TYPE 1 SECOND DEGREE ATRIOVENTRICULAR BLOCK: ICD-10-CM

## 2025-03-31 DIAGNOSIS — M54.2 NECK PAIN: ICD-10-CM

## 2025-03-31 DIAGNOSIS — R11.0 NAUSEA: ICD-10-CM

## 2025-03-31 DIAGNOSIS — M25.561 CHRONIC PAIN OF RIGHT KNEE: ICD-10-CM

## 2025-03-31 DIAGNOSIS — Z96.652 STATUS POST TOTAL LEFT KNEE REPLACEMENT: ICD-10-CM

## 2025-03-31 DIAGNOSIS — Z00.00 ROUTINE GENERAL MEDICAL EXAMINATION AT A HEALTH CARE FACILITY: ICD-10-CM

## 2025-03-31 DIAGNOSIS — R73.03 PREDIABETES: ICD-10-CM

## 2025-03-31 DIAGNOSIS — Z12.11 SCREEN FOR COLON CANCER: ICD-10-CM

## 2025-03-31 DIAGNOSIS — G89.29 CHRONIC PAIN OF RIGHT KNEE: ICD-10-CM

## 2025-03-31 DIAGNOSIS — R25.2 LEG CRAMP: ICD-10-CM

## 2025-03-31 DIAGNOSIS — E78.2 MIXED HYPERLIPIDEMIA: Primary | ICD-10-CM

## 2025-03-31 PROBLEM — G56.00 CARPAL TUNNEL SYNDROME: Status: ACTIVE | Noted: 2022-05-16

## 2025-03-31 LAB
ANION GAP SERPL CALCULATED.3IONS-SCNC: 12 MMOL/L (ref 7–15)
BASOPHILS # BLD AUTO: 0 10E3/UL (ref 0–0.2)
BASOPHILS NFR BLD AUTO: 0 %
BUN SERPL-MCNC: 23.4 MG/DL (ref 8–23)
CALCIUM SERPL-MCNC: 9.9 MG/DL (ref 8.8–10.4)
CHLORIDE SERPL-SCNC: 102 MMOL/L (ref 98–107)
CHOLEST SERPL-MCNC: 261 MG/DL
CREAT SERPL-MCNC: 1.02 MG/DL (ref 0.51–0.95)
EGFRCR SERPLBLD CKD-EPI 2021: 61 ML/MIN/1.73M2
EOSINOPHIL # BLD AUTO: 0.1 10E3/UL (ref 0–0.7)
EOSINOPHIL NFR BLD AUTO: 2 %
ERYTHROCYTE [DISTWIDTH] IN BLOOD BY AUTOMATED COUNT: 12.2 % (ref 10–15)
FASTING STATUS PATIENT QL REPORTED: ABNORMAL
FASTING STATUS PATIENT QL REPORTED: ABNORMAL
GLUCOSE SERPL-MCNC: 86 MG/DL (ref 70–99)
HCO3 SERPL-SCNC: 26 MMOL/L (ref 22–29)
HCT VFR BLD AUTO: 42 % (ref 35–47)
HDLC SERPL-MCNC: 59 MG/DL
HGB BLD-MCNC: 14.3 G/DL (ref 11.7–15.7)
IMM GRANULOCYTES # BLD: 0 10E3/UL
IMM GRANULOCYTES NFR BLD: 0 %
LDLC SERPL CALC-MCNC: 155 MG/DL
LYMPHOCYTES # BLD AUTO: 2.1 10E3/UL (ref 0.8–5.3)
LYMPHOCYTES NFR BLD AUTO: 30 %
MCH RBC QN AUTO: 29.7 PG (ref 26.5–33)
MCHC RBC AUTO-ENTMCNC: 34 G/DL (ref 31.5–36.5)
MCV RBC AUTO: 87 FL (ref 78–100)
MONOCYTES # BLD AUTO: 0.7 10E3/UL (ref 0–1.3)
MONOCYTES NFR BLD AUTO: 10 %
NEUTROPHILS # BLD AUTO: 4.2 10E3/UL (ref 1.6–8.3)
NEUTROPHILS NFR BLD AUTO: 59 %
NONHDLC SERPL-MCNC: 202 MG/DL
PLATELET # BLD AUTO: 258 10E3/UL (ref 150–450)
POTASSIUM SERPL-SCNC: 4.3 MMOL/L (ref 3.4–5.3)
RBC # BLD AUTO: 4.82 10E6/UL (ref 3.8–5.2)
SODIUM SERPL-SCNC: 140 MMOL/L (ref 135–145)
TRIGL SERPL-MCNC: 234 MG/DL
TSH SERPL DL<=0.005 MIU/L-ACNC: 0.9 UIU/ML (ref 0.3–4.2)
WBC # BLD AUTO: 7.2 10E3/UL (ref 4–11)

## 2025-03-31 PROCEDURE — 84443 ASSAY THYROID STIM HORMONE: CPT | Performed by: FAMILY MEDICINE

## 2025-03-31 PROCEDURE — 90471 IMMUNIZATION ADMIN: CPT | Performed by: FAMILY MEDICINE

## 2025-03-31 PROCEDURE — 90677 PCV20 VACCINE IM: CPT | Performed by: FAMILY MEDICINE

## 2025-03-31 PROCEDURE — 80061 LIPID PANEL: CPT | Performed by: FAMILY MEDICINE

## 2025-03-31 PROCEDURE — 99213 OFFICE O/P EST LOW 20 MIN: CPT | Mod: 25 | Performed by: FAMILY MEDICINE

## 2025-03-31 PROCEDURE — 72040 X-RAY EXAM NECK SPINE 2-3 VW: CPT | Mod: TC | Performed by: RADIOLOGY

## 2025-03-31 PROCEDURE — 3079F DIAST BP 80-89 MM HG: CPT | Performed by: FAMILY MEDICINE

## 2025-03-31 PROCEDURE — 80048 BASIC METABOLIC PNL TOTAL CA: CPT | Performed by: FAMILY MEDICINE

## 2025-03-31 PROCEDURE — 3074F SYST BP LT 130 MM HG: CPT | Performed by: FAMILY MEDICINE

## 2025-03-31 PROCEDURE — 99396 PREV VISIT EST AGE 40-64: CPT | Mod: 25 | Performed by: FAMILY MEDICINE

## 2025-03-31 PROCEDURE — 36415 COLL VENOUS BLD VENIPUNCTURE: CPT | Performed by: FAMILY MEDICINE

## 2025-03-31 PROCEDURE — 85025 COMPLETE CBC W/AUTO DIFF WBC: CPT | Mod: QW | Performed by: FAMILY MEDICINE

## 2025-03-31 RX ORDER — AMOXICILLIN 500 MG/1
2000 CAPSULE ORAL ONCE
Qty: 4 CAPSULE | Refills: 0 | Status: SHIPPED | OUTPATIENT
Start: 2025-03-31 | End: 2025-03-31

## 2025-03-31 RX ORDER — SCOPOLAMINE 1 MG/3D
1 PATCH, EXTENDED RELEASE TRANSDERMAL
Qty: 5 PATCH | Refills: 0 | Status: SHIPPED | OUTPATIENT
Start: 2025-03-31

## 2025-03-31 NOTE — PATIENT INSTRUCTIONS
Patient Education   Preventive Care Advice   This is general advice given by our system to help you stay healthy. However, your care team may have specific advice just for you. Please talk to your care team about your preventive care needs.  Nutrition  Eat 5 or more servings of fruits and vegetables each day.  Try wheat bread, brown rice and whole grain pasta (instead of white bread, rice, and pasta).  Get enough calcium and vitamin D. Check the label on foods and aim for 100% of the RDA (recommended daily allowance).  Lifestyle  Exercise at least 150 minutes each week  (30 minutes a day, 5 days a week).  Do muscle strengthening activities 2 days a week. These help control your weight and prevent disease.  No smoking.  Wear sunscreen to prevent skin cancer.  Have a dental exam and cleaning every 6 months.  Yearly exams  See your health care team every year to talk about:  Any changes in your health.  Any medicines your care team has prescribed.  Preventive care, family planning, and ways to prevent chronic diseases.  Shots (vaccines)   HPV shots (up to age 26), if you've never had them before.  Hepatitis B shots (up to age 59), if you've never had them before.  COVID-19 shot: Get this shot when it's due.  Flu shot: Get a flu shot every year.  Tetanus shot: Get a tetanus shot every 10 years.  Pneumococcal, hepatitis A, and RSV shots: Ask your care team if you need these based on your risk.  Shingles shot (for age 50 and up)  General health tests  Diabetes screening:  Starting at age 35, Get screened for diabetes at least every 3 years.  If you are younger than age 35, ask your care team if you should be screened for diabetes.  Cholesterol test: At age 39, start having a cholesterol test every 5 years, or more often if advised.  Bone density scan (DEXA): At age 50, ask your care team if you should have this scan for osteoporosis (brittle bones).  Hepatitis C: Get tested at least once in your life.  STIs (sexually  transmitted infections)  Before age 24: Ask your care team if you should be screened for STIs.  After age 24: Get screened for STIs if you're at risk. You are at risk for STIs (including HIV) if:  You are sexually active with more than one person.  You don't use condoms every time.  You or a partner was diagnosed with a sexually transmitted infection.  If you are at risk for HIV, ask about PrEP medicine to prevent HIV.  Get tested for HIV at least once in your life, whether you are at risk for HIV or not.  Cancer screening tests  Cervical cancer screening: If you have a cervix, begin getting regular cervical cancer screening tests starting at age 21.  Breast cancer scan (mammogram): If you've ever had breasts, begin having regular mammograms starting at age 40. This is a scan to check for breast cancer.  Colon cancer screening: It is important to start screening for colon cancer at age 45.  Have a colonoscopy test every 10 years (or more often if you're at risk) Or, ask your provider about stool tests like a FIT test every year or Cologuard test every 3 years.  To learn more about your testing options, visit:   .  For help making a decision, visit:   https://bit.ly/nl52546.  Prostate cancer screening test: If you have a prostate, ask your care team if a prostate cancer screening test (PSA) at age 55 is right for you.  Lung cancer screening: If you are a current or former smoker ages 50 to 80, ask your care team if ongoing lung cancer screenings are right for you.  For informational purposes only. Not to replace the advice of your health care provider. Copyright   2023 [employer]. All rights reserved. Clinically reviewed by the [facility] Transitions Program. [employer]ost[employer] [id number] - REV 01/24.

## 2025-03-31 NOTE — PROGRESS NOTES
Preventive Care Visit  [facility]  Kristine Valadez MD, Family Medicine  Mar 31, 2025      Assessment & Plan   Problem List Items Addressed This Visit       Prediabetes    Relevant Orders    CBC with Platelets & Differential (Completed)    Mixed hyperlipidemia - Primary    Relevant Orders    Lipid panel reflex to direct LDL Non-fasting    Mobitz type 1 second degree atrioventricular block    Relevant Orders    CBC with Platelets & Differential (Completed)     Other Visit Diagnoses       Screen for colon cancer        Relevant Orders    Fecal colorectal cancer screen FIT - Future (S+30)    Paresthesia        Relevant Orders    Basic metabolic panel    CBC with Platelets & Differential (Completed)    Neck pain        Relevant Orders    Physical Therapy  Referral    XR Cervical Spine 2/3 Views    Weight gain        Relevant Orders    TSH with free T4 reflex    Basic metabolic panel    Leg cramp        Relevant Orders    Physical Therapy  Referral    Upper back pain        Relevant Orders    Physical Therapy  Referral    Nausea        Relevant Medications    scopolamine (TRANSDERM) 1 MG/3DAYS 72 hr patch    Chronic pain of right knee        Mitral valve disease        Relevant Orders    Echocardiogram Complete    Basic metabolic panel    Status post total left knee replacement        Relevant Medications    amoxicillin (AMOXIL) 500 MG capsule    Routine general medical examination at a health care facility            Nonfasting labs       Assessment & Plan  Screen for colon cancer:  - Colon cancer screening is valid until December 3, 2025.    Paresthesia:  - Order neck X-rays. Consider physical therapy for head to toe, including neck pain, leg cramp, and upper back pain.    Neck pain:  - Order neck X-rays. Consider physical therapy for head to toe, including neck pain, leg cramp, and upper back pain.    Weight gain:  - Discuss potential medications for weight loss  "in a follow-up appointment.    Leg cramp:  - Consider physical therapy for head to toe, including neck pain, leg cramp, and upper back pain.    Upper back pain:  - Consider physical therapy for head to toe, including neck pain, leg cramp, and upper back pain.    Mitral valve disease:  - Order echocardiogram.    Routine general medical examination at a health care facility:  - Order CBC, basic metabolic panel, and thyroid test.    Prediabetes:  - Order hemoglobin A1c test.           The 10-year ASCVD risk score (Charmaine TEE, et al., 2019) is: 5.5%    Values used to calculate the score:      Age: 64 years      Sex: Female      Is Non- : No      Diabetic: No      Tobacco smoker: No      Systolic Blood Pressure: 128 mmHg      Is BP treated: No      HDL Cholesterol: 68 mg/dL      Total Cholesterol: 278 mg/dL           BMI  Estimated body mass index is 31.42 kg/m  as calculated from the following:    Height as of this encounter: 1.651 m (5' 5\").    Weight as of this encounter: 85.6 kg (188 lb 12.8 oz).   Weight management plan: Discussed healthy diet and exercise guidelines    Counseling  Appropriate preventive services were addressed with this patient via screening, questionnaire, or discussion as appropriate for fall prevention, nutrition, physical activity, Tobacco-use cessation, social engagement, weight loss and cognition.  Checklist reviewing preventive services available has been given to the patient.  Reviewed patient's diet, addressing concerns and/or questions.   She is at risk for lack of exercise and has been provided with information to increase physical activity for the benefit of her well-being.           Subjective   Wanda is a 64 year old, presenting for the following:  Physical        3/31/2025    12:48 PM   Additional Questions   Roomed by Lou DURAN  History of Present Illness-  - Wanda Alva, 64-year-old female.  - Reports tingling sensation in the back, described " as bugs crawling, persisting for 5 months.  - Experiences difficulty lifting her head, requiring support with her hand.  - Occasional sharp jabs up the back of the head.  - History of lipoma removed from the arm, described as a bulging, movable mass.  Reconciled outside problem list which showed an atypical lipoma however pathology report was a normal lipoma.  - Previous referral from an arthritis doctor, lupus ruled out.  - History of Mobitz type 1 second degree AV block and mitral valve disorder noted in past records.  - Ovarian mass previously monitored, now resolved.  - History of wearing a Holter monitor, reason unspecified.  - Reports occasional heartburn.  - History of chondromalacia of the patella.  - Previous coronary calcium score of 0 in 2022.  - Reports leg cramps, particularly at night, and has been taking magnesium and pickle juice to alleviate symptoms.  - History of plantar fasciitis, now resolved.         Advance Care Planning  Patient has a Health Care Directive on file  Advance care planning document is on file and is current.      3/26/2025   General Health   How would you rate your overall physical health? (!) FAIR   Feel stress (tense, anxious, or unable to sleep) Only a little   (!) STRESS CONCERN      3/26/2025   Nutrition   Three or more servings of calcium each day? (!) I DON'T KNOW   Diet: Regular (no restrictions)   How many servings of fruit and vegetables per day? (!) 2-3   How many sweetened beverages each day? 0-1         3/26/2025   Exercise   Days per week of moderate/strenous exercise 3 days   Average minutes spent exercising at this level 60 min         3/26/2025   Social Factors   Frequency of gathering with friends or relatives Three times a week   Worry food won't last until get money to buy more No   Food not last or not have enough money for food? No   Do you have housing? (Housing is defined as stable permanent housing and does not include staying ouside in a car, in a tent,  in an abandoned building, in an overnight shelter, or couch-surfing.) Yes   Are you worried about losing your housing? No   Lack of transportation? No   Unable to get utilities (heat,electricity)? No         3/26/2025   Fall Risk   Fallen 2 or more times in the past year? No   Trouble with walking or balance? No          3/26/2025   Dental   Dentist two times every year? Yes           3/21/2024   TB Screening   Were you born outside of the [place]? No           Today's PHQ-2 Score:       3/31/2025    12:36 PM   PHQ-2 ( 1999 [employer])   Q1: Little interest or pleasure in doing things 0   Q2: Feeling down, depressed or hopeless 0   PHQ-2 Score 0    Q1: Little interest or pleasure in doing things Not at all   Q2: Feeling down, depressed or hopeless Not at all   PHQ-2 Score 0       Patient-reported           3/26/2025   Substance Use   Alcohol more than 3/day or more than 7/wk No   Do you use any other substances recreationally? No     Social History     Tobacco Use    Smoking status: Never     Passive exposure: Never    Smokeless tobacco: Never   Vaping Use    Vaping status: Never Used   Substance Use Topics    Alcohol use: Not Currently    Drug use: Never           3/10/2022   LAST FHS-7 RESULTS   1st degree relative breast or ovarian cancer Yes    Any relative bilateral breast cancer No    Any male have breast cancer No    Any ONE woman have BOTH breast AND ovarian cancer Yes    Any woman with breast cancer before 50yrs No    2 or more relatives with breast AND/OR ovarian cancer No    2 or more relatives with breast AND/OR bowel cancer No        Proxy-reported                3/26/2025   STI Screening   New sexual partner(s) since last STI/HIV test? No     History of abnormal Pap smear: No - age 30- 64 PAP with HPV every 5 years recommended       ASCVD Risk   The 10-year ASCVD risk score (Charmaine TEE, et al., 2019) is: 5.5%    Values used to calculate the score:      Age: 64 years      Sex: Female      Is Non-  ": No      Diabetic: No      Tobacco smoker: No      Systolic Blood Pressure: 128 mmHg      Is BP treated: No      HDL Cholesterol: 68 mg/dL      Total Cholesterol: 278 mg/dL           Reviewed and updated as needed this visit by Provider                             Objective    Exam  /80 (BP Location: Right arm, Patient Position: Sitting)   Pulse 63   Temp 98  F (36.7  C) (Tympanic)   Resp 16   Ht 1.651 m (5' 5\")   Wt 85.6 kg (188 lb 12.8 oz)   LMP  (LMP Unknown)   SpO2 94%   BMI 31.42 kg/m     Estimated body mass index is 31.42 kg/m  as calculated from the following:    Height as of this encounter: 1.651 m (5' 5\").    Weight as of this encounter: 85.6 kg (188 lb 12.8 oz).    Physical Exam          Signed Electronically by: Kristine Valadez MD    "

## 2025-04-02 ENCOUNTER — THERAPY VISIT (OUTPATIENT)
Dept: PHYSICAL THERAPY | Facility: REHABILITATION | Age: 65
End: 2025-04-02
Payer: COMMERCIAL

## 2025-04-02 DIAGNOSIS — M54.2 NECK PAIN: ICD-10-CM

## 2025-04-02 DIAGNOSIS — M54.9 UPPER BACK PAIN: ICD-10-CM

## 2025-04-02 DIAGNOSIS — R25.2 LEG CRAMP: ICD-10-CM

## 2025-04-02 PROCEDURE — 97161 PT EVAL LOW COMPLEX 20 MIN: CPT | Mod: GP | Performed by: PHYSICAL THERAPIST

## 2025-04-02 PROCEDURE — 97140 MANUAL THERAPY 1/> REGIONS: CPT | Mod: GP | Performed by: PHYSICAL THERAPIST

## 2025-04-02 PROCEDURE — 97110 THERAPEUTIC EXERCISES: CPT | Mod: GP | Performed by: PHYSICAL THERAPIST

## 2025-04-02 NOTE — PROGRESS NOTES
PHYSICAL THERAPY EVALUATION  Type of Visit: Evaluation       Fall Risk Screen:  Fall screen completed by: PT  Have you fallen 2 or more times in the past year?: No  Have you fallen and had an injury in the past year?: Yes  Timed Up and Go score (seconds): 13  Is patient a fall risk?: No    Subjective         Presenting condition or subjective complaint: Weakness lifting head from pillow and pain with jarring like a pot hole in the road.  Date of onset: 03/31/25    Relevant medical history:     Dates & types of surgery: Left TKR    Prior diagnostic imaging/testing results: X-ray     Prior therapy history for the same diagnosis, illness or injury: No      Prior Level of Function  Transfers: Independent  Ambulation: Independent  ADL: Independent    Living Environment  Social support: With a significant other or spouse   Type of home: House; 2-story   Stairs to enter the home: Yes 2 Is there a railing: No     Ramp: No   Stairs inside the home: Yes 13 Is there a railing: Yes     Help at home: None  Equipment owned:       Employment: No    Hobbies/Interests: Pickleball-  [employer] workouts. Low impact    Patient goals for therapy: Lift head off pilliw to sleep better    Pain assessment: Pain present  See objective evaluation for additional pain details     Objective   SPINE EVALUATION  PAIN: Pain Level at Rest:  just stiffness  Pain Level with Use: 9/10  Pain Location: cervical spine and thoracic spine  Pain Quality: Sharp and stiffness otherwise  Pain is Exacerbated By: jarring motions  Pain is Relieved By: massage, icy/hot, ibuprofen tylenol.    POSTURE: Standing Posture: Rounded shoulders, Forward head, Thoracic kyphosis flat  Sitting Posture: Rounded shoulders, Forward head, Thoracic kyphosis flat    GAIT:   Weightbearing Status:  full  Assistive Device(s): None  Gait Deviations: Nina decreased    BALANCE/PROPRIOCEPTION: NT    ROM:   (Degrees) Left AROM Right AROM   Cervical Flexion 55   Cervical Extension 55   Cervical  Side bend 15 15   Cervical Rotation 50 50   Cervical Protrusion WNL   Cervical Retraction Min loss   Thoracic Flexion WFL   Thoracic Extension    Thoracic Rotation mod mod   Lumbar Side Bending mod mod   Lumbar Rotation mod mod   Lumbar Flexion min   Lumbar Extension min    Left AROM Right AROM  Left PROM Right PROM   Hip Flexion   WFL WFL   Hip Extension       Hip Abduction       Hip Adduction       Hip Internal Rotation   WFL WFL   Hip External Rotation   WFL WFL   Knee Flexion WFL WFL     Knee Extension WFL WFL       STRENGTH (MYOTOMES):  */5 Left Right   Cervical Flexion (C1-2) 5 5   Cervical Side bending (C3) 5 5   Shoulder Elevation (C4) 5 5   Shoulder Abduction (C5) 5 5   Elbow Flexion (C6) 5 5   Elbow Extension (C7) 5 5   Wrist Flexion (C7)     Wrist Extension (C6)     Thumb abduction (C8) 5 5   Finger Abduction (T1) 5 5   Hip Flexion (L2) 4 4   Hip Extension (L3)     Hip Abduction 4 4   Hip adduction 4 4   Hip Internal Rotation     Hip External Rotation     Knee Flexion 5 5   Knee Extension 5 5   Dorsiflexion (L4) 5 5   Great Toe Extension (L5)     Plantarflexion (S1)       DTR'S: NT  CORD SIGNS: NT  DERMATOMES: NT  FLEXIBILITY: Decreased scalenes L, Decreased pectoralis minor L, Decreased scalenes R, Decreased pectoralis minor R  Decreased piriformis R, Decreased hamstrings R, Decreased gastroc R   SPECIAL TESTS:   Cervical Special Tests Left Right   Cervical compression     Cervical distraction + local relief + local relief   Spurling's test     Shoulder abduction sign     Deep neck flexor endurance test +   Upper cervical rotation     Sharper-Vimal     Alar ligament test     Other:     UE Nerve Mobility Left Right   Median nerve     Ulnar nerve     Radial nerve     Thoracic outlet Left Right   Adri     Adson's     Cervical rotation lateral flexion     Other:     Other:       Lumbar Special Tests Left Right   Quadrant test     Straight leg raise - -   Crossover response - -   Slump - -   Sit-up test     Trunk extensor endurance test    Prone instability test    Pablo's test    Repeated flexion    Repeated extension    Other:    SI Tests Left Right   SI Compression     SI Distraction     POSH (Thigh Thrust)     Sacral Thrust     FADIR     CANDIS     Landen's Test     Resisted Abduction     Pubic shotgun     Other:       PALPATION: increased tension and mild tenderness B cervical paraspinals, scalenes,   SPINAL SEGMENTAL CONCLUSIONS: cervical hypomobility noted      Assessment & Plan   CLINICAL IMPRESSIONS  Medical Diagnosis: Neck pain  Leg cramp  Upper back pain    Treatment Diagnosis: Neck pain  Leg cramp  Upper back pain   Impression/Assessment: Patient is a 64 year old female with neck and upper back pain  and intermittent right LE cramping complaints.  The following significant findings have been identified: Pain, Decreased ROM/flexibility, Decreased joint mobility, Decreased strength, Impaired muscle performance, Decreased activity tolerance, and Impaired posture. These impairments interfere with their ability to perform self care tasks, recreational activities, household chores, driving , household mobility, and community mobility as compared to previous level of function.     Clinical Decision Making (Complexity):  Clinical Presentation: Stable/Uncomplicated  Clinical Presentation Rationale: based on medical and personal factors listed in PT evaluation  Clinical Decision Making (Complexity): Low complexity    PLAN OF CARE  Treatment Interventions:  Interventions: Manual Therapy, Neuromuscular Re-education, Therapeutic Activity, Therapeutic Exercise, Self-Care/Home Management    Long Term Goals     PT Goal 1  Goal Identifier: self management  Goal Description: Patient will be independent with HEP and self management of symptoms to facilitate return to PLOF  Rationale: to maximize safety and independence with performance of ADLs and functional tasks  Goal Progress: initial  Target Date: 06/01/25  PT Goal  2  Goal Identifier: sleep  Goal Description: Patient will sleep without interruption from neck pain  Rationale: to maximize safety and independence with performance of ADLs and functional tasks  Goal Progress: initial  Target Date: 06/01/25  PT Goal 3  Goal Identifier: sleep  Goal Description: Patient will sleep without interruption from leg cramps for one week.  Rationale: to maximize safety and independence with performance of ADLs and functional tasks  Goal Progress: initial  Target Date: 06/01/25  PT Goal 4  Goal Identifier: driving  Goal Description: Patient will turn head while driving to check blindspots without pain or difficulty.  Rationale: to maximize safety and independence with performance of ADLs and functional tasks  Goal Progress: initial  Target Date: 06/01/25  PT Goal 5  Goal Identifier: NDI  Goal Description: Patient will demonstrate improved neck function and decreased pain by scoring 12% or less on the NDI  Rationale: to maximize safety and independence with performance of ADLs and functional tasks  Goal Progress: initial--24%  Target Date: 06/01/25      Frequency of Treatment: 1x/week  Duration of Treatment: 60 days    Recommended Referrals to Other Professionals: not at this time  Education Assessment:   Learner/Method: Patient;No Barriers to Learning    Risks and benefits of evaluation/treatment have been explained.   Patient/Family/caregiver agrees with Plan of Care.     Evaluation Time:     PT Eval, Low Complexity Minutes (30284): 22       Signing Clinician: Rafal Chaparro PT

## 2025-04-30 ENCOUNTER — APPOINTMENT (OUTPATIENT)
Dept: LAB | Facility: CLINIC | Age: 65
End: 2025-04-30
Payer: COMMERCIAL

## 2025-04-30 PROCEDURE — 82274 ASSAY TEST FOR BLOOD FECAL: CPT | Performed by: FAMILY MEDICINE

## 2025-05-06 ENCOUNTER — ANCILLARY PROCEDURE (OUTPATIENT)
Dept: CARDIOLOGY | Facility: CLINIC | Age: 65
End: 2025-05-06
Attending: FAMILY MEDICINE
Payer: COMMERCIAL

## 2025-05-06 DIAGNOSIS — I05.9 MITRAL VALVE DISEASE: ICD-10-CM

## 2025-05-06 LAB — LVEF ECHO: NORMAL

## 2025-05-06 PROCEDURE — 93306 TTE W/DOPPLER COMPLETE: CPT | Performed by: INTERNAL MEDICINE

## 2025-05-07 ENCOUNTER — RESULTS FOLLOW-UP (OUTPATIENT)
Dept: FAMILY MEDICINE | Facility: CLINIC | Age: 65
End: 2025-05-07

## (undated) DEVICE — MMIS - GLOVE SURG 7 PROTEXIS LF BLUE PF SMTH BEAD CUFF

## (undated) DEVICE — MMIS - GLOVE SURG 8 PROTEXIS LF BLUE PF SMTH BEAD CUFF

## (undated) DEVICE — MMIS - TIP SCT ARG YNKR 22FR 25CM VINYL SMTH EYE RGD OPEN

## (undated) DEVICE — MMIS - ELECTRODE ESURG BLADE PNCL 10FT BTN SWH HLSTR CORD

## (undated) DEVICE — MMIS - GLOVE SURG 7 PROTEXIS LF CRM PF BEAD CUFF STRL

## (undated) DEVICE — MMIS - ELECTRODE PT RTN C30- LB 9FT CORD NONIRRITATE

## (undated) DEVICE — MMIS - BANDAGE CMPR HOOK-LOCK 5YDX6IN VELCRO POLY ST

## (undated) DEVICE — MMIS - GLOVE SURG 8 PROTEXIS LF CRM PF BEAD CUFF STRL

## (undated) DEVICE — MMIS - PADDING CAST 4YDX6IN CTN WEBRIL 2 CRIMP FNSH STRL

## (undated) DEVICE — MMIS - BLANKET WRM LWR BODY 55.9X40.2IN MISTRAL-AIR FORCE

## (undated) DEVICE — MMIS - SOLUTION AQLT 0.9% NACL 1000ML LF IRR

## (undated) DEVICE — MMIS - KIT SURG HAND HAZ

## (undated) DEVICE — MMIS - DRESSING WND TELFA 8X3IN ABS PAD NADH STRL LF

## (undated) DEVICE — MMIS - PEN SURGMRK STD TIP FLXB RULER LBL PREP RST

## (undated) DEVICE — MMIS - SHEET PATRAN TRNSF

## (undated) DEVICE — MMIS - DRESSING HDRCLD 9X15CM AQCL AG STRL LF DISP